# Patient Record
Sex: FEMALE | Race: ASIAN | NOT HISPANIC OR LATINO | Employment: UNEMPLOYED | URBAN - METROPOLITAN AREA
[De-identification: names, ages, dates, MRNs, and addresses within clinical notes are randomized per-mention and may not be internally consistent; named-entity substitution may affect disease eponyms.]

---
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
## 2020-01-27 ENCOUNTER — PRENATAL OFFICE VISIT - HEALTHEAST (OUTPATIENT)
Dept: FAMILY MEDICINE | Facility: CLINIC | Age: 20
End: 2020-01-27

## 2020-01-27 ENCOUNTER — COMMUNICATION - HEALTHEAST (OUTPATIENT)
Dept: FAMILY MEDICINE | Facility: CLINIC | Age: 20
End: 2020-01-27

## 2020-01-27 DIAGNOSIS — Z34.92 SUPERVISION OF NORMAL PREGNANCY IN SECOND TRIMESTER: ICD-10-CM

## 2020-01-27 LAB
ALBUMIN UR-MCNC: NEGATIVE MG/DL
FASTING STATUS PATIENT QL REPORTED: NO
GLUCOSE 1H P 50 G GLC PO SERPL-MCNC: 105 MG/DL (ref 70–139)
GLUCOSE UR STRIP-MCNC: NEGATIVE MG/DL
HGB BLD-MCNC: 7.9 G/DL (ref 12–16)
KETONES UR STRIP-MCNC: NEGATIVE MG/DL

## 2020-01-28 LAB
C TRACH DNA SPEC QL PROBE+SIG AMP: NEGATIVE
N GONORRHOEA DNA SPEC QL NAA+PROBE: NEGATIVE
T PALLIDUM AB SER QL: NEGATIVE

## 2020-01-29 ENCOUNTER — COMMUNICATION - HEALTHEAST (OUTPATIENT)
Dept: FAMILY MEDICINE | Facility: CLINIC | Age: 20
End: 2020-01-29

## 2020-03-31 ENCOUNTER — PRENATAL OFFICE VISIT - HEALTHEAST (OUTPATIENT)
Dept: FAMILY MEDICINE | Facility: CLINIC | Age: 20
End: 2020-03-31

## 2020-03-31 DIAGNOSIS — B18.1 CHRONIC VIRAL HEPATITIS B WITHOUT DELTA AGENT AND WITHOUT COMA (H): ICD-10-CM

## 2020-03-31 DIAGNOSIS — O99.019 ANTEPARTUM ANEMIA: ICD-10-CM

## 2020-03-31 DIAGNOSIS — Z34.93 ENCOUNTER FOR SUPERVISION OF NORMAL PREGNANCY IN THIRD TRIMESTER: ICD-10-CM

## 2020-03-31 LAB
ALBUMIN UR-MCNC: NEGATIVE MG/DL
ALT SERPL W P-5'-P-CCNC: 9 U/L (ref 0–45)
ERYTHROCYTE [DISTWIDTH] IN BLOOD BY AUTOMATED COUNT: 16.2 % (ref 11–14.5)
FERRITIN SERPL-MCNC: <2 NG/ML (ref 10–130)
GLUCOSE UR STRIP-MCNC: NEGATIVE MG/DL
HCT VFR BLD AUTO: 24.4 % (ref 35–47)
HGB BLD-MCNC: 7.6 G/DL (ref 12–16)
KETONES UR STRIP-MCNC: NEGATIVE MG/DL
MCH RBC QN AUTO: 19.3 PG (ref 27–34)
MCHC RBC AUTO-ENTMCNC: 31 G/DL (ref 32–36)
MCV RBC AUTO: 62 FL (ref 80–100)
PLATELET # BLD AUTO: 248 THOU/UL (ref 140–440)
PMV BLD AUTO: 9.3 FL (ref 7–10)
RBC # BLD AUTO: 3.91 MILL/UL (ref 3.8–5.4)
WBC: 6.3 THOU/UL (ref 4–11)

## 2020-04-01 LAB
ALLERGIC TO PENICILLIN: NO
GP B STREP DNA SPEC QL NAA+PROBE: NEGATIVE

## 2020-04-03 ENCOUNTER — INFUSION - HEALTHEAST (OUTPATIENT)
Dept: INFUSION THERAPY | Facility: HOSPITAL | Age: 20
End: 2020-04-03

## 2020-04-03 DIAGNOSIS — O99.019 ANTEPARTUM ANEMIA: ICD-10-CM

## 2020-04-04 LAB
HBV DNA SERPL NAA+PROBE-ACNC: NORMAL [IU]/ML
HBV DNA SERPL NAA+PROBE-LOG IU: NORMAL {LOG_IU}/ML

## 2020-04-07 ENCOUNTER — PRENATAL OFFICE VISIT - HEALTHEAST (OUTPATIENT)
Dept: FAMILY MEDICINE | Facility: CLINIC | Age: 20
End: 2020-04-07

## 2020-04-07 ENCOUNTER — INFUSION - HEALTHEAST (OUTPATIENT)
Dept: INFUSION THERAPY | Facility: HOSPITAL | Age: 20
End: 2020-04-07

## 2020-04-07 DIAGNOSIS — Z34.93 ENCOUNTER FOR SUPERVISION OF NORMAL PREGNANCY IN THIRD TRIMESTER: ICD-10-CM

## 2020-04-07 DIAGNOSIS — O99.019 ANTEPARTUM ANEMIA: ICD-10-CM

## 2020-04-07 LAB
ALBUMIN UR-MCNC: NEGATIVE MG/DL
CRYSTALS AMN MICRO: NORMAL
GLUCOSE UR STRIP-MCNC: NEGATIVE MG/DL
KETONES UR STRIP-MCNC: NEGATIVE MG/DL

## 2020-04-07 ASSESSMENT — MIFFLIN-ST. JEOR: SCORE: 1246.7

## 2020-04-09 ENCOUNTER — INFUSION - HEALTHEAST (OUTPATIENT)
Dept: INFUSION THERAPY | Facility: HOSPITAL | Age: 20
End: 2020-04-09

## 2020-04-09 DIAGNOSIS — O99.019 ANTEPARTUM ANEMIA: ICD-10-CM

## 2020-04-13 ENCOUNTER — INFUSION - HEALTHEAST (OUTPATIENT)
Dept: INFUSION THERAPY | Facility: HOSPITAL | Age: 20
End: 2020-04-13

## 2020-04-13 DIAGNOSIS — O99.019 ANTEPARTUM ANEMIA: ICD-10-CM

## 2020-04-14 ENCOUNTER — PRENATAL OFFICE VISIT - HEALTHEAST (OUTPATIENT)
Dept: FAMILY MEDICINE | Facility: CLINIC | Age: 20
End: 2020-04-14

## 2020-04-14 DIAGNOSIS — Z34.80 SUPERVISION OF OTHER NORMAL PREGNANCY, ANTEPARTUM: ICD-10-CM

## 2020-04-14 DIAGNOSIS — B18.1 CHRONIC VIRAL HEPATITIS B WITHOUT DELTA AGENT AND WITHOUT COMA (H): ICD-10-CM

## 2020-04-14 DIAGNOSIS — Z34.93 THIRD TRIMESTER PREGNANCY: ICD-10-CM

## 2020-04-14 DIAGNOSIS — O99.019 ANTEPARTUM ANEMIA: ICD-10-CM

## 2020-04-14 LAB
ALBUMIN UR-MCNC: NEGATIVE MG/DL
ERYTHROCYTE [DISTWIDTH] IN BLOOD BY AUTOMATED COUNT: 28.2 % (ref 11–14.5)
GLUCOSE UR STRIP-MCNC: NEGATIVE MG/DL
HCT VFR BLD AUTO: 30.8 % (ref 35–47)
HGB BLD-MCNC: 8.8 G/DL (ref 12–16)
KETONES UR STRIP-MCNC: NEGATIVE MG/DL
MCH RBC QN AUTO: 22 PG (ref 27–34)
MCHC RBC AUTO-ENTMCNC: 28.6 G/DL (ref 32–36)
MCV RBC AUTO: 77 FL (ref 80–100)
PLATELET # BLD AUTO: 221 THOU/UL (ref 140–440)
PMV BLD AUTO: 11.3 FL (ref 8.5–12.5)
RBC # BLD AUTO: 4 MILL/UL (ref 3.8–5.4)
WBC: 7.8 THOU/UL (ref 4–11)

## 2020-05-19 ENCOUNTER — OFFICE VISIT - HEALTHEAST (OUTPATIENT)
Dept: FAMILY MEDICINE | Facility: CLINIC | Age: 20
End: 2020-05-19

## 2020-05-19 DIAGNOSIS — Z30.017 NEXPLANON INSERTION: ICD-10-CM

## 2020-05-19 LAB — HCG UR QL: NEGATIVE

## 2020-05-19 ASSESSMENT — EDINBURGH POSTNATAL DEPRESSION SCALE (EPDS): TOTAL SCORE: 0

## 2020-09-01 ENCOUNTER — RECORDS - HEALTHEAST (OUTPATIENT)
Dept: ADMINISTRATIVE | Facility: OTHER | Age: 20
End: 2020-09-01

## 2021-03-26 ENCOUNTER — AMBULATORY - HEALTHEAST (OUTPATIENT)
Dept: PHARMACY | Facility: HOSPITAL | Age: 21
End: 2021-03-26

## 2021-05-26 VITALS
HEART RATE: 98 BPM | OXYGEN SATURATION: 94 % | TEMPERATURE: 98.4 F | SYSTOLIC BLOOD PRESSURE: 87 MMHG | RESPIRATION RATE: 16 BRPM | DIASTOLIC BLOOD PRESSURE: 58 MMHG

## 2021-05-27 VITALS
SYSTOLIC BLOOD PRESSURE: 92 MMHG | DIASTOLIC BLOOD PRESSURE: 54 MMHG | HEART RATE: 98 BPM | TEMPERATURE: 98.4 F | OXYGEN SATURATION: 96 %

## 2021-05-27 VITALS
HEART RATE: 105 BPM | DIASTOLIC BLOOD PRESSURE: 60 MMHG | RESPIRATION RATE: 16 BRPM | SYSTOLIC BLOOD PRESSURE: 85 MMHG | TEMPERATURE: 98.5 F | OXYGEN SATURATION: 98 %

## 2021-05-27 NOTE — PATIENT INSTRUCTIONS - HE
Patient Education   HEALTHY PREGNANCY CARE: 37 to 41 WEEKS PREGNANT    Talk with your midwife or physician about when to call with signs of labor    Regular uterine contractions that are getting closer together and/or stronger    If you think your water has broken or is leaking    Bleeding from the vagina like a period (bloody vaginal discharge is normal)    If you are not feeling your baby move    Make plans for transportation and  as needed for when you are going to the hospital.    Your midwife or physician may offer to check your cervix for changes.     Ask your health care provider about vaccinations you may need following delivery. By now, you should have received a Tdap immunization to protect against pertussis or whooping cough. Fathers and family members who will be in close contact with the baby should also receive a Tdap shot at least two weeks before the expected birth of the baby if they have not had a Td (tetanus) shot for at least two years.    If you are past your due date, discuss the next steps leading to delivery with your midwife or physician. If you don't start labor on your own by 41 or 42 weeks, your midwife or physician may recommend giving you medicines to ripen your cervix and start labor.    Preparing for your baby: Tell your midwife or physician how you plan to feed your baby (breast or bottle), who you have chosen to do pediatric care for your baby, and if you have a boy, whether you have chosen to have him circumcised. You will need a car seat correctly installed in your vehicle to bring your baby home. As you start to set up the nursery at home for your baby, make sure the crib is safe. The mattress needs to fit snugly against the edges of the crib. If you can fit a soda can between the bars, they are too far apart and can allow the baby's head to caught between them.    Learn about infant care and feeding, including information about infant CPR. We recommend that you put  your baby to sleep on his or her back to reduce the chance of Sudden Infant Death Syndrome (SIDS). To maintain a healthy environment in which your child can grow, it's best to keep your home smoke-free. By preparing ahead, your transition into parenthood will go smoothly for you and your baby.    Your midwife or physician will want to see you for a checkup 2 to 6 weeks after delivery.    If you have questions about any symptoms you are experiencing or any other concerns, call your provider or their clinic staff at [facility] FAMILY MEDICINE/OB at Phone: [phone]. If it's after clinic hours, physician patients should call the [facility] Connection at [phone]); midwife patients should call their answering service at [phone].    How can you care for yourself at home?   You can refer to the Starting Out Right book or find it online at [email or web address]/[email or web address]/[email or web address]-Right.pdf or [email or web address]/images/stories/flipbooks/[email or web address]-starting-out-[email or web address]-starting-out-right.html#p=8     You can sign up for a weekly parenting e-mail that gives support, tips and advice from health care professionals that starts with pregnancy and continues through the toddler years. To register, go to [email or web address] at any time during your pregnancy.        Making Plans for Feeding My Baby    By this point, you probably have read a lot about feeding your baby.  Breastfeed or formula? Each mother s decision is her own and [facility] respects you and your choices. We ve gathered information on both breastfeeding and formula feeding to help with your decision. Talking with your physician or nurse-midwife can also help in your decision.  However you plan to feed your baby, [facility] encourage rooming in with your baby, skin-to-skin contact and feeding your baby based on his or her cues.    Skin-to-skin contact  Being close to mom  helps your baby adjust to life outside of the womb.  It helps your baby regulate their body temperature, heart rate, and breathing.  Your baby will usually be placed skin-to-skin immediately following birth or as soon as possible, if medical intervention is needed.    Rooming-In  Having your baby stay with you in your room is called  rooming-in .  Keeping your baby in your room helps you to learn how to care for your baby by getting to know your baby s cues, body rhythms and sleep cycle.       Cue-based feeding  Cues (signals) are baby s way of telling you what he or she wants.  When you learn your infant s cues, you know how to care for and feed your baby.   Feeding cues are the licking and smacking of lips, bringing their fist to their mouth, and a reflex called  rooting - where baby turns and opens his or her mouth, searching for the breast or bottle.  Crying is a late feeding cue.  Babies can feed frequently, often at least 8 times in 24 hours.  Breastfeeding facts  Breast milk is the best source of nutrition for your baby and is available at birth. In the first couple of days, your milk volume is already starting to increase, though it may not be noticeable. Breastfeed frequently to increase your milk supply. Within three to five days, you will begin to notice larger milk volumes. An increase in breast size, heaviness and firmness are often described as the milk  coming in.  Frequent breastfeeding can help breasts from getting overly firm and painful. You will know the baby is getting enough milk if your baby is having wet and dirty diapers and gaining weight.     If your goal is to exclusively breastfeed, it is important to not use any formula or artificial nipples (including bottles and pacifiers) while your baby is learning to breastfeed.  While it may seem like an  easy  option to give your baby a bottle, formula should only be given if there is a medical reason for your baby to have it.    Positioning and  attachment   Get comfortable.  Use pillows as needed to support your arms and baby.  Hold baby close at the level of your breast, facing you in a tummy to tummy position.  Skin to skin helps with this.  Position the baby with his or her nose by the nipple.  There should be a straight line from baby s ear to shoulder to hips.  Tickle your baby s lips or wait for baby to open mouth wide, bring baby to breast by leading with the chin.  Aim the nipple at the roof of baby s mouth.  A rapid sucking pattern is followed by longer, drawing pattern with occasional swallows heard.  When baby is correctly latched, your nipple and much of the areola are pulled well into baby s mouth.      Returning to work or school  Focus on a good start to breastfeeding.  Many women continue to provide breastmilk for their baby when they return to work or school.  Making plans about where to pump and store milk can make the transition go well.  Talk with other mothers who have also returned to work or school for tips and support.  Your employer s Human Resource department may be a resource as well.     Returning to work or school: (continued)     babies can mean fewer  sick  days for you.    A quality breast pump will also save time and add comfort.  Check with your insurance prior to giving birth for breast pump coverage.  Many insurance companies include a pump within your benefits.    Wait until your baby is at least three weeks old to introduce a bottle for the first time.  Have someone besides you give the bottle.    Breastfeed when you are with your baby. Reserve your bottles of breast milk for when you are away.     Your breasts will need to be  emptied  either by your baby or a pump.  Plan to pump at least twice in an eight hour day.    If you cannot pump at work, continue breastfeeding at home. Any amount of breast milk is worth giving to your baby.    Formula feeding facts  If you are planning to use formula to feed your  baby, you will want to make some preparations ahead of time. Talk to your doctor or nurse-midwife about what type of formula to use. Some are iron-fortified, meaning they have extra iron in them. You will want to purchase formula and bottles before your baby is born to be sure you are ready after you return from the hospital. [facility] do not provide formula samples to take home.    Be sure to follow formula mixing directions closely. Regular milk in the dairy case at the grocery store should not be given to babies under 1 year old. Baby formula is sold in several forms including:    Ready-to-use. This is the most expensive, but no mixing is necessary.    Concentrated liquid. This is less expensive than ready-to-use and you mix with water.    Powder. This is the least expensive. You mix one level scoop of powdered formula with two ounces of water and stir well.    Most babies need 2.5 ounces of formula per pound of body weight each day. This means an 8-pound baby may drink about 20 ounces of formula a day; however, this is just an estimate. The most important thing is to pay attention to your baby s cues.  If your baby is always fussy, needs more iron or has certain food allergies, your physician may suggest you change your baby s formula to a different kind.   How do I warm my baby s bottles?  You may feed your baby a bottle without warming it first. It is OK for the breast milk or formula to be cool or room temperature. If your baby seems to prefer it warmed, you can put the filled bottle in a container of warm water and let it stand for a few minutes. Check the temperature of the liquid on your skin before feeding it to your baby; to be sure it isn t too hot. Do not heat bottles in the microwave. Microwaves heat food and liquids unevenly, and this can cause hot spots that can burn your baby.    How do I clean and sterilize bottles?  Sterilize bottles and nipples before you use them for the first  time. You can do this by putting them in boiling water for 5 minutes. After that first time, you can wash them in hot and soapy water. Rinse them carefully to be sure there is no soap left on them. You can also wash them in the .    [facility] [phone] ([phone])

## 2021-05-27 NOTE — PROGRESS NOTES
Discussed post dates management.  When to come to the hospital.  No ctx, lof, bleeding, or dc.  No HA or vision changes.

## 2021-05-30 VITALS — BODY MASS INDEX: 20.84 KG/M2 | WEIGHT: 113 LBS

## 2021-05-31 VITALS — BODY MASS INDEX: 24.15 KG/M2 | WEIGHT: 131 LBS

## 2021-05-31 VITALS — WEIGHT: 129 LBS | BODY MASS INDEX: 23.79 KG/M2

## 2021-05-31 VITALS — BODY MASS INDEX: 23.6 KG/M2 | WEIGHT: 128 LBS

## 2021-05-31 VITALS — BODY MASS INDEX: 23.79 KG/M2 | WEIGHT: 129 LBS

## 2021-06-01 VITALS — WEIGHT: 102 LBS | BODY MASS INDEX: 19.26 KG/M2 | HEIGHT: 61 IN

## 2021-06-01 NOTE — TELEPHONE ENCOUNTER
New Appointment Needed  What is the reason for the visit:    Pregnancy Confirmation Appt Needed  When was the first day of your last menstrual cycle?: 07-14 or 07-15-19  Have you done a home pregnancy test?: Yes: 2 weeks ago.    Provider Preference: PCP only  How soon do you need to be seen?: tomorrow  Waitlist offered?: No  Okay to leave a detailed message:  Yes

## 2021-06-01 NOTE — PATIENT INSTRUCTIONS - HE
Pregnancy: about 8 weeks    Due Date: April 20, 2020    Next visit in 4 weeks  With Dr. Linares  For Your First OB Visit

## 2021-06-01 NOTE — PROGRESS NOTES
Chief Complaint:  Chief Complaint   Patient presents with     pregnancy confirmation     HPI:   Ifeanyi Irizarry is a 19 y.o. female is here for pregnancy intake.  She is .  Patient's last menstrual period was 07/15/2019 (approximate).  Last delivery 19.  Had periods in May and .  Positive home pregnancy test 1 month ago.had some nausea and vomiting, getting better.    Past medical history: reviewed and updated.  Past Medical History:   Diagnosis Date     Anemia affecting pregnancy 2017     Chlamydia infection 2016     History of fetal demise 2016    missed AB 18 weeks gestation      (normal spontaneous vaginal delivery)      Postpartum hemorrhage 2019     Short cervix during pregnancy in third trimester 2017     Short femur of fetus on prenatal ultrasound 8/3/2017     History reviewed. No pertinent surgical history.    Current Outpatient Medications:      ascorbic acid, vitamin C, (ASCORBIC ACID WITH LILI HIPS) 500 MG tablet, Take 1 tablet (500 mg total) by mouth daily., Disp: 90 tablet, Rfl: 0     docusate sodium (COLACE) 100 MG capsule, Take 1 capsule (100 mg total) by mouth daily., Disp: 45 capsule, Rfl: 0     prenatal vit no.112-folate no6 1 mg Chew, Chew 1 tablet daily., Disp: 100 tablet, Rfl: 3    Social:  Social History     Tobacco Use     Smoking status: Never Smoker     Smokeless tobacco: Never Used     Tobacco comment: no second hand smoke   Substance Use Topics     Alcohol use: No     Drug use: No       OBJECTIVE:  No Known Allergies  Vitals:    19 1427   BP: 100/60   Pulse: 100   Resp: 16     Body mass index is 20.07 kg/m .    Vital signs stable as recorded above  General: Patient is alert and oriented x 3, in no apparent distress  Remainder of physical exam deferred to patient's First OB Visit.    Results:  Results for orders placed or performed in visit on 19   Culture, Urine   Result Value Ref Range    Culture No Growth    Pregnancy (Beta-hCG, Qual), Urine    Result Value Ref Range    Pregnancy Test, Urine Positive (!) Negative   Drugs of Abuse 1,Urine   Result Value Ref Range    Amphetamines Screen Negative Screen Negative    Benzodiazepines Screen Negative Screen Negative    Opiates Screen Negative Screen Negative    Phencyclidine Screen Negative Screen Negative    THC Screen Negative Screen Negative    Barbiturates Screen Negative Screen Negative    Cocaine Metabolite Screen Negative Screen Negative    Oxycodone Screen Negative Screen Negative    Creatinine, Urine 64.7 mg/dL   Urinalysis, OB Screen   Result Value Ref Range    Glucose, UA Negative Negative    Ketones, UA Negative Negative    Protein, UA Negative Negative mg/dL     Other screening pregnancy lab work is ordered and pending.    Patient scored a 4/30 on [place]  Depression Screen.    Assessment and Plan:  1. Pregnancy Intake Appointment.  Ifeanyi Irizarry is 19 y.o. and .  Patient's last menstrual period was 07/15/2019 (approximate).  Estimated Date of Delivery: 20  She will be seeing Dr. Linares for OB care.  Screening pregnancy lab work was drawn.  Prenatal vitamins prescribed.  Problem list and current medications reviewed and updated.  Dating ultrasound ordered.  Prenatal info packet given.  First trimester genetic screening test was discussed with patient.    She declines Nuchal translucency ultrasound.        2. Last delivery 19.    3. Hepatitis B.    Follow up in 4 weeks.  Please see OB Episode for complete details.  Visit was approximately 40 minutes, greater than 50% of time spent in face-to-face counseling and coordination of care.    This dictation uses voice recognition software, which may contain typographical errors.

## 2021-06-01 NOTE — TELEPHONE ENCOUNTER
Called pt and informed her that she will need to see Kimber first for a pregnancy confirmation. Per pt agreed and an appt was made.

## 2021-06-02 VITALS — WEIGHT: 122 LBS | BODY MASS INDEX: 22.86 KG/M2

## 2021-06-02 VITALS — WEIGHT: 126 LBS | BODY MASS INDEX: 23.61 KG/M2

## 2021-06-02 VITALS — WEIGHT: 120 LBS | BODY MASS INDEX: 22.49 KG/M2

## 2021-06-02 VITALS — BODY MASS INDEX: 20.61 KG/M2 | WEIGHT: 110 LBS

## 2021-06-02 VITALS — WEIGHT: 104 LBS | BODY MASS INDEX: 19.49 KG/M2

## 2021-06-02 VITALS — BODY MASS INDEX: 21.55 KG/M2 | WEIGHT: 115 LBS

## 2021-06-02 NOTE — TELEPHONE ENCOUNTER
Patient has a future appointment on Nov. 7, 2019 @ 3:30 with Dr. Linares for First OB. Completing task.

## 2021-06-02 NOTE — TELEPHONE ENCOUNTER
Who is calling:  Patient   Reason for Call:  Patient needs to schedule her OB appointment, but is not sure when she should come in.  States it should be with Dr. Linares, but his first opening is October 31st.    Clinic please contact patient to schedule appropriately  Date of last appointment with primary care: 9.9.2019  Okay to leave a detailed message: Yes

## 2021-06-03 VITALS
HEART RATE: 100 BPM | RESPIRATION RATE: 16 BRPM | WEIGHT: 106.2 LBS | HEIGHT: 61 IN | BODY MASS INDEX: 20.05 KG/M2 | SYSTOLIC BLOOD PRESSURE: 100 MMHG | DIASTOLIC BLOOD PRESSURE: 60 MMHG

## 2021-06-03 VITALS
WEIGHT: 105 LBS | SYSTOLIC BLOOD PRESSURE: 88 MMHG | HEART RATE: 84 BPM | DIASTOLIC BLOOD PRESSURE: 40 MMHG | BODY MASS INDEX: 19.84 KG/M2

## 2021-06-03 NOTE — TELEPHONE ENCOUNTER
Called and left detailed message for Elba regarding the information that she requested. Instructed her to return phone call if further information is needed.   Pia Kaplan

## 2021-06-03 NOTE — TELEPHONE ENCOUNTER
----- Message from Catrachito Linares MD sent at 11/10/2019  5:15 PM CST -----  Call:  Blood level is low.  It is important to take the iron supplement and eat foods that have iron.

## 2021-06-03 NOTE — TELEPHONE ENCOUNTER
----- Message from Catrachito Linares MD sent at 11/23/2019 10:47 AM CST -----  Call:  Ultrasound looks good.

## 2021-06-03 NOTE — TELEPHONE ENCOUNTER
Who is calling:  Elba Tamayo CoKacie [facility]   Health Hepatitis B Unit  Reason for Call:  Caller stated she is calling to request the following information regarding the patient.    1. Due Date  2. Name of hospital chosen for delivery  Date of last appointment with primary care:N/a  Okay to leave a detailed message: Yes  [phone]

## 2021-06-03 NOTE — PATIENT INSTRUCTIONS - HE
"  Patient Education   HEALTHY PREGNANCY CARE: 14 to 18 WEEKS PREGNANT    During this time, you may start to \"show,\" so that you look pregnant to people around you. You may also notice some changes in your skin, such as an increase in acne on your face. You may notice your heart pounding, a sharp stretching ache on either side of your lower abdomen (round ligament), and more vaginal discharge.     Your baby's nerves and muscles are maturing. Sex organs are recognizable. Your baby is now able to pass urine, and your baby's first stool (meconium) is starting to collect in his or her intestines. Hair is also beginning to grow on your baby's head. Your baby is moving freely inside your uterus but you may not be able to feel it until 18-20 weeks.    Continue making healthy choices for your baby during pregnancy, including good nutrition, exercise and a safe environment free from smoking, alcohol and drugs.    Your genetic screening with a quad screen blood test may have been done today.    Watch for warning signs and contact your midwife or physician at the clinic with any concerns at [facility] FAMILY MEDICINE/OB at Phone: [phone]. For example, call about cramping, bleeding, abdominal pain, watery vaginal discharge, or if you are unable to keep fluids down for more than 24 hours due to vomiting.   If it's after clinic hours, physician patients should call the Care Connection at [phone]); midwife patients should call their answering service at [phone].    How can you care for yourself at home?   You can refer to the Starting Out Right book or find it online at [email or web address]/stories/[email or web address]-[email or web address]-Right.pdf or http://www.[email or web address]/images/stories/flipbooks/[facility]-starting-out-right/[facility]-starting-out-right.html#p=8     You can sign up for a weekly parenting e-mail that gives support, tips and advice from health care professionals that starts " with pregnancy and continues through the toddler years. To register, go to [email or web address] at any time during your pregnancy.

## 2021-06-03 NOTE — PROGRESS NOTES
18w  Dates reviewed and ultrasound 10 days differing from LMP at 9 weeks, so will change due date to ultrasound.    Feeling ok.  Close pregnancies.  Wasn't intending on this soon, but will continue pregnancy.  Iron likely still low, so will recommend she start on FeSO4.    Discussed screening for aneuploidy and neural tube defects. She declined screening.    Carrier screening for SMA, CF, thalassemia, fragile X discussed.  She declined screening.    Reviewed intake exam, labs, DELROY, past medical history, past surgical history, family history, genetic history, and previous obstetrical history.     Just had testing for Hep B with low viral load so doesn't need recheck now.    1. Supervision of normal pregnancy in second trimester  - US OB > = 14 Weeks; Future  - ABO/RH Typing (OP order)  - Hepatitis B Surface antigen (HBsAG)  - HIV Antigen/Antibody Screening [facility]  - HM1(CBC and Differential)  - HML Antibody Screen  - RPR  - Rubella Immune Status (IgG)  - Urinalysis Macroscopic  - Culture, Urine  - Lead, Blood  - HM1 (CBC with Diff)  - ferrous sulfate 325 (65 FE) MG tablet; Take 1 tablet (325 mg total) by mouth daily.  Dispense: 60 tablet; Refill: 1  - Lead, Blood, Venouos    2. Anemia, unspecified type  - ferrous sulfate 325 (65 FE) MG tablet; Take 1 tablet (325 mg total) by mouth daily.  Dispense: 60 tablet; Refill: 1

## 2021-06-03 NOTE — TELEPHONE ENCOUNTER
"Called and left message for pt to return call.# 3  \" Okay to relay message\"  Okay to send letter?  "

## 2021-06-04 VITALS
RESPIRATION RATE: 18 BRPM | TEMPERATURE: 98.5 F | TEMPERATURE: 98.8 F | HEART RATE: 107 BPM | DIASTOLIC BLOOD PRESSURE: 58 MMHG | OXYGEN SATURATION: 97 % | HEART RATE: 92 BPM | BODY MASS INDEX: 22.66 KG/M2 | HEIGHT: 61 IN | SYSTOLIC BLOOD PRESSURE: 92 MMHG | WEIGHT: 120 LBS | WEIGHT: 122 LBS | DIASTOLIC BLOOD PRESSURE: 50 MMHG | BODY MASS INDEX: 23.05 KG/M2 | SYSTOLIC BLOOD PRESSURE: 90 MMHG | RESPIRATION RATE: 16 BRPM

## 2021-06-04 VITALS
SYSTOLIC BLOOD PRESSURE: 100 MMHG | RESPIRATION RATE: 14 BRPM | DIASTOLIC BLOOD PRESSURE: 65 MMHG | TEMPERATURE: 98.5 F | WEIGHT: 123 LBS | BODY MASS INDEX: 23.24 KG/M2 | HEART RATE: 93 BPM

## 2021-06-04 VITALS
HEART RATE: 67 BPM | WEIGHT: 109 LBS | SYSTOLIC BLOOD PRESSURE: 93 MMHG | BODY MASS INDEX: 20.6 KG/M2 | TEMPERATURE: 97.8 F | DIASTOLIC BLOOD PRESSURE: 55 MMHG | RESPIRATION RATE: 16 BRPM

## 2021-06-04 VITALS
BODY MASS INDEX: 20.78 KG/M2 | SYSTOLIC BLOOD PRESSURE: 90 MMHG | HEART RATE: 88 BPM | DIASTOLIC BLOOD PRESSURE: 50 MMHG | WEIGHT: 110 LBS

## 2021-06-04 VITALS
TEMPERATURE: 99 F | HEART RATE: 92 BPM | RESPIRATION RATE: 16 BRPM | SYSTOLIC BLOOD PRESSURE: 98 MMHG | WEIGHT: 119 LBS | BODY MASS INDEX: 22.48 KG/M2 | DIASTOLIC BLOOD PRESSURE: 56 MMHG

## 2021-06-04 NOTE — TELEPHONE ENCOUNTER
----- Message from Catrachito Linares MD sent at 12/28/2019  1:52 PM CST -----  Due for ob follow up

## 2021-06-04 NOTE — TELEPHONE ENCOUNTER
Left message #1 at [phone] for patient to call clinic to schedule OB appt with Dr. Linares. Postponing task out to a week and will try again.

## 2021-06-05 NOTE — PROGRESS NOTES
29w3d  Daughter had appt today, so we adde mom on as she has not been here for care, recently.  No ctx, lof, bleeding, or dc.  No HA or vision changes.     BP 90/50 (Patient Site: Left Arm, Patient Position: Sitting, Cuff Size: Adult Small)   Pulse 88   Wt 110 lb (49.9 kg)   LMP 07/15/2019 (Approximate)   BMI 20.78 kg/m       Recent Results (from the past 24 hour(s))   Urinalysis, OB Screen   Result Value Ref Range    Glucose, UA Negative Negative    Ketones, UA Negative Negative    Protein, UA Negative Negative mg/dL   Glucose,Gestational Challenge (1 Hour)   Result Value Ref Range    Glucose, Gestational Challenge 1hr 105 70 - 139 mg/dL    Patient Fasting > 8hrs? No    Hemoglobin   Result Value Ref Range    Hemoglobin 7.9 (LL) 12.0 - 16.0 g/dL      1. Supervision of normal pregnancy in second trimester  Discussed routine follow up for pregnancy care.  She is doing well, in spite of  - Urinalysis, OB Screen  - Glucose,Gestational Challenge (1 Hour)  - Syphilis Screen, [facility]  - Hemoglobin  - Tdap vaccine,  8yo or older,  IM  - Chlamydia trachomatis & Neisseria gonorrhoeae, Amplified Detection     2. Anemia  Was supposed to be on iron supplement.  Hgb low despite--if not taking, needs to start.  If taking, would consider this refractory and need to consider IV iron repletion.

## 2021-06-05 NOTE — TELEPHONE ENCOUNTER
----- Message from Catrachito Linares MD sent at 1/29/2020  4:59 PM CST -----  Call:  hgb is lower.  Have you been taking iron (ferrous sulfate) and vit c?  If not we would consider IV treatment to get your iron levels up.

## 2021-06-05 NOTE — TELEPHONE ENCOUNTER
"Called and left message for pt to return call.#3. \" Okay to relay message\"    Third attempt today. Okay to send a letter?    "

## 2021-06-05 NOTE — TELEPHONE ENCOUNTER
Informed pt of message. She states she has not been taking the iron or vitamin C. She declined IV treatment as well.

## 2021-06-05 NOTE — TELEPHONE ENCOUNTER
Blood level is low.  We worry about starting low and then having bleeding during delivery  I think it is important to take the iron (and Vit C helps more of this absorb into the body)

## 2021-06-05 NOTE — TELEPHONE ENCOUNTER
----- Message from Catrachito Linares MD sent at 1/27/2020  5:06 PM CST -----  Call:  Good news, normal glucose.

## 2021-06-07 NOTE — PROGRESS NOTES
"Patient arrived ambulatory and was seated in chair 2. Reviewed plan of care and today's treatment. \"Yes, I have a lot of activity from the baby - I see the doctor tomorrow.\" Placed IV and used NS as the primary IV solution. Administered the 3rd dose of iron sucrose 200 mg over 5 minutes IVP, and flushed the IV line - using  mls. Patient was monitored, refer to the vital signs tab for those results. Up to the bathroom x 1. Drank 120 mls of apple juice. Left the unit ambulatory in stable condition at 14:26 - patient plans to return on Wednesday.    Taisha Martin RN  "

## 2021-06-07 NOTE — PROGRESS NOTES
Started having ctxs yesterday every 5 minutes but gone this morning.  Regular fetal movement.  No bleeding.  Maybe LOF yesterday- had a gush of fluid x2, clear.  Had to change pad and underware and no LOF since then.  No PROM with other kids.  Did get iron infusion this week and again today.      Fern negative today- lots of thick mucous noted.      F/u with Dr Matias next week

## 2021-06-07 NOTE — PROGRESS NOTES
Reviewed low iron and recommendations for iron infusions x5 sessions before baby born due to low blood supply from covid-19.  Has been eating iron daily and PNV.  Not feeling tired.  At home with kids-  not working right now.  Regular fetal movement.  Mild ctxs a few days ago but went away.  nobleeding or LOF.  Has needed supplies for baby.  No concerns and otherwise feeling healthy.     Set up first iron infusion today before leaving.  GBS today.

## 2021-06-07 NOTE — PROGRESS NOTES
Discussed change in management and care by providers due to covid 19.   Discussed change in hospital policies and procedures due to covid 19.   Feeling well but tired of pregnancy.   No contractions, lof, bleeding. +FM.   She would like to be induced today.   Called [facility]. No space today but may come tomorrow. Scheduled for induction of labor tomorrow though bishops is 2.   Patient notified she may have to wait if availability at hospital is not better. She will call in the am at 6:30.   Of note recently treated for worsening anemia in light of covid 19.   Five infusions ordered, completed four. Patient says the RN canceled her fifth due to low blood pressure?  Hemoglobin checked today, is improved. Should be on oral iron as well.

## 2021-06-07 NOTE — PROGRESS NOTES
Ifeanyi Irizarry arrived for dose two Iron Sucrose IV push. Ifeanyi Irizarry was educated on her plan of care. Each medication given today was reviewed prior to administration. Iron Sucrose 200 mg was given IV push with NS infusing. Treatment and 30 minute post infusion observation period were completed with no signs of reaction. IV site:peripheral IV patent throughout treatment with positive blood return obtained before and at completion. IV site with no pain, redness, swelling and drainage. IV site flushed with saline and discontinued. Ifeanyi Irizarry has follow-up appointment scheduled on 04/09/2020. Ifeanyi Irizarry was discharged to home. She discharged from unit ambulatory and independent at 1622 . The after visit summary was declined.     Laura Gray

## 2021-06-07 NOTE — PROGRESS NOTES
Pt came into infusion clinic for her IV iron as ordered. Medications explained to pt who verbalized understanding. IV patent throughout infusion. Pt tolerated infusion with no complications. Pt monitored for 30 min with no s/s of a reaction. Pt left infusion clinic via ambulatory and will RTC as sched.

## 2021-06-07 NOTE — PATIENT INSTRUCTIONS - HE
Patient Education   HEALTHY PREGNANCY CARE: 37 to 41 WEEKS PREGNANT    Talk with your midwife or physician about when to call with signs of labor    Regular uterine contractions that are getting closer together and/or stronger    If you think your water has broken or is leaking    Bleeding from the vagina like a period (bloody vaginal discharge is normal)    If you are not feeling your baby move    Make plans for transportation and  as needed for when you are going to the hospital.    Your midwife or physician may offer to check your cervix for changes.     Ask your health care provider about vaccinations you may need following delivery. By now, you should have received a Tdap immunization to protect against pertussis or whooping cough. Fathers and family members who will be in close contact with the baby should also receive a Tdap shot at least two weeks before the expected birth of the baby if they have not had a Td (tetanus) shot for at least two years.    If you are past your due date, discuss the next steps leading to delivery with your midwife or physician. If you don't start labor on your own by 41 or 42 weeks, your midwife or physician may recommend giving you medicines to ripen your cervix and start labor.    Preparing for your baby: Tell your midwife or physician how you plan to feed your baby (breast or bottle), who you have chosen to do pediatric care for your baby, and if you have a boy, whether you have chosen to have him circumcised. You will need a car seat correctly installed in your vehicle to bring your baby home. As you start to set up the nursery at home for your baby, make sure the crib is safe. The mattress needs to fit snugly against the edges of the crib. If you can fit a soda can between the bars, they are too far apart and can allow the baby's head to caught between them.    Learn about infant care and feeding, including information about infant CPR. We recommend that you put  your baby to sleep on his or her back to reduce the chance of Sudden Infant Death Syndrome (SIDS). To maintain a healthy environment in which your child can grow, it's best to keep your home smoke-free. By preparing ahead, your transition into parenthood will go smoothly for you and your baby.    Your midwife or physician will want to see you for a checkup 2 to 6 weeks after delivery.    If you have questions about any symptoms you are experiencing or any other concerns, call your provider or their clinic staff at [facility]OB at Phone: [phone]. If it's after clinic hours, physician patients should call the [facility] Connection at [phone] ([phone]); midwife patients should call their answering service at [phone].    How can you care for yourself at home?   You can refer to the Starting Out Right book or find it online at [email or web address]-[email or web address] or [email or web address]/images/stories/[email or web address]-out-[email or web address]-starting-out-[email or web address]#p=[email or web address]     You can sign up for a weekly parenting e-mail that gives support, tips and advice from health care professionals that starts with pregnancy and continues through the toddler years. To register, go to [email or web address] at any time during your pregnancy.    Making Early Breastfeeding or Chestfeeding Work: What's Important?  Breastfeeding/chestfeeding is important!     It helps keep babies healthy.    Parents who breastfeed/chestfeed have lower risks of breast and ovarian cancer.    It's convenient: the milk is always ready and warm, and there is nothing to mix or prepare for feeding.    Formula is harder for your baby to digest.    It helps you bond with your baby and protects against postpartum depression.  Lots of early skin-to-skin contact with your baby    Place your naked baby with baby's belly against your bare chest. Cover baby's back with a  "blanket    Start skin-to-skin right after birth, as soon as you are ready    Skin-to-skin:  ? Helps keep baby warm  ? Improves baby's oxygen and blood sugar levels  ? Helps your uterus contract and bleed less  ? Helps baby feel calm and comforted  ? Helps you feel close to baby  ? Helps get breastfeeding started. Being close makes latching on easier, and baby may move over to the nipple and latch without help  ? Baby breastfeeds better and longer when skin-to-skin  Placing baby well for good attachment to the breast or chest    Hold your baby close with baby's tummy touching your tummy.    Wait for baby to open mouth wide, then bring baby onto the breast/chest.    Baby should take a big mouthful of breast/chest, not just the nipple. This helps baby get more milk, and the suckling should feel comfortable.    When baby is latched well to the breast/chest, nipples aren't cracked or painful.  Keeping baby near (called \"rooming-in\" at the hospital)    Baby sleeps better and cries less when birth parent is near. Your room is quiet.    We will place your baby safely on their back in their bassinette. This lets you practice safe sleep for your baby while keeping them at your bedside.    Baby feeds more often, which means your milk supply increases faster, and your baby loses less weight.    Parents have an easier time getting to know and bonding with baby.    Parents feel much more confident about baby care and breastfeeding/chestfeeding.    Maternity staff can help at any time.  Feeding on cue    Feeding on cue simply means feeding whenever your baby shows signs of hunger    Crying is a late hunger sign. Feed baby whenever baby wants for as long as baby wants.    Feeding signs: mouth movements, sticking the tongue out, rooting (baby turns toward chest and may open mouth), hand-to-mouth movements    Advantages of feeding on cue:  ? Frequent breastfeeding/chestfeeding in the first weeks after birth gives you a good milk " "supply for months to come.  ? Babies settle into a relaxing feed more quickly. Babies enjoy feedings more when they don't have to cry to be fed.  ? Feeding is comfort as well as nutrition. Newborns love constant closeness and feeding and can't be held \"too much\" or \"spoiled.\"  ? Newborns need small frequent feedings in the first days of life. Just one to three teaspoons fill a new baby's stomach.  ? Responding to feeding cues helps babies gain weight.  Feeding only human milk in the first six months    Colostrum is the first milk that baby gets at birth. The amount of colostrum matches the baby's stomach, so it will not be overfilled.    The small volumes ready at birth are also easier for baby to handle.    All babies lose weight in the first few days. This is simply \"water weight.\"    Introducing food or fluids other than human milk too early can cause problems for breastfeeding/chestfeeding and for your baby's health.    Feeding only human milk maximizes the protection against disease and infections.    Your body knows how much milk to make by how often your baby feeds. If you give your baby formula, your body may not know how much milk to make.    For informational purposes only. Not to replace the advice of your health care provider. Adapted with permission from \"Getting Started with Infant Care and Breastfeeding,\" by MARGARET Elizabeth, TELMA, Edel Krishnamurthy, RN, IBCLC, Karine Macedo MD, TELMA, and Blanca Walker MD, IBCLC. [place] [employer], 2017. Clinically reviewed by [facility]. [employer] 303701 - 05/19.        [facility] Breastfeeding Resources     Research shows that human milk offers the best  nutrition and protection for babies. So at [facility],  we care for families and babies in a way that  promotes, teaches and supports lactation. We  support all breastfeeding, chestfeeding and human  milk feeding families, as well as families who can t  breastfeed / chestfeed or who " choose not to do so.  A mother or caregiver s own milk is best for a baby,  but if you are unable to breastfeed / chestfeed, we  may suggest pasteurized donor human milk for your  baby while in the hospital.    Lactation support    The following [facility] locations offer  individual outpatient lactation consults. Some  locations offer phone or group lactation consults  with certified lactation consultants. Call to confirm  services and for information and appointments. You  may wish to call your insurance company first to see  if they will cover the cost of a consult.    [facility]: [phone]    [facility]: [phone]    [facility]: [phone]  Offers [facility] First Days program, which includes  group lactation visits and one free information session  about delivering your baby at a designated [facility] hospital and the importance and benefits  of breastfeeding and human milk. These group visits  also help patients with feeding concerns and offer  information about other postpartum topics.                For informational purposes only. Not to replace the advice of your health care provider. Copyright   2005 [facility]. All rights reserved. [employer] 922225 - REV          [facility] ([place]):  [phone]    [facility] ([place]):  [phone] or [phone]    [facility]):  [phone]    [facility]  ([place]): [phone]    [facility] ([place]):  [phone] or [phone]  Includes follow-up visits for caregivers of babies  discharged from the  intensive care unit  (NICU) at [facility].    [facility]):  [phone]    [facility] ([facility],  [facility], primary care clinics):  [phone]  Also offers  weight checks, feeding discussions and support with a lactation consultant as a part of free, weekly support group.    [facility]: [phone]  Also offers a free weekly support group.                                                                                                                                                                                                      (continued)   You may also call [facility] at [phone]  for information about [place] and [facility]  locations that offer lactation support. For information  about breastfeeding / chestfeeding and childbirth  classes in the [facility], go to [facility] at [email or web address]. For [facility], go to [email or web address] and click on  Classes and Events at the bottom of the page. Or, call  [phone].    Supplies    You can get breast pumps from the Birthplace nurses  at [facility] or [facility]  nurses at [facility]. Call your health  insurance to see if they will cover the cost of the  pump. Tell your nurse you d like a pump. They will  help you fill out the right paperwork. The pump will  be ready for you when you leave the hospital.    If you decide to get a pump after you leave the  hospital, you can get one from our partners at  [facility]. [facility] carries a range of  feeding supplies and pumps. Please call your health  insurance to see if they will cover the cost of the  pump. Then call [facility]  to find out what supplies and pumps are available.  Some stores may deliver the pump to your home.    [facility]:  [email or web address] Other lactation services    [employer]: [phone] (24 hours a day)  www.[email or web address].org  Offers support for breastfeeding / chestfeeding and  human milk feeding families. Call to find a group in  your area.    National Women s Health  Information Center:  [phone]  www.womenshealth.gov/breastfeeding  Offers a breastfeeding / chestfeeding information  line in English and Polish.    WIC (Women, Infants and Children) Program:  [phone]  Offers breastfeeding / chestfeeding counseling. Call  to find an office near you.    Milk banking    [facility]  [email or web address]  [phone]  Consider freezing your extra collected milk to donate  to babies in need. Email or call for information.                       If you are deaf or hard of hearing, please let us know. We provide many free services including  sign language interpreters, oral interpreters, TTYs, telephone amplifiers, note takers and written materials.

## 2021-06-07 NOTE — PROGRESS NOTES
"Patient  arrived at 13:04, seated in chair 4. Reviewed plan of care. Placed IV and administered the 3rd dose of iron sucrose 200 mg IVP over 5 minutes. Patient was monitored. \"I feel fine, no dizziness, no lightheadedness.\" Left the unit ambulatory at 14:15 in stable condition and plans to return next Monday. \"The baby is a girl, she is due tomorrow - my other two babies were 2 and 3 days late.\"     Taisha Martin RN  "

## 2021-06-08 NOTE — PROGRESS NOTES
Post Partum Check:    Delivery at 40w5d now .  Date of delivery: 4/15/20    Patient concerns: none    Bleeding: no concerns    Pain: no concerns    LMP:Patient's last menstrual period was 07/15/2019 (approximate).     Depression: no concerns  Postpartum Depression Screen EPDS Total Score: 0 (2020  4:00 PM)  .  Item 10 (suicidal thoughts): Negative.  Interpretation Guide: Possible Depression = 10 or greater.       Contraception Plan:  Nexplanon today    Immunizations needed:  none    Pap smear:  Too young    Physical Exam:  Blood pressure 93/55, pulse 67, temperature 97.8  F (36.6  C), temperature source Tympanic, resp. rate 16, weight 109 lb (49.4 kg), last menstrual period 07/15/2019, unknown if currently breastfeeding. Body mass index is 20.6 kg/m .  Heart: Regular rate and rhythm, no murmurs, rubs, or gallops.  Lungs: Clear to auscultation bilaterally.  No crackles.  Abdomen: Soft, nontender, bowel sounds normal.  No significant uterine tenderness.  Genitourinary: declined.  Extremities: Nontender, no significant edema.        Assessment and Plan     20 y.o.  here today for postpartum check.  1. Discussed ideal body weight.   2. Return to work: NA.  3. Pap smear too young.  4. Contraception Plan: Nexplanon. Please see other note  5. Postpartum Evaluation of Pregnancy/Chronic Health Conditions: none.  6. Referrals: none.  7. Next physical exam due in one year.

## 2021-06-08 NOTE — PROGRESS NOTES
CC:  Here for Nexplanon insertion today   HPI:  Pt is a  20 y.o.  alert female who presents for Nexplanon insertion today. Reviewed with patient all contraceptive options including:  OCP, Ortho Evra, Nuvaring, Depo Provera, IUD-both Mirena and ParaGard, nexplanon, condoms and diaphragm.  After hearing all of her options, the patient chose Nexplanon.  We discussed risks and benefits of nexplanon  including:   the possible side effect of metrorrhagia with irregular spotting or bleeding within the first 3-6 months after insertion as well as the 20% risk of amenorrhea.  Other minor side effects were discussed including:  headache (16 percent), weight gain (12 percent), acne (12 percent), breast tenderness (10 percent), emotional lability (6 percent) and abdominal pain (5 percent), pain at insertion site.  Informed consent was obtained.  Pregnancy test was negative.  Had sex a week ago but is about 4-5 weeks postpartum.       Patient's last menstrual period was 07/15/2019 (approximate).  No Known Allergies  Scheduled Meds:    lidocaine 1%-EPINEPHrine 1:100,000  2.5 mL Intradermal Once     Past Medical History:   Diagnosis Date     Anemia affecting pregnancy 2017     Chlamydia infection 2016     History of fetal demise 2016    missed AB 18 weeks gestation      (normal spontaneous vaginal delivery)      Postpartum hemorrhage 2019     Short cervix during pregnancy in third trimester 2017     Short femur of fetus on prenatal ultrasound 8/3/2017          No past surgical history on file.  Social History     Socioeconomic History     Marital status: Single     Spouse name: Not on file     Number of children: Not on file     Years of education: Not on file     Highest education level: Not on file   Occupational History     Not on file   Social Needs     Financial resource strain: Not on file     Food insecurity     Worry: Not on file     Inability: Not on file     Transportation needs      Medical: Not on file     Non-medical: Not on file   Tobacco Use     Smoking status: Never Smoker     Smokeless tobacco: Never Used     Tobacco comment: no second hand smoke   Substance and Sexual Activity     Alcohol use: No     Drug use: No     Sexual activity: Yes     Partners: Male     Birth control/protection: None   Lifestyle     Physical activity     Days per week: Not on file     Minutes per session: Not on file     Stress: Not on file   Relationships     Social connections     Talks on phone: Not on file     Gets together: Not on file     Attends Bahai service: Not on file     Active member of club or organization: Not on file     Attends meetings of clubs or organizations: Not on file     Relationship status: Not on file     Intimate partner violence     Fear of current or ex partner: Not on file     Emotionally abused: Not on file     Physically abused: Not on file     Forced sexual activity: Not on file   Other Topics Concern     Not on file   Social History Narrative     Not on file                        ROS:    Constitutional:  Denies Headache.  No weight changes.  No fevers or chills.    HEENT:  Denies vision changes or hearing changes. No sinus problems.  Respiratory:  No breathing issues, cough or shortness of breath  Cardiovascular:  Denies chest pain, syncope or palpitations.    GI:  Denies nausea, vomiting, diarrhea, or constipation  Musculoskeletal:  Denies joint pain, swelling, or erythema  Skin:  Denies rashes, significant lesions or pruritis.    PHYSICAL EXAM  BP 93/55 (Patient Site: Right Arm, Patient Position: Sitting, Cuff Size: Adult Regular)   Pulse 67   Temp 97.8  F (36.6  C) (Tympanic)   Resp 16   Wt 109 lb (49.4 kg)   LMP 07/15/2019 (Approximate)   BMI 20.60 kg/m    GENERAL APPEARANCE:  The patient is a pleasant, normal appearing  female with normal affect and in no distress.    PROCEDURE:   A point approximately 8 cm from the medial epicondyle on the upper inner left arm  was identified and cleaned with alcohol. This was injected with 2.5cc of 1% Lidocaine with epi along the planned insertion canal. The site was then prepped with Betadyne. The Nexplanon insertion needle was removed from the sterile pack.  The Nexplanon was then inserted just underneath the surface of the skin in the recommended fashion.  Following removal of the insertion needle, the Nexplanon implant was palpated by both the patient and myself.   The insertion site was then covered with an adhesive bandage and then covered with a pressure bandage.  There were no complications and the patient tolerated the procedure well.      ASSESSMENT:  Nexplanon insertion    PLAN:  The paperwork was filled out and the user care was given to the patient.    The patient was advised to call if she experienced any significant pain, redness or swelling at the incision site or over the марина.    Follow up prn Nexplanon check.  Patient to leave pressure dressing in place for 24h.  Patient to leave adhesive bandage in place for 3 days.  Bleeding precautions given, patient is to call for any heavy bleeding > 1 pad per hour, severe pain or fevers.

## 2021-06-10 NOTE — PROGRESS NOTES
Chief Complaint:  Chief Complaint   Patient presents with     Initial Prenatal Visit     HPI:   Ifeanyi Irizarry is a 17 y.o. female is here for pregnancy intake.  She is .  Patient's last menstrual period was 10/26/2016.  She had a missed  with fetal demise in 2016.  Ultrasound showed she had an 18 week gestation but no fetal heart tones.  She ultimately delivered tissue and was taken care of at [facility].  She did positive home pregnancy test about 2 months ago.  She is also been feeling fetal movement for about 2 months as well.  She has dropped out of school.  Not yet working on her GED.  She is living with her family.  She is in a relationship with the baby's father.    Past medical history: reviewed and updated.  Past Medical History:   Diagnosis Date     Chlamydia infection 2016     No past surgical history on file.    Current Outpatient Prescriptions:      prenatal vit-iron fumarate-FA (PRENATAL VITAMIN) 27-0.8 mg Tab, Take 1 tablet by mouth once daily., Disp: 100 tablet, Rfl: 3    Social:  Social History   Substance Use Topics     Smoking status: Never Smoker     Smokeless tobacco: None      Comment: no second hand smoke     Alcohol use No       OBJECTIVE:  No Known Allergies  Vitals:    17 1324   BP: 100/56   Pulse: 80   Resp: 16     There is no height or weight on file to calculate BMI.    Vital signs stable as recorded above  General: Patient is alert and oriented x 3, in no apparent distress  Fetal Exam: Fundal height was palpable at 22 cm, fetal heart tones are heard at 145 bpm.  Patient reports positive fetal movement.    Results:  Results for orders placed or performed in visit on 17   Pregnancy, Urine   Result Value Ref Range    Pregnancy Test, Urine Positive (!) Negative   Urinalysis, OB Screen   Result Value Ref Range    Glucose, UA Negative Negative    Ketones, UA Negative Negative    Protein, UA Negative Negative mg/dL     Other screening pregnancy lab  work is ordered and pending.    Patient scored a 1/30 on [place]  Depression Screen.    Assessment and Plan:  1. Pregnancy Intake Appointment.  Ifeanyi Irizarry is 17 y.o. and .  Patient's last menstrual period was 10/26/2016.  Estimated Date of Delivery: 17  Screening pregnancy lab work was drawn.  Prenatal vitamins prescribed.  Problem list and current medications reviewed and updated.  Dating ultrasound ordered.  Prenatal info packet given.    2.  History of missed  and fetal demise at 18 weeks in 2016.    3.  Pregnancy, age 17, dropped out of high school.  She is agreeable to a referral for public health nurse.  That was made today.    4.  Hepatitis B.  This was reviewed with patient today.  Screening labs pending.    Follow up in 2 weeks.  Please see OB Episode for complete details.  Visit was approximately 35 minutes, greater than 50% of time spent in face-to-face counseling and coordination of care.    This dictation uses voice recognition software, which may contain typographical errors.

## 2021-06-11 ENCOUNTER — OFFICE VISIT - HEALTHEAST (OUTPATIENT)
Dept: FAMILY MEDICINE | Facility: CLINIC | Age: 21
End: 2021-06-11

## 2021-06-11 DIAGNOSIS — Z30.46 NEXPLANON REMOVAL: ICD-10-CM

## 2021-06-11 ASSESSMENT — MIFFLIN-ST. JEOR: SCORE: 1236.42

## 2021-06-11 NOTE — PROGRESS NOTES
Long absence since last visit.  Discussed weekly follow up until delivery.  No labor signs.  GBS done.  Tdap done.  GCT done.    Schedule us with paucity of prenatal visits and measures small for dates  Has crib and car seat.

## 2021-06-11 NOTE — PROGRESS NOTES
No ctx, lof, bleeding, or dc.  No HA or vision changes.   Ordered BPP, dopplar, growth eval ultrasound.

## 2021-06-11 NOTE — PROGRESS NOTES
Discussed ultrasound results.  ISRAEL 10.8 and normal cord dopplers.  EFW 15%ile.  Femur length 2%ile    Would repeat growth ultrasound next week +/- BPP.    No ctx, lof, bleeding, or dc.  No HA or vision changes.

## 2021-06-12 NOTE — PROGRESS NOTES
No ctx, lof, bleeding, or dc.  No HA or vision changes.  Feels like she lost mucus plug.  Check BPP with growth today.  Consult with OB.  Set up induction for post date at 40 weeks 6 days.  Would still need cervical ripening as of today's exam.

## 2021-06-12 NOTE — PROGRESS NOTES
No ctx, lof, bleeding, or dc.  No HA or vision changes.  Reviewed ultrasound results.  EFW 20th percentile.  Femur length remains low.

## 2021-06-16 PROBLEM — O99.019 ANTEPARTUM ANEMIA: Status: ACTIVE | Noted: 2019-11-10

## 2021-06-19 NOTE — LETTER
Letter by Kimber Penaloza PA-C at      Author: Kimber Penaloza PA-C Service: -- Author Type: --    Filed:  Encounter Date: 9/9/2019 Status: (Other)         09/09/19    To Whom It May Concern:    Ifeanyi Irizarry was seen today at [facility] for Pregnancy Confirmation.    She is 8w0d.    Due Date: Estimated Date of Delivery: 4/20/20     Thank you.    Kimber Penaloza PA-C

## 2021-06-19 NOTE — LETTER
Letter by Taqueria Irizarry MD at      Author: Taqueria Irizarry MD Service: -- Author Type: --    Filed:  Encounter Date: 11/11/2019 Status: Signed         Ifeanyi Irizarry  [address]  [place] [address]             November 13, 2019         Dear Ms. Irizarry,    Below are the results from your recent visit:    Resulted Orders   ABO/RH Typing (OP order)   Result Value Ref Range    HML ABO/Rh Typing B POS     HML ABO/Rh Repeat Typing B POS    Hepatitis B Surface antigen (HBsAG)   Result Value Ref Range    Hepatitis B Surface Ag Positive, Previous Confirm (!) Negative   HIV Antigen/Antibody Screening Cascade   Result Value Ref Range    HIV Antigen / Antibody Negative Negative    Narrative    Method is Abbott HIV Ag/Ab for the detection of HIV p24 antigen, HIV-1 antibodies and HIV-2 antibodies.   HML Antibody Screen   Result Value Ref Range    HML Antibody Screen Negative Negative   RPR   Result Value Ref Range    Treponema Antibody (Syphilis) Negative Negative   Rubella Immune Status (IgG)   Result Value Ref Range    Rubella Antibody, IgG Positive     Narrative    Negative: Absence of detectable rubella virus IgG antibodies. A negative result presumes that immunity has not been acquired.     Equivocal: Suggest recollection.    Positive: Considered positive for IgG antibodies to rubella virus.   Urinalysis Macroscopic   Result Value Ref Range    Color, UA Yellow Colorless, Yellow, Straw, Light Yellow    Clarity, UA Clear Clear    Glucose, UA Negative Negative    Bilirubin, UA Negative Negative    Ketones, UA Negative Negative    Specific Gravity, UA 1.020 1.005 - 1.030    Blood, UA Trace (!) Negative    pH, UA 7.0 5.0 - 8.0    Protein, UA Negative Negative mg/dL    Urobilinogen, UA 0.2 E.U./dL 0.2 E.U./dL, 1.0 E.U./dL    Nitrite, UA Negative Negative    Leukocytes, UA Negative Negative   Culture, Urine   Result Value Ref Range    Culture No Growth    Lead, Blood   Result Value Ref Range    Lead        Comment:      Reflex testing  "sent to [facility]. Result to be reported on the separate reflexed test code.    Collection Method Venous    HM1 (CBC with Diff)   Result Value Ref Range    WBC 5.5 4.0 - 11.0 thou/uL    RBC 3.67 (L) 3.80 - 5.40 mill/uL    Hemoglobin 8.1 (L) 12.0 - 16.0 g/dL    Hematocrit 26.7 (L) 35.0 - 47.0 %    MCV 73 (L) 80 - 100 fL    MCH 22.1 (L) 27.0 - 34.0 pg    MCHC 30.3 (L) 32.0 - 36.0 g/dL    RDW 17.2 (H) 11.0 - 14.5 %    Platelets 242 140 - 440 thou/uL    MPV 11.3 8.5 - 12.5 fL    Neutrophils % 55 50 - 70 %    Lymphocytes % 33 20 - 40 %    Monocytes % 9 2 - 10 %    Eosinophils % 2 0 - 6 %    Basophils % 0 0 - 2 %    Neutrophils Absolute 3.1 2.0 - 7.7 thou/uL    Lymphocytes Absolute 1.8 0.8 - 4.4 thou/uL    Monocytes Absolute 0.5 0.0 - 0.9 thou/uL    Eosinophils Absolute 0.1 0.0 - 0.4 thou/uL    Basophils Absolute 0.0 0.0 - 0.2 thou/uL   Lead, Blood, Venouos   Result Value Ref Range    Lead, Blood (Venous) <2.0 0.0 - 4.9 ug/dL      Comment:      INTERPRETIVE INFORMATION: Lead, Blood (Venous)    Elevated results may be due to skin or collection-related   contamination, including the use of a noncertified lead-free tube.   If contamination concerns exist due to elevated levels of blood   lead, confirmation with a second specimen collected in a certified   lead-free tube is recommended.    Information sources for reference intervals and interpretive   comments include the \"CDC Response to the 2012 Advisory Committee   on Childhood Lead Poisoning Prevention Report\" and the   \"Recommendations for Medical Management of Adult Lead Exposure,   Environmental Health Perspectives, 2007.\" Thresholds and time   intervals for retesting, medical evaluation, and response vary by   state and regulatory body. Contact your State Department of Health   and/or applicable regulatory agency for specific guidance on   medical management recommendations.         Age            Concentration   Comment    All ages       5-9.9 ug/dL     " Adverse health   effects are                                  possible, particularly in                                 children under 6 years of                                 age and pregnant women.                                 Discuss health risks                                 associated with continued                                 lead exposure. For children                                 and women who are or may                                 become pregnant, reduce                                 lead exposure.                 All ages        10-19.9 ug/dL  Reduced lead exposure and                                 increased biological                                 monitoring are recommended.    All ages        20-69.9 ug/dL  Removal from lead exposure                                 and prompt medical                                 evaluation are recommended.                                 Consider chelation therapy                                 when concentrations exceed                                   50 ug/dL and symptoms of                                 lead toxicity are present.    Less than 19     Greater than  Critical. Immediate medical  years of age     44.9 ug/dL    evaluation is recommended.                                 Consider chelation therapy                                  when symptoms of lead                                 toxicity are present.    Greater than 19  Greater than  Critical. Immediate medical  years of age     69.9 ug/dL    evaluation is recommended                                 Consider chelation therapy                                 when symptoms of lead                                  toxicity are present.    Test developed and characteristics determined by [facility]. See Compliance Statement B: [employer]  Performed by [facility],  [address] [place] [id number] [phone]  [email or web address], Ramez Harper MD, Lab. Director         Blood level is low.  It is important to take the iron supplement and eat foods that have iron.    Please call with questions or contact us using [employer].    Sincerely,        Electronically signed by Taqueria Irizrary MD

## 2021-06-20 NOTE — LETTER
Letter by Catrachito Linares MD at      Author: Catrachito Linares MD Service: -- Author Type: --    Filed:  Encounter Date: 1/29/2020 Status: (Other)         Ifeanyi Irizarry  [address]  [place] [address]             February 4, 2020        Dear Ms. Irizarry,    Below are the results from your recent visit:      Resulted Orders   Urinalysis, OB Screen   Result Value Ref Range    Glucose, UA Negative Negative    Ketones, UA Negative Negative    Protein, UA Negative Negative mg/dL   Glucose,Gestational Challenge (1 Hour)   Result Value Ref Range    Glucose, Gestational Challenge 1hr 105 70 - 139 mg/dL    Patient Fasting > 8hrs? No    Syphilis Screen, Cascade   Result Value Ref Range    Treponema Antibody (Syphilis) Negative Negative   Hemoglobin   Result Value Ref Range    Hemoglobin 7.9 (LL) 12.0 - 16.0 g/dL   Chlamydia trachomatis & Neisseria gonorrhoeae, Amplified Detection   Result Value Ref Range    Chlamydia trachomatis, Amplified Detection Negative Negative    Neisseria gonorrhoeae, Amplified Detection Negative Negative           Your Hgb is lower. Have you been taking iron (ferrous sulfate) and vitamin c? If not, we would consider  IV treatment to get your iron levels up. Your blood level is low, we worry about starting low and then  having bleeding during delivery. I think it is important to take the iron (and Vitamin C helps more of this  absorb into the body). Our contact number is [phone], please do not hesitate to contact us.       Please call with questions or contact us using [employer].    Sincerely,        Electronically signed by Catrachito Linares MD

## 2021-06-20 NOTE — LETTER
Letter by Catrachito Linares MD at      Author: Catrachito Linares MD Service: -- Author Type: --    Filed:  Encounter Date: 1/29/2020 Status: (Other)         Ifeanyi Irizarry  [address]  [place] [address]             February 4, 2020    Dear Ms. Irizarry,    Below are the results from your recent visit:    Resulted Orders   Urinalysis, OB Screen   Result Value Ref Range    Glucose, UA Negative Negative    Ketones, UA Negative Negative    Protein, UA Negative Negative mg/dL   Glucose,Gestational Challenge (1 Hour)   Result Value Ref Range    Glucose, Gestational Challenge 1hr 105 70 - 139 mg/dL    Patient Fasting > 8hrs? No    Syphilis Screen, Cascade   Result Value Ref Range    Treponema Antibody (Syphilis) Negative Negative   Hemoglobin   Result Value Ref Range    Hemoglobin 7.9 (LL) 12.0 - 16.0 g/dL   Chlamydia trachomatis & Neisseria gonorrhoeae, Amplified Detection   Result Value Ref Range    Chlamydia trachomatis, Amplified Detection Negative Negative    Neisseria gonorrhoeae, Amplified Detection Negative Negative        Your Hgb is lower. Have you been taking iron (ferrous sulfate) and vitamin c? If not, we would consider  IV treatment to get your iron levels up. Our contact number is [phone].    Please call with questions or contact us using [email or web address].    Sincerely,      Electronically signed by Catrachito Linares MD

## 2021-06-20 NOTE — PROGRESS NOTES
Chief Complaint:  Chief Complaint   Patient presents with     Pregnancy Confirmation     #3     HPI:   Ifeanyi Irizarry is a 18 y.o. female is here for pregnancy intake.  She is .  Patient's last menstrual period was 2018 (approximate).  Positive home pregnancy test yesterday.  LMP was either 18 or 18.  She is living with baby's father, working, has not finished high school.    Past medical history: reviewed and updated.  Past Medical History:   Diagnosis Date     Chlamydia infection 2016     History of fetal demise 2016    missed AB 18 weeks gestation      (normal spontaneous vaginal delivery)      Short cervix during pregnancy in third trimester 2017     Short femur of fetus on prenatal ultrasound 8/3/2017     No past surgical history on file.  No current outpatient prescriptions on file.    Social:  Social History   Substance Use Topics     Smoking status: Never Smoker     Smokeless tobacco: Never Used      Comment: no second hand smoke     Alcohol use No       OBJECTIVE:  No Known Allergies  Vitals:    18 1513   BP: 90/64   Pulse: 80   Resp: 20     Body mass index is 19.12 kg/(m^2).    Vital signs stable as recorded above  General: Patient is alert and oriented x 3, in no apparent distress  Fetal Exam: Fundal height was not palpable, fetal heart tones are not heard.    Results:  Results for orders placed or performed in visit on 18   Pregnancy (Beta-hCG, Qual), Urine   Result Value Ref Range    Pregnancy Test, Urine Positive (!) Negative     Other screening pregnancy lab work is ordered and pending.    Patient scored a 2/30 on [place]  Depression Screen.    Assessment and Plan:  1. Pregnancy Intake Appointment.  Ifeanyi Irizarry is 18 y.o. and .  Patient's last menstrual period was 2018 (approximate).  Estimated Date of Delivery: 19  She will be seeing Dr. Linares for OB care.  Screening pregnancy lab work was drawn.  Prenatal vitamins  prescribed.  Problem list and current medications reviewed and updated.  Dating ultrasound ordered.  Prenatal info packet given.  First trimester genetic screening was discussed with patient.  She declines.  Living with baby's father, working, has not finished high school.  We will discuss if she would like a public health nurse when I call her about ultrasound results.    2.  History of fetal demise at 18 weeks in 2016.    3.  History of short cervix during third trimester of last pregnancy in 2017.  She declined treatment.  Delivered at term.    Follow up in 3-6 weeks.  Please see OB Episode for complete details.  Visit was approximately 35 minutes, greater than 50% of time spent in face-to-face counseling and coordination of care.    This dictation uses voice recognition software, which may contain typographical errors.

## 2021-06-20 NOTE — PROGRESS NOTES
10-1-18  Attempt #1  Called and left voice message to call Care Guide at [phone] to discuss Clinic Care Coordination per PCP order.  No upcoming appt at this time.    Reach out 10-10-18 Attempt #2

## 2021-06-21 NOTE — PROGRESS NOTES
10-12-18  Received return call from patient.  Explained reasons for the call and described Clinic Care Coordination.  Patient declined enrollment in clinic care coordination at this time.     The patient was provided with contact information for the clinic care guide if their needs changed.    Patient declined CCC 10-12-18

## 2021-06-21 NOTE — PROGRESS NOTES
Discussed screening for aneuploidy and neural tube defects. Patient declined screening other than 20 week ultrasound    Carrier screening for SMA, CF, thalassemia, fragile X discussed.  She declined this screening    Reviewed intake exam, labs, DELROY, past medical history, past surgical history, family history, genetic history, and previous obstetrical history.

## 2021-06-21 NOTE — PROGRESS NOTES
10-12-18  2nd Attempt to outreach to enroll in CCC.  Called and left voice message to call Care Guide at [phone] to discuss Clinic Care Coordination per PCP order.  Upcoming appt 11-1-18 at 1:30pm with Dr. Linares     Reach out 10-25-18

## 2021-06-22 NOTE — PATIENT INSTRUCTIONS - HE
Patient Education   HEALTHY PREGNANCY CARE: 22-26 WEEKS PREGNANT    You are finishing your second trimester. Your baby is developing rapidly. At this stage, babies have a sense of balance, can respond to touch, and are recognizing parent voices.  Your baby will be moving around more now.  You may notice Cain-Robertson contractions now, which are painless and prepare the uterus for the delivery.    Nutrition: During this time, you may find that your nausea and fatigue are gone. Overall, you may feel better and have more energy than you did in your first trimester. Be sure you are getting enough calcium and iron in your diet. Your prenatal vitamins cannot supply all of the nutrients you need, so continue to eat 3-4 servings of dairy foods and 2-3 servings of meat/fish/poultry/nuts every day. Foods high in iron include: red meats, eggs, dark green vegetables, dark yellow vegetables, nuts, kidney beans and chickpeas. Some cereals are fortified with iron, so look at the food labels for 100% of the daily requirement for iron.     Discuss your work situation with your midwife or physician as needed. If you stand for long periods of time, you may need to make changes and take breaks.    [place] for childbirth and parenting classes, including an infant CPR class. Breastfeeding classes are recommended too.    Plan for the gestational diabetes screening between weeks 24-28. You can eat normally before that visit; we would suggest making sure you have protein foods, but not a lot of carbohydrates or sugary foods.    Your blood type was determined at your first OB appointment. If you are Rh negative, you should discuss the need for a Rhogam shot with your midwife or physician. This would be administered around 28 weeks if necessary.    How can you care for yourself at home?   You can refer to the Starting Out Right book or find it online at [email or web address] or  "[email or web address]/[email or web address]-starting-out-[email or web address]-starting-out-[email or web address]#p=8     You can sign up for a weekly parenting e-mail that gives support, tips and advice from health care professionals that starts with pregnancy and continues through the toddler years. To register, go to [email or web address] at any time during your pregnancy.    Watch for the warning signs of premature labor:   \" Dull, low backache  \" Contractions of the uterus, menstrual-like cramps  \" Abdominal cramping with or without diarrhea  \" More pelvic pressure  \" Increase or change in vaginal discharge.     Continue to watch for signs and symptoms of preeclampsia:   \" Sudden swelling of your face, hands, or feet   \" New vision problems such as blurring, double vision, or flashing lights  \" A severe headache not relieved with acetaminophen (Tylenol)  \" Sharp or stabbing pain in your right or middle upper abdomen    Remember that labor doesn't have to hurt. Never hesitate to call your midwife or physician or their staff at [facility] FAMILY MEDICINE/OB at Phone: [phone] for any one of these warning signs - or if something just doesn't feel right. If it's after clinic hours, physician patients should call the [facility] at [phone] ([phone]); midwife patients should call their answering service at [phone].      Baby Feeding in the Hospital: Information, Support and Resources    As you prepare for the birth of your child, you will want to consider options for feeding your baby including breast-feeding and/or baby formula. The American Academy of Pediatrics recommends exclusive breast-feeding for the first six months (although any amount of breast-feeding is beneficial).  However, we also understand that breast-feeding is a personal choice and not for everyone. Whether or not you choose to breast-feed, your decision will be respected by our staff.    There are numerous benefits of " breast-feeding; here are a few to consider:    Provides antibodies to protect your baby from infections and diseases    The cost: formula can cost over $1,500 per year    Convenience, no warming up or sterilizing bottles and supplies    The physical contact with breastfeeding can make babies feel secure, warm and comforted    What ever my feeding choice, what can I expect after I deliver my baby?    Your baby will usually be placed skin-to-skin immediately following birth. The skin to skin contact between you and your baby will be a special and memorable time. The bonding and attachment comforts your baby and has a positive effect on baby s brain development.     Having your baby  room in  with you also helps you start to learn your baby s body rhythms and sleep cycle.      You will also begin to learn your baby s cues (signals) that he or she is ready to feed.    When do I start to feed my baby?  As soon as possible after your baby s birth, you will be encouraged to begin feeding.  In the first couple of weeks, your baby will eat often.  Breastfeeding babies usually eat at least 8 times in 24 hours.  Babies fed formula usually eat at least 7 times in 24 hours.      Breast-feeding tips:    Get comfortable and use pillows for support.    Have your baby at the level of your breast, facing you,  tummy to tummy .      Touch your nipple to your baby s lips so you baby s mouth opens wide (rooting reflex).  Aim the nipple toward the roof of your baby s mouth. When your baby opens his or her mouth, pull your baby toward your breast to help your baby  latch on  to your nipple and much of the areola area.    Hand expressing your breast milk can assist with latching your baby to your breast, if needed.    Ask for help, breastfeeding may seem awkward or uncomfortable at first, this is normal. There are numerous resources available at [facility], Clinics and beyond.     If your goal is to exclusively breastfeed, avoid  using any formula or artificial nipples (including bottles and pacifiers) while you are your baby are learning to breastfeed unless there is a medical reason.       Mixing breastfeeding and formula can interfere with how you begin building your milk supply.  It can impact how you and your baby  learn  to breastfeeding together and alter the natural growth of  good  bacteria in your baby s stomach.    Delay a pacifier or a bottle in the first few weeks until breastfeeding is well established. This is often around 3 weeks of age.    Ask your nurse to show you how to hand express.   Breast milk can be kept in the refrigerator or freezer for later use.    Hospital Resources:  [facility] located at [facility], [facility] and [facility]  Call: [phone].    Inpatient support    Outpatient appointments    Telephone consultation    Breast-feeding classes available through [employer]      Online Resources:    [email or web address] sign up for free online weekly e-mail    [email or web address]    Breastfeedingmadesimple.[email or web address]    Llli.org (La Leche League)    Normalfed.com    [email or web address]    Workandpump.com    Breast-feeding Supplies & Pumps:  Talk to your insurance provider or WIC (Women, Infants and Children) to learn more about options available to you. Recent health insurance changes may include additional coverage for supplies and pumps.    Public Health:  Women, Infants and Children Nutrition program (WIC): provides breast-feeding support and education in addition to formal feeding moms. 860-QGQ-0219 or [email or web address]    Family Health Home Visiting: Public Health Nurse home visits are available. Talk to your provider to see if you qualify. Most [facility] have a program available.    Additional Resources:  La Leche League is an international, nonprofit, nonsectarian organization offering information, education,  and support to mothers who want to breast-feed their babies. Local groups offer phone help and monthly meetings. Visit [employer].org or [employer].org and us the  Find local support  drop down menu or click on the  Resources  tab.    [place] Breastfeeding Resources: 8-968-098-BABY (6001) toll free    National Breastfeeding Help Line trained breastfeeding peer counselors can help answer common breast-feeding questions by phone. Monday-Friday: English/Arabic  7-465- [phone] toll free, [phone] (TTY)    [facility] Connection: [phone] ([phone])

## 2021-06-22 NOTE — PROGRESS NOTES
No ctx, lof, bleeding, or dc.  No HA or vision changes.  Breast feeding discussed.  She has no interest.  Discussed risks/benefits of breast feeding vs. formula    Will repeat ultrasound to get the views that were not able to be obtained on the first scan.

## 2021-06-23 NOTE — PROGRESS NOTES
Feeling ok.  No concerns.  No ctx, lof, bleeding, or dc.  No HA or vision changes.   Breast feeding discussed.  She has no interest.  Unsure of contraception plan after this pregnancy but would like a break before further pregnancies.

## 2021-06-23 NOTE — PATIENT INSTRUCTIONS - HE
Patient Education   HEALTHY PREGNANCY CARE: 30-34 WEEKS PREGNANT    You have made it to the final months of your pregnancy. By now, your baby is starting to fill out with some fat under his skin, fuzzy hair on his shoulders, and is gaining 4 to 6 ounces per week.    Discuss any travel plans with your midwife or physician.    Review possible changes in sexuality during later pregnancy and discuss these with your midwife or physician, as well as your partner. Alternative love-making positions may be more comfortable.    Discuss labor and delivery issues with your midwife or physician. If you had a  birth in the past, discuss a trial of labor with your midwife or physician. He or she may ask that you sign a consent form, if you wish to have a vaginal birth after  (). Ask your midwife or physician to explain your options for managing pain during your labor and delivery. Sometimes, during the birth process, an episiotomy may need to be cut in the vagina to make the opening bigger or let the baby come out quicker. You may want to discuss the episiotomy and how often it is needed with your midwife or physician.    Plan for your baby's care by selecting a child health care provider (Family physician, Pediatrician, or Pediatric Nurse Practitioner). Practice installing an infant car seat correctly in the car. Ask for car seat information as needed and make sure it is safe and will work in the car your baby will ride in. You will need a car seat to bring your baby home from the hospital. Check the procedure for adding your baby to your health care plan. Review your decision about circumcision and ask for any information you need. As you buy and receive items for your baby, don't put a baby walker on your list. Walkers can be dangerous and can cause serious injury to your child. A safer option is a saucer-type play station, since it doesn't allow baby to travel across the floor.    Discuss your choices and  plans for birth control with your midwife or physician. Women who are breastfeeding can still become pregnant. Use a birth control method if you want to lower your pregnancy risk. Talk to your midwife or physician if you are considering permanent birth control, such as tubal ligation or Essure. You may need to complete a consent form 30 days prior to delivery in order to have this done after you deliver.    Continue to watch for signs and symptoms of preeclampsia:     Sudden swelling of your face, hands, or feet     New vision problems such as blurring, double vision, or flashing lights    A severe headache not relieved with acetaminophen (Tylenol)    Sharp or stabbing pain in your right or middle upper abdomen    Watch for signs and symptoms of premature labor:     Regular contractions. This means having about 6 or more within 1 hour, even after you have had a glass of water and are resting.     A backache that starts and stops regularly.    An increase or change in vaginal discharge, such as heavy, mucus-like, watery, or bloody discharge.     Your water breaks or leaks.    If you have any of the above symptoms or any other concerns, call your provider or their clinic staff at [facility] FAMILY MEDICINE/OB at Phone: [phone]. If it's after clinic hours, physician patients should call the [facility] at [phone] ([phone]); midwife patients should call their answering service at [phone].    How can you care for yourself at home?   You can refer to the Starting Out Right book or find it online at [email or web address] or [email or web address].[email or web address]/images/stories/flipbooks/[facility]-starting-out-right/[facility]-starting-out-right.html#p=8     You can sign up for a weekly parenting e-mail that gives support, tips and advice from health care professionals that starts with pregnancy and continues through the toddler years. To register,  go to [email or web address] at any time during your pregnancy.

## 2021-06-24 NOTE — PATIENT INSTRUCTIONS - HE
Patient Education   HEALTHY PREGNANCY CARE: 34-36 WEEKS PREGNANT    Your baby is gaining about an ounce each day, so healthy nutrition and rest are still very important. Learn about late pregnancy symptoms, such as constipation and backaches. Drinking more fluids and eating more fiber can relieve constipation. The pelvic tilt and a heating pad can ease backaches.    Continue to watch for signs and symptoms of preeclampsia:     Sudden swelling of your face, hands, or feet     New vision problems such as blurring, double vision, or flashing lights    A severe headache not relieved with acetaminophen (Tylenol)    Sharp or stabbing pain in your right or middle upper abdomen    You're almost done with your pregnancy but it's still too soon to have your baby. The goal is to have your baby after 37 weeks, so watch for signs and symptoms of premature labor:     Regular contractions. This means having about 6 or more within 1 hour, even after you have had a glass of water and are resting.     A backache that starts and stops regularly.    An increase or change in vaginal discharge, such as heavy, mucus-like, watery, or bloody discharge.     Your water breaks or leaks.    You will be tested for group B streptococcus (GBS), a type of bacteria found in 10-30% of pregnant women. A woman with GBS can pass it to her baby during delivery. Most babies who get GBS from their mothers do not have any problems, but some babies get very ill and need to be hospitalized for antibiotic treatment. Treating you with antibiotics during labor and delivery helps to prevent infection in your baby.    Review information on postpartum care that you will need after the baby is born.  Discuss your choices and plans for birth control with your midwife or physician.     Postpartum Care  During the days and weeks after the delivery of your baby (postpartum period), your body will change as it returns to its nonpregnant condition. As with pregnancy  "changes, postpartum changes are different for every woman.    Physical changes after childbirth  The changes in your body may include:    Contractions called afterpains shrink the uterus for several days after childbirth. Shrinking of the uterus to its prepregnancy size may take 6 to 8 weeks.    Sore muscles (especially in the arms, neck, or jaw) are common after childbirth. This is because of the hard work of labor and pushing your baby out. The soreness should go away in a few days.    Bleeding and vaginal discharge (lochia) may last for 2 to 8 weeks, and can come and go for about 2 months.    Vaginal soreness, including pain, discomfort, and numbness, is common after vaginal birth. Soreness may be worse if you had a perineal tear or episiotomy.    If you had a  birth (), you may have pain in your lower abdomen and may need pain medicine for 1 to 2 weeks.    Breast engorgement is common around the 3rd or 4th day after delivery, when the breasts begin to fill with milk. This can cause discomfort and swelling. If you are breast feeding, the best treatment is to feed your baby often or pump the milk out. You can also use warm compresses. If you are not breast feeding, placing ice packs on your breasts, taking a hot shower, or using warm compresses may relieve the discomfort.    Be aware of postpartum depression    \"Baby blues\" are common for the first 1 to 2 weeks after birth. You may cry or feel sad or irritable for no reason.     For some women, these feelings last longer and are more intense. This is called postpartum depression.     If your symptoms last for more than a few weeks or you feel very depressed, ask your midwife or physician for help.     Postpartum depression can be treated. Support groups and counseling can help, but sometimes medication is needed.     Discuss follow-up appointments with your midwife or physician, as well. He or she will usually want to see you 6 weeks after the " birth of your baby, sooner if you are having problems.    If you have questions about any symptoms you are experiencing or any other concerns, call your provider or their clinic staff at [facility] at Phone: [phone]. If it's after clinic hours, physician patients should call the [facility] at [phone] ([phone]); midwife patients should call their answering service at [phone].    How can you care for yourself at home?   You can refer to the Starting Out Right book or find it online at [email or web address]/[email or web address]/[email or web address]/[email or web address]-Right.pdf or [email or web address]/images/[email or web address]/flipbooks/[email or web address]-starting-out-[email or web address]-starting-out-right.html#p=8     You can sign up for a weekly parenting e-mail that gives support, tips and advice from health care professionals that starts with pregnancy and continues through the toddler years. To register, go to [email or web address] at any time during your pregnancy.        Making Plans for Feeding My Baby    By this point, you probably have read a lot about feeding your baby.  Breastfeed or formula? Each mother s decision is her own and [facility] respects you and your choices. We ve gathered information on both breastfeeding and formula feeding to help with your decision. Talking with your physician or nurse-midwife can also help in your decision.  However you plan to feed your baby, [facility] encourage rooming in with your baby, skin-to-skin contact and feeding your baby based on his or her cues.    Skin-to-skin contact  Being close to mom helps your baby adjust to life outside of the womb.  It helps your baby regulate their body temperature, heart rate, and breathing.  Your baby will usually be placed skin-to-skin immediately following birth or as soon as possible, if medical intervention is needed.    Rooming-In  Having your  baby stay with you in your room is called  rooming-in .  Keeping your baby in your room helps you to learn how to care for your baby by getting to know your baby s cues, body rhythms and sleep cycle.       Cue-based feeding  Cues (signals) are baby s way of telling you what he or she wants.  When you learn your infant s cues, you know how to care for and feed your baby.   Feeding cues are the licking and smacking of lips, bringing their fist to their mouth, and a reflex called  rooting - where baby turns and opens his or her mouth, searching for the breast or bottle.  Crying is a late feeding cue.  Babies can feed frequently, often at least 8 times in 24 hours.  Breastfeeding facts  Breast milk is the best source of nutrition for your baby and is available at birth. In the first couple of days, your milk volume is already starting to increase, though it may not be noticeable. Breastfeed frequently to increase your milk supply. Within three to five days, you will begin to notice larger milk volumes. An increase in breast size, heaviness and firmness are often described as the milk  coming in.  Frequent breastfeeding can help breasts from getting overly firm and painful. You will know the baby is getting enough milk if your baby is having wet and dirty diapers and gaining weight.     If your goal is to exclusively breastfeed, it is important to not use any formula or artificial nipples (including bottles and pacifiers) while your baby is learning to breastfeed.  While it may seem like an  easy  option to give your baby a bottle, formula should only be given if there is a medical reason for your baby to have it.    Positioning and attachment   Get comfortable.  Use pillows as needed to support your arms and baby.  Hold baby close at the level of your breast, facing you in a tummy to tummy position.  Skin to skin helps with this.  Position the baby with his or her nose by the nipple.  There should be a straight line  from baby s ear to shoulder to hips.  Tickle your baby s lips or wait for baby to open mouth wide, bring baby to breast by leading with the chin.  Aim the nipple at the roof of baby s mouth.  A rapid sucking pattern is followed by longer, drawing pattern with occasional swallows heard.  When baby is correctly latched, your nipple and much of the areola are pulled well into baby s mouth.      Returning to work or school  Focus on a good start to breastfeeding.  Many women continue to provide breastmilk for their baby when they return to work or school.  Making plans about where to pump and store milk can make the transition go well.  Talk with other mothers who have also returned to work or school for tips and support.  Your employer s Human Resource department may be a resource as well.     Returning to work or school: (continued)     babies can mean fewer  sick  days for you.    A quality breast pump will also save time and add comfort.  Check with your insurance prior to giving birth for breast pump coverage.  Many insurance companies include a pump within your benefits.    Wait until your baby is at least three weeks old to introduce a bottle for the first time.  Have someone besides you give the bottle.    Breastfeed when you are with your baby. Reserve your bottles of breast milk for when you are away.     Your breasts will need to be  emptied  either by your baby or a pump.  Plan to pump at least twice in an eight hour day.    If you cannot pump at work, continue breastfeeding at home. Any amount of breast milk is worth giving to your baby.    Formula feeding facts  If you are planning to use formula to feed your baby, you will want to make some preparations ahead of time. Talk to your doctor or nurse-midwife about what type of formula to use. Some are iron-fortified, meaning they have extra iron in them. You will want to purchase formula and bottles before your baby is born to be sure you are ready  after you return from the hospital. The [facility] do not provide formula samples to take home.    Be sure to follow formula mixing directions closely. Regular milk in the dairy case at the grocery store should not be given to babies under 1 year old. Baby formula is sold in several forms including:    Ready-to-use. This is the most expensive, but no mixing is necessary.    Concentrated liquid. This is less expensive than ready-to-use and you mix with water.    Powder. This is the least expensive. You mix one level scoop of powdered formula with two ounces of water and stir well.    Most babies need 2.5 ounces of formula per pound of body weight each day. This means an 8-pound baby may drink about 20 ounces of formula a day; however, this is just an estimate. The most important thing is to pay attention to your baby s cues.  If your baby is always fussy, needs more iron or has certain food allergies, your physician may suggest you change your baby s formula to a different kind.   How do I warm my baby s bottles?  You may feed your baby a bottle without warming it first. It is OK for the breast milk or formula to be cool or room temperature. If your baby seems to prefer it warmed, you can put the filled bottle in a container of warm water and let it stand for a few minutes. Check the temperature of the liquid on your skin before feeding it to your baby; to be sure it isn t too hot. Do not heat bottles in the microwave. Microwaves heat food and liquids unevenly, and this can cause hot spots that can burn your baby.    How do I clean and sterilize bottles?  Sterilize bottles and nipples before you use them for the first time. You can do this by putting them in boiling water for 5 minutes. After that first time, you can wash them in hot and soapy water. Rinse them carefully to be sure there is no soap left on them. You can also wash them in the .    [facility] [phone] ([phone])

## 2021-06-26 NOTE — PROGRESS NOTES
Subjective:    Ifeanyi Irizarry is a 21 y.o. female who presents for Nexplanon removal.  She had Nexplanon placed about 1 year ago.  She would like it removed.  Wt Readings from Last 3 Encounters:   06/11/21 116 lb (52.6 kg)   08/29/20 110 lb (49.9 kg)   05/19/20 109 lb (49.4 kg)     She thinks she has gained weight since it was placed.  According to her chart, she has gained approximately 7 pounds.  She does not want any other kinds of birth control today.    Patient Active Problem List   Diagnosis     Hepatitis B     Antepartum anemia       Current Outpatient Medications:      docusate sodium (COLACE) 100 MG capsule, Take 1 capsule (100 mg total) by mouth daily., Disp: 45 capsule, Rfl: 0     ibuprofen (ADVIL,MOTRIN) 800 MG tablet, Take 1 tablet (800 mg total) by mouth every 8 (eight) hours., Disp: 60 tablet, Rfl: 0    Current Facility-Administered Medications:      lidocaine 1%-EPINEPHrine 1:100,000 1 %-1:100,000 injection 2.5 mL (XYLOCAINE W/EPI), 2.5 mL, Intradermal, Once, Kimber Penaloza PA-C      Tobacco Use      Smoking status: Never Smoker      Smokeless tobacco: Never Used      Tobacco comment: no second hand smoke      Objective:   Allergies:  Patient has no known allergies.    Vitals:    06/11/21 1011   BP: 98/60   Pulse: 88     Body mass index is 21.57 kg/m .    General: Alert and oriented x 3, in no apparent distress    Procedure:  Left upper inner arm was adequately anesthetized with 2.5 cc of lidocaine with Epi.  Then, using sterile technique, 5 mm incision was made with an 11 blade.  Nexplanon was palpated subcutaneously and removed with a hemostat clamp.  Incision site was closed with steri-strips and wrapped with a pressure bandage.  Patient was neurovascularly intact after exam.  Appropriate wound aftercare was dicussed with patient.         Assessment and Plan:     1. Nexplanon removal  Nexplanon was removed today.  Patient is aware it is possible to become pregnant as soon as Nexplanon is  removed.  She declines other offers of birth control today.      This dictation uses voice recognition software, which may contain typographical errors.

## 2021-07-03 NOTE — ADDENDUM NOTE
Addendum Note by Cipriano Penaloza PA-C at 9/18/2018  9:03 AM     Author: Cipriano Penaloza PA-C Service: -- Author Type: Physician Assistant    Filed: 9/18/2018  9:03 AM Encounter Date: 9/7/2018 Status: Signed    : Cipriano Penaloza PA-C (Physician Assistant)    Addended by: CIPRIANO PENALOZA on: 9/18/2018 09:03 AM        Modules accepted: Orders

## 2021-07-03 NOTE — ADDENDUM NOTE
Addendum Note by Catrachito Vargas MD at 7/7/2017  5:36 PM     Author: Catrachito Vargas MD Service: -- Author Type: Physician    Filed: 7/7/2017  5:36 PM Encounter Date: 7/7/2017 Status: Signed    : Catrachito Vargas MD (Physician)    Addended by: CATRACHITO VARGAS on: 7/7/2017 05:36 PM        Modules accepted: Orders

## 2021-07-06 VITALS
HEIGHT: 61 IN | HEART RATE: 88 BPM | DIASTOLIC BLOOD PRESSURE: 60 MMHG | BODY MASS INDEX: 21.9 KG/M2 | SYSTOLIC BLOOD PRESSURE: 98 MMHG | WEIGHT: 116 LBS

## 2021-07-13 ENCOUNTER — OFFICE VISIT (OUTPATIENT)
Dept: FAMILY MEDICINE | Facility: CLINIC | Age: 21
End: 2021-07-13
Payer: COMMERCIAL

## 2021-07-13 VITALS
DIASTOLIC BLOOD PRESSURE: 69 MMHG | HEART RATE: 83 BPM | BODY MASS INDEX: 21.19 KG/M2 | SYSTOLIC BLOOD PRESSURE: 101 MMHG | WEIGHT: 114 LBS

## 2021-07-13 DIAGNOSIS — L98.9 SKIN LESION: Primary | ICD-10-CM

## 2021-07-13 PROCEDURE — 99213 OFFICE O/P EST LOW 20 MIN: CPT | Performed by: PHYSICIAN ASSISTANT

## 2021-07-13 NOTE — PROGRESS NOTES
Subjective:      Ifeanyi Irizarry is a 21 year old female with chief complaint of mole.  She has had a spot on her back for a few months.  She thinks it might be getting bigger.  She has never had any significant skin problems or history of skin cancer.  No family history of skin cancer.  After reading about skin lesions on the Internet, she is worried that it may be cancerous.  She is a non-smoker.    Patient Active Problem List   Diagnosis     Hepatitis B     Antepartum anemia       Current Outpatient Medications:      docusate sodium (COLACE) 100 MG capsule, [DOCUSATE SODIUM (COLACE) 100 MG CAPSULE] Take 1 capsule (100 mg total) by mouth daily. (Patient not taking: Reported on 7/13/2021), Disp: 45 capsule, Rfl: 0     ibuprofen (ADVIL,MOTRIN) 800 MG tablet, [IBUPROFEN (ADVIL,MOTRIN) 800 MG TABLET] Take 1 tablet (800 mg total) by mouth every 8 (eight) hours. (Patient not taking: Reported on 7/13/2021), Disp: 60 tablet, Rfl: 0    Objective:     Allergies:  Patient has no known allergies.    Vitals:  /69 (BP Location: Left arm, Patient Position: Sitting, Cuff Size: Adult Regular)   Pulse 83   Wt 51.7 kg (114 lb)   LMP 07/13/2021   BMI 21.19 kg/m    Body mass index is 21.19 kg/m .    Vital signs reviewed.  General: Patient is alert and oriented x 3, in no apparent distress  Skin: Small somewhat pedunculated soft lesion present right above the left buttock, 4 to 5 mm in diameter, central area is hyperpigmented      Assessment and Plan:   1. Skin lesion  This appears to be benign today.  Most consistent with skin tag or other benign lesion.  I discussed with patient the only way to know for sure if something is benign is to have pathology done on it.  Given its location, she may want it removed anyway, because it seems to catch on things.  She thinks that as long as it seems benign, she does not want to do a referral to Derm.  She will watch this for 1-2 months.  If she feels like lesion is growing or changing, she  will let us know.  She is aware she can contact us to get Derm referral at any time.    This dictation uses voice recognition software, which may contain typographical errors.

## 2021-07-14 PROBLEM — Z34.90 PREGNANT: Status: RESOLVED | Noted: 2019-04-13 | Resolved: 2019-04-13

## 2021-07-14 PROBLEM — O35.HXX0 SHORT FEMUR OF FETUS ON PRENATAL ULTRASOUND: Status: RESOLVED | Noted: 2017-08-03 | Resolved: 2018-09-05

## 2021-07-14 PROBLEM — Z34.90 PREGNANT: Status: RESOLVED | Noted: 2017-08-04 | Resolved: 2017-08-05

## 2021-07-14 PROBLEM — O99.019 ANEMIA AFFECTING PREGNANCY: Status: RESOLVED | Noted: 2017-07-07 | Resolved: 2019-09-09

## 2021-07-14 PROBLEM — Z34.90 PREGNANT: Status: RESOLVED | Noted: 2020-04-15 | Resolved: 2020-05-19

## 2021-07-14 PROBLEM — O26.873 SHORT CERVIX DURING PREGNANCY IN THIRD TRIMESTER: Status: RESOLVED | Noted: 2017-05-04 | Resolved: 2019-04-13

## 2021-07-14 PROBLEM — Z34.90 NORMAL PREGNANCY: Status: RESOLVED | Noted: 2017-05-03 | Resolved: 2019-04-13

## 2021-07-14 PROBLEM — Z34.80 SUPERVISION OF OTHER NORMAL PREGNANCY, ANTEPARTUM: Status: RESOLVED | Noted: 2019-11-10 | Resolved: 2020-05-19

## 2021-10-13 ENCOUNTER — TELEPHONE (OUTPATIENT)
Dept: FAMILY MEDICINE | Facility: CLINIC | Age: 21
End: 2021-10-13

## 2021-10-13 NOTE — TELEPHONE ENCOUNTER
Reason for Call:  Same Day Appointment, Requested Provider:      PCP: No primary care provider on file.    Reason for visit: Pregnancy confirmation, patient took home pregnancy tests.    Duration of symptoms:     Have you been treated for this in the past? na    Additional comments: Please call patient to assist with scheduling pregnancy confirmation  Can we leave a detailed message on this number? YES    Phone number patient can be reached at: Cell number on file:    Telephone Information:   Mobile [phone]       Best Time:  anytime    Call taken on 10/13/2021 at 2:46 PM by Jacque Tiwari

## 2021-10-14 NOTE — TELEPHONE ENCOUNTER
Left message #1 at [phone]. Postponing task out to a week and will try again. If patient returns call back, please help patient schedule an appointment per message below. Thanks!

## 2021-11-09 ENCOUNTER — OFFICE VISIT (OUTPATIENT)
Dept: FAMILY MEDICINE | Facility: CLINIC | Age: 21
End: 2021-11-09
Payer: COMMERCIAL

## 2021-11-09 ENCOUNTER — HOSPITAL ENCOUNTER (OUTPATIENT)
Dept: ULTRASOUND IMAGING | Facility: CLINIC | Age: 21
Discharge: HOME | End: 2021-11-09
Attending: PHYSICIAN ASSISTANT | Admitting: PHYSICIAN ASSISTANT
Payer: COMMERCIAL

## 2021-11-09 VITALS
BODY MASS INDEX: 22.68 KG/M2 | RESPIRATION RATE: 16 BRPM | TEMPERATURE: 98.2 F | WEIGHT: 122 LBS | SYSTOLIC BLOOD PRESSURE: 88 MMHG | DIASTOLIC BLOOD PRESSURE: 58 MMHG | HEART RATE: 86 BPM

## 2021-11-09 DIAGNOSIS — B18.1 HEPATITIS B, CHRONIC (H): ICD-10-CM

## 2021-11-09 DIAGNOSIS — N92.6 IRREGULAR MENSES: Primary | ICD-10-CM

## 2021-11-09 DIAGNOSIS — Z3A.08 8 WEEKS GESTATION OF PREGNANCY: ICD-10-CM

## 2021-11-09 LAB
ABO/RH(D): NORMAL
ALT SERPL W P-5'-P-CCNC: 14 U/L (ref 0–45)
ANTIBODY SCREEN: NEGATIVE
BASOPHILS # BLD AUTO: 0.1 10E3/UL (ref 0–0.2)
BASOPHILS NFR BLD AUTO: 1 %
EOSINOPHIL # BLD AUTO: 0.1 10E3/UL (ref 0–0.7)
EOSINOPHIL NFR BLD AUTO: 2 %
ERYTHROCYTE [DISTWIDTH] IN BLOOD BY AUTOMATED COUNT: 12.6 % (ref 10–15)
HBA1C MFR BLD: 5.3 % (ref 0–5.6)
HCG UR QL: POSITIVE
HCT VFR BLD AUTO: 36.5 % (ref 35–47)
HGB BLD-MCNC: 12 G/DL (ref 11.7–15.7)
HIV 1+2 AB+HIV1 P24 AG SERPL QL IA: NEGATIVE
IMM GRANULOCYTES # BLD: 0 10E3/UL
IMM GRANULOCYTES NFR BLD: 0 %
LYMPHOCYTES # BLD AUTO: 2.2 10E3/UL (ref 0.8–5.3)
LYMPHOCYTES NFR BLD AUTO: 35 %
MCH RBC QN AUTO: 28 PG (ref 26.5–33)
MCHC RBC AUTO-ENTMCNC: 32.9 G/DL (ref 31.5–36.5)
MCV RBC AUTO: 85 FL (ref 78–100)
MONOCYTES # BLD AUTO: 0.6 10E3/UL (ref 0–1.3)
MONOCYTES NFR BLD AUTO: 9 %
NEUTROPHILS # BLD AUTO: 3.3 10E3/UL (ref 1.6–8.3)
NEUTROPHILS NFR BLD AUTO: 53 %
PLATELET # BLD AUTO: 224 10E3/UL (ref 150–450)
RBC # BLD AUTO: 4.29 10E6/UL (ref 3.8–5.2)
SPECIMEN EXPIRATION DATE: NORMAL
T PALLIDUM AB SER QL: NONREACTIVE
WBC # BLD AUTO: 6.3 10E3/UL (ref 4–11)

## 2021-11-09 PROCEDURE — 81025 URINE PREGNANCY TEST: CPT | Performed by: PHYSICIAN ASSISTANT

## 2021-11-09 PROCEDURE — 83655 ASSAY OF LEAD: CPT | Mod: 90 | Performed by: PHYSICIAN ASSISTANT

## 2021-11-09 PROCEDURE — 76801 OB US < 14 WKS SINGLE FETUS: CPT

## 2021-11-09 PROCEDURE — 86901 BLOOD TYPING SEROLOGIC RH(D): CPT | Performed by: PHYSICIAN ASSISTANT

## 2021-11-09 PROCEDURE — 87341 HEP B SURFACE AG NEUTRLZJ IA: CPT | Performed by: PHYSICIAN ASSISTANT

## 2021-11-09 PROCEDURE — 90471 IMMUNIZATION ADMIN: CPT | Performed by: PHYSICIAN ASSISTANT

## 2021-11-09 PROCEDURE — 86762 RUBELLA ANTIBODY: CPT | Performed by: PHYSICIAN ASSISTANT

## 2021-11-09 PROCEDURE — 86780 TREPONEMA PALLIDUM: CPT | Performed by: PHYSICIAN ASSISTANT

## 2021-11-09 PROCEDURE — 87340 HEPATITIS B SURFACE AG IA: CPT | Performed by: PHYSICIAN ASSISTANT

## 2021-11-09 PROCEDURE — 99207 PR PRENATAL FLOW SHEET: CPT | Performed by: PHYSICIAN ASSISTANT

## 2021-11-09 PROCEDURE — 87389 HIV-1 AG W/HIV-1&-2 AB AG IA: CPT | Performed by: PHYSICIAN ASSISTANT

## 2021-11-09 PROCEDURE — 85025 COMPLETE CBC W/AUTO DIFF WBC: CPT | Performed by: PHYSICIAN ASSISTANT

## 2021-11-09 PROCEDURE — 86850 RBC ANTIBODY SCREEN: CPT | Performed by: PHYSICIAN ASSISTANT

## 2021-11-09 PROCEDURE — 87517 HEPATITIS B DNA QUANT: CPT | Performed by: PHYSICIAN ASSISTANT

## 2021-11-09 PROCEDURE — 87086 URINE CULTURE/COLONY COUNT: CPT | Performed by: PHYSICIAN ASSISTANT

## 2021-11-09 PROCEDURE — 99000 SPECIMEN HANDLING OFFICE-LAB: CPT | Performed by: PHYSICIAN ASSISTANT

## 2021-11-09 PROCEDURE — 36415 COLL VENOUS BLD VENIPUNCTURE: CPT | Performed by: PHYSICIAN ASSISTANT

## 2021-11-09 PROCEDURE — 86900 BLOOD TYPING SEROLOGIC ABO: CPT | Performed by: PHYSICIAN ASSISTANT

## 2021-11-09 PROCEDURE — 86803 HEPATITIS C AB TEST: CPT | Performed by: PHYSICIAN ASSISTANT

## 2021-11-09 PROCEDURE — 99213 OFFICE O/P EST LOW 20 MIN: CPT | Mod: 25 | Performed by: PHYSICIAN ASSISTANT

## 2021-11-09 PROCEDURE — 83036 HEMOGLOBIN GLYCOSYLATED A1C: CPT | Performed by: PHYSICIAN ASSISTANT

## 2021-11-09 PROCEDURE — 90686 IIV4 VACC NO PRSV 0.5 ML IM: CPT | Performed by: PHYSICIAN ASSISTANT

## 2021-11-09 PROCEDURE — 84702 CHORIONIC GONADOTROPIN TEST: CPT | Performed by: PHYSICIAN ASSISTANT

## 2021-11-09 PROCEDURE — 84460 ALANINE AMINO (ALT) (SGPT): CPT | Performed by: PHYSICIAN ASSISTANT

## 2021-11-09 RX ORDER — PNV NO.95/FERROUS FUM/FOLIC AC 28MG-0.8MG
1 TABLET ORAL DAILY
Qty: 100 CAPSULE | Refills: 3 | Status: SHIPPED | OUTPATIENT
Start: 2021-11-09 | End: 2022-11-08

## 2021-11-09 NOTE — PATIENT INSTRUCTIONS
Pregnancy: 8 weeks    Due Date: June 19, 2022    Next visit in 6 weeks  With Dr. Linares  For Your First OB Visit        Someone should be calling you within 2-3 days to schedule your appointment.  Let us know if you haven't heardfrom anyone in 1 week.    If you would like to schedule your ultrasound yourself, call #[phone].

## 2021-11-09 NOTE — LETTER
11/09/21          To Whom It May Concern:      Ifeanyi Irizarry (2000) is pregnant.  Estimated Date of Delivery: Jun 19, 2022        Sincerely,    Kimber Penaloza PA-C

## 2021-11-10 LAB
BACTERIA UR CULT: NORMAL
HCV AB SERPL QL IA: NEGATIVE
RUBV IGG SERPL QL IA: 8.92 INDEX
RUBV IGG SERPL QL IA: POSITIVE

## 2021-11-11 DIAGNOSIS — Z3A.08 8 WEEKS GESTATION OF PREGNANCY: Primary | ICD-10-CM

## 2021-11-11 LAB — B-HCG SERPL-ACNC: ABNORMAL IU/L (ref 0–5)

## 2021-11-11 NOTE — PROGRESS NOTES
ASSESSMENT and PLAN:  1. Pregnancy Intake Appointment  Ifeanyi Irizarry is 21 year old and .  Patient's last menstrual period was 2021.  Estimated Date of Delivery: 2022  She will be seeing Dr. Linares for OB care at [facility].  Screening pregnancy lab work was drawn.  Prenatal vitamin prescribed.  Problem list and current medications reviewed and updated.  Dating ultrasound ordered.  Potential exposures/risks discussed: work environment, raw meat/seafood/deli meat, home construction, cats, caffeine.  First trimester genetic screening test was discussed with patient.    She is undecided regarding Nuchal translucency ultrasound.  She will let us know if she wants this done.    Follow up in 6 weeks.    2. Hepatitis B, chronic (H)  Screening labs pending.  - ALT; Future  - Hep B Virus DNA Quant Real Time PCR; Future  - ALT  - Hep B Virus DNA Quant Real Time PCR        HPI:  Ifeanyi Irizarry is a 21 year old female here for pregnancy intake.  She is .  Patient's last menstrual period was 2021.  Positive home pregnancy test 2 weeks ago.    Past Medical History:   Diagnosis Date     Anemia affecting pregnancy 2017     Anemia during pregnancy in third trimester      Chlamydia infection 2016     Hepatitis B complicating pregnancy in third trimester      History of fetal demise, not currently pregnant 2016    missed AB 18 weeks gestation      (normal spontaneous vaginal delivery)      Postpartum hemorrhage 2019     Short cervix during pregnancy in third trimester 2017     Short femur of fetus on prenatal ultrasound 8/3/2017     Supervision of other normal pregnancy, antepartum 11/10/2019        History reviewed. No pertinent surgical history.     Current Outpatient Medications   Medication     Prenatal MV-Min-Fe Fum-FA-DHA (PRENATAL MULTIVITAMIN PLUS DHA) 27-0.8-250 MG CAPS     No current facility-administered medications for this visit.        Social History      Socioeconomic History     Marital status: Single     Spouse name: Not on file     Number of children: Not on file     Years of education: Not on file     Highest education level: Not on file   Occupational History     Not on file   Tobacco Use     Smoking status: Never Smoker     Smokeless tobacco: Never Used     Tobacco comment: no second hand smoke   Substance and Sexual Activity     Alcohol use: No     Drug use: No     Sexual activity: Yes     Partners: Male     Birth control/protection: None   Other Topics Concern     Not on file   Social History Narrative     Not on file     Social Determinants of Health     Financial Resource Strain: Not on file   Food Insecurity: Not on file   Transportation Needs: Not on file   Physical Activity: Not on file   Stress: Not on file   Social Connections: Not on file   Intimate Partner Violence: Not on file   Housing Stability: Not on file          OBJECTIVE:  No Known Allergies  BP (!) 88/58 (BP Location: Left arm, Patient Position: Sitting, Cuff Size: Adult Regular)   Pulse 86   Temp 98.2  F (36.8  C) (Temporal)   Resp 16   Wt 55.3 kg (122 lb)   LMP 09/12/2021   BMI 22.68 kg/m     Body mass index is 22.68 kg/m .     Vital signs stable as recorded above.  General: Patient is alert and oriented x 3, in no apparent distress  Remainder of physical exam deferred to patient's First OB Visit.    Office Visit on 11/09/2021   Component Date Value Ref Range Status     hCG Urine Qualitative 11/09/2021 Positive* Negative Final     Culture 11/09/2021 <10,000 CFU/mL Mixture of urogenital casey   Final     HIV Antigen Antibody Combo 11/09/2021 Negative  Negative Final     Rubella Anna IgG Instrument Value 11/09/2021 8.92* <0.90 Index Final     Rubella Antibody IgG 11/09/2021 Positive* Negative Final     Treponema Antibody Total 11/09/2021 Nonreactive  Nonreactive Final     Hemoglobin A1C 11/09/2021 5.3  0.0 - 5.6 % Final     Hepatitis C Antibody 11/09/2021 Negative  Negative Final      ALT 11/09/2021 14  0 - 45 U/L Final     ABO/RH(D) 11/09/2021 B POS   Final     Antibody Screen 11/09/2021 Negative  Negative Final     SPECIMEN EXPIRATION DATE 11/09/2021 [id number]   Final     WBC Count 11/09/2021 6.3  4.0 - 11.0 10e3/uL Final     RBC Count 11/09/2021 4.29  3.80 - 5.20 10e6/uL Final     Hemoglobin 11/09/2021 12.0  11.7 - 15.7 g/dL Final     Hematocrit 11/09/2021 36.5  35.0 - 47.0 % Final     MCV 11/09/2021 85  78 - 100 fL Final     MCH 11/09/2021 28.0  26.5 - 33.0 pg Final     MCHC 11/09/2021 32.9  31.5 - 36.5 g/dL Final     RDW 11/09/2021 12.6  10.0 - 15.0 % Final     Platelet Count 11/09/2021 224  150 - 450 10e3/uL Final     % Neutrophils 11/09/2021 53  % Final     % Lymphocytes 11/09/2021 35  % Final     % Monocytes 11/09/2021 9  % Final     % Eosinophils 11/09/2021 2  % Final     % Basophils 11/09/2021 1  % Final     % Immature Granulocytes 11/09/2021 0  % Final     Absolute Neutrophils 11/09/2021 3.3  1.6 - 8.3 10e3/uL Final     Absolute Lymphocytes 11/09/2021 2.2  0.8 - 5.3 10e3/uL Final     Absolute Monocytes 11/09/2021 0.6  0.0 - 1.3 10e3/uL Final     Absolute Eosinophils 11/09/2021 0.1  0.0 - 0.7 10e3/uL Final     Absolute Basophils 11/09/2021 0.1  0.0 - 0.2 10e3/uL Final     Absolute Immature Granulocytes 11/09/2021 0.0  <=0.0 10e3/uL Final        Other screening pregnancy lab work is pending.      Please see OB Episode for complete details.    This dictation uses voice recognition software, which may contain typographical errors.

## 2021-11-12 LAB
HBV DNA SERPL NAA+PROBE-ACNC: NOT DETECTED IU/ML
HBV SURFACE AG SERPL QL IA: REACTIVE
LEAD BLDV-MCNC: <2 UG/DL

## 2021-11-19 ENCOUNTER — APPOINTMENT (OUTPATIENT)
Dept: ULTRASOUND IMAGING | Facility: HOSPITAL | Age: 21
End: 2021-11-19
Payer: COMMERCIAL

## 2021-11-19 ENCOUNTER — HOSPITAL ENCOUNTER (EMERGENCY)
Facility: HOSPITAL | Age: 21
Discharge: HOME | End: 2021-11-20
Admitting: PHYSICIAN ASSISTANT
Payer: COMMERCIAL

## 2021-11-19 DIAGNOSIS — O03.9 SPONTANEOUS MISCARRIAGE: ICD-10-CM

## 2021-11-19 LAB
ABO/RH(D): NORMAL
ANTIBODY SCREEN: NEGATIVE
ERYTHROCYTE [DISTWIDTH] IN BLOOD BY AUTOMATED COUNT: 12.5 % (ref 10–15)
HCG SERPL-ACNC: 5904 MLU/ML (ref 0–4)
HCT VFR BLD AUTO: 40.8 % (ref 35–47)
HGB BLD-MCNC: 13.3 G/DL (ref 11.7–15.7)
HOLD SPECIMEN: NORMAL
HOLD SPECIMEN: NORMAL
MCH RBC QN AUTO: 27.9 PG (ref 26.5–33)
MCHC RBC AUTO-ENTMCNC: 32.6 G/DL (ref 31.5–36.5)
MCV RBC AUTO: 86 FL (ref 78–100)
PLATELET # BLD AUTO: 262 10E3/UL (ref 150–450)
RBC # BLD AUTO: 4.77 10E6/UL (ref 3.8–5.2)
SPECIMEN EXPIRATION DATE: NORMAL
WBC # BLD AUTO: 8.2 10E3/UL (ref 4–11)

## 2021-11-19 PROCEDURE — 36415 COLL VENOUS BLD VENIPUNCTURE: CPT | Performed by: PHYSICIAN ASSISTANT

## 2021-11-19 PROCEDURE — 96375 TX/PRO/DX INJ NEW DRUG ADDON: CPT

## 2021-11-19 PROCEDURE — 96374 THER/PROPH/DIAG INJ IV PUSH: CPT

## 2021-11-19 PROCEDURE — 84702 CHORIONIC GONADOTROPIN TEST: CPT | Performed by: PHYSICIAN ASSISTANT

## 2021-11-19 PROCEDURE — 99285 EMERGENCY DEPT VISIT HI MDM: CPT | Mod: 25

## 2021-11-19 PROCEDURE — 96361 HYDRATE IV INFUSION ADD-ON: CPT

## 2021-11-19 PROCEDURE — 250N000013 HC RX MED GY IP 250 OP 250 PS 637: Performed by: PHYSICIAN ASSISTANT

## 2021-11-19 PROCEDURE — 85027 COMPLETE CBC AUTOMATED: CPT | Performed by: PHYSICIAN ASSISTANT

## 2021-11-19 PROCEDURE — 250N000011 HC RX IP 250 OP 636: Performed by: PHYSICIAN ASSISTANT

## 2021-11-19 PROCEDURE — 88305 TISSUE EXAM BY PATHOLOGIST: CPT | Mod: TC | Performed by: PHYSICIAN ASSISTANT

## 2021-11-19 PROCEDURE — 76801 OB US < 14 WKS SINGLE FETUS: CPT

## 2021-11-19 PROCEDURE — 86900 BLOOD TYPING SEROLOGIC ABO: CPT | Performed by: PHYSICIAN ASSISTANT

## 2021-11-19 PROCEDURE — 258N000003 HC RX IP 258 OP 636: Performed by: PHYSICIAN ASSISTANT

## 2021-11-19 RX ORDER — ACETAMINOPHEN 325 MG/1
975 TABLET ORAL ONCE
Status: COMPLETED | OUTPATIENT
Start: 2021-11-19 | End: 2021-11-19

## 2021-11-19 RX ORDER — ONDANSETRON 2 MG/ML
4 INJECTION INTRAMUSCULAR; INTRAVENOUS ONCE
Status: COMPLETED | OUTPATIENT
Start: 2021-11-19 | End: 2021-11-19

## 2021-11-19 RX ADMIN — SODIUM CHLORIDE 1000 ML: 9 INJECTION, SOLUTION INTRAVENOUS at 22:23

## 2021-11-19 RX ADMIN — ONDANSETRON 4 MG: 2 INJECTION INTRAMUSCULAR; INTRAVENOUS at 22:14

## 2021-11-19 RX ADMIN — ACETAMINOPHEN 975 MG: 325 TABLET ORAL at 22:14

## 2021-11-19 RX ADMIN — SODIUM CHLORIDE 1000 ML: 9 INJECTION, SOLUTION INTRAVENOUS at 23:31

## 2021-11-19 ASSESSMENT — ENCOUNTER SYMPTOMS
VOMITING: 0
CHILLS: 0
DIAPHORESIS: 0
FEVER: 0
DIZZINESS: 0
LIGHT-HEADEDNESS: 0
DYSURIA: 0
ABDOMINAL PAIN: 1
NAUSEA: 0
SHORTNESS OF BREATH: 0
FLANK PAIN: 0

## 2021-11-19 ASSESSMENT — MIFFLIN-ST. JEOR: SCORE: 1246.7

## 2021-11-20 VITALS
HEART RATE: 81 BPM | WEIGHT: 120 LBS | HEIGHT: 61 IN | SYSTOLIC BLOOD PRESSURE: 86 MMHG | TEMPERATURE: 98.3 F | RESPIRATION RATE: 16 BRPM | OXYGEN SATURATION: 100 % | DIASTOLIC BLOOD PRESSURE: 54 MMHG | BODY MASS INDEX: 22.66 KG/M2

## 2021-11-20 NOTE — ED PROVIDER NOTES
Emergency Department Encounter   NAME: Ifeanyi Irizarry ; AGE: 21 year old female ; YOB: 2000 ; MRN: [id number] ; PCP: Kimber Penaloza   ED PROVIDER: Destiney Hare PA-C    Evaluation Date & Time:   2021  7:51 PM    CHIEF COMPLAINT:  Vaginal Bleeding      Impression and Plan   MDM: Ifeanyi Irizarry is a  21 year old female with a pertinent history of anemia, chlamydia, spontaneous miscarriage, who presents to the ED by self for evaluation of vaginal bleeding in pregnancy.     The patient is currently 8 weeks gestation by LMP, and is  with a previous miscarriage. She has established OB care with Dr. Steel, and had a confirmatory US. She developed lower abdominal and vaginal bleeding 2 hours PTA. When she was ambulating to the restroom in the ED, she had a large rush of blood and clots and placed a large piece of tissue consistent in shape with an 8 week gestation. Tissue sent to pathology. Patient vitally stable and otherwise well appearing - no evidence of hypovolemia. Plan for pelvic exam, basic labs, and US.     Performed pelvic - large amount of clot and blood in vaginal vault. Clot removed - no further tissue. No brisk or heavy bleeding. Hemoglobin within normal limits. Patient is Rh+ - no indication for rhogam. HCG beta quant was 12,575 on  (~10 days ago), and now Unfortunately has now dropped to 5,904 consistent with active miscarriage. US on  showed A single irregular gestational sac, however no evidence of yolk sac or fetal pole. US today shows that previously seen irregular intrauterine gestational sac is no longer present consistent with failed intrauterine pregnancy.  Endometrium is heterogeneous and thickened with some complex material in the lower uterine segment consistent with blood products within the endometrium. Patient did have a lower blood pressure reading at 88/56, however she reports significantly reduced vaginal bleeding and is feeling improved.  Will order  another liter of fluids.  She is thin, young, and otherwise healthy individual, and I suspect her blood pressure is on the lower side at baseline. Her HGB today was 13.3 drawn after her miscarriage and patient has good reserve. If BP improves as expected after fluids, and patient is otherwise well appearing, feeling improved, and has decreased bleeding, she is appropriate for discharge to home. We discussed the importance of contacting her OB group tomorrow to schedule close follow-up and we reviewed the possible need for D&C. I discussed concerning signs and symptoms to return to the emergency department with her and her significant other and she will continue to monitor her bleeding, watch for signs of infection, and return to the ED with any dizziness or near syncope at home.    ED COURSE:  8:00 PM I met and introduced myself to the patient. I gathered initial history and performed my physical exam. We discussed plan for initial workup.   9:10 PM Performed pelvic examination with ED tech chaperone. Patient will now go to US.   9:30 PM When patient stood up for US, she became dizzy and nauseated. IVF and zofran ordered.   11:02 PM Attempted to recheck patient - she is at US.   11:36 PM Rechecked the patient - she is feeling improved. We discussed US findings and plan for another liter of fluids.   11:48 PM We discussed discharge, follow up and reasons to return to the ED.     At the conclusion of the encounter I discussed the results of all the tests and the disposition. The questions were answered. The patient or family acknowledged understanding and was agreeable with the care plan.    FINAL IMPRESSION:    ICD-10-CM    1. Spontaneous miscarriage  O03.9          MEDICATIONS GIVEN IN THE EMERGENCY DEPARTMENT:  Medications   acetaminophen (TYLENOL) tablet 975 mg (975 mg Oral Given 11/19/21 2214)   0.9% sodium chloride BOLUS (0 mLs Intravenous Stopped 11/19/21 2131)   ondansetron (ZOFRAN) injection 4 mg (4 mg  Intravenous Given 21 9969)   0.9% sodium chloride BOLUS (1,000 mLs Intravenous New Bag 21 2841)         NEW PRESCRIPTIONS STARTED AT TODAY'S ED VISIT:  New Prescriptions    No medications on file         HPI   Patient information was obtained from: Patient    Use of Intrepreter:     Ifeanyi Irizarry is a  21 year old female with a pertinent history of anemia, chlamydia, spontaneous miscarriage, who presents to the ED by self for evaluation of vaginal bleeding in pregnancy.     The patient presents to the emergency department for evaluation of vaginal bleeding that started 2 hours ago.  Patient is currently around 8 weeks gestation by last menstrual period and had her pregnancy confirmed in clinic.  Her OB is Dr. Steel.  Around 2 hours ago, she started experiencing abdominal cramping and heavy vaginal bleeding, prompting her visit to the ED.  She did pass a large amount of blood, clots, and tissue in the ED bathroom upon arrival.  She denies any dizziness or lightheadedness.  Her abdominal cramping is improving.  She has had a previous spontaneous miscarriage.    REVIEW OF SYSTEMS:  Review of Systems   Constitutional: Negative for chills, diaphoresis and fever.   Respiratory: Negative for shortness of breath.    Cardiovascular: Negative for chest pain.   Gastrointestinal: Positive for abdominal pain (cramping). Negative for nausea and vomiting.   Genitourinary: Positive for vaginal bleeding. Negative for dysuria and flank pain.   Neurological: Negative for dizziness and light-headedness.   All other systems reviewed and are negative.        Medical History     Past Medical History:   Diagnosis Date     Anemia affecting pregnancy 2017     Anemia during pregnancy in third trimester      Chlamydia infection 2016     Hepatitis B complicating pregnancy in third trimester      History of fetal demise, not currently pregnant 2016      (normal spontaneous vaginal delivery)      Postpartum hemorrhage  "4/13/2019     Short cervix during pregnancy in third trimester 5/4/2017     Short femur of fetus on prenatal ultrasound 8/3/2017     Supervision of other normal pregnancy, antepartum 11/10/2019       No past surgical history on file.    Family History   Problem Relation Age of Onset     No Known Problems Mother      Hypertension Father      No Known Problems Sister      No Known Problems Brother      No Known Problems Brother      No Known Problems Brother      No Known Problems Brother        Social History     Tobacco Use     Smoking status: Never Smoker     Smokeless tobacco: Never Used     Tobacco comment: no second hand smoke   Substance Use Topics     Alcohol use: No     Drug use: No       Prenatal MV-Min-Fe Fum-FA-DHA (PRENATAL MULTIVITAMIN PLUS DHA) 27-0.8-250 MG CAPS          Physical Exam     First Vitals:  Patient Vitals for the past 24 hrs:   BP Temp Temp src Pulse Resp SpO2 Height Weight   11/19/21 2330 (!) 88/56 -- -- 86 -- 100 % -- --   11/19/21 2325 90/53 -- -- 77 -- 100 % -- --   11/19/21 2230 99/68 -- -- 80 -- 100 % -- --   11/2000 107/75 -- -- 92 -- 100 % -- --   11/19/21 1956 107/75 98.3  F (36.8  C) Oral 87 16 100 % 1.549 m (5' 1\") 54.4 kg (120 lb)         PHYSICAL EXAM:   Physical Exam  Vitals and nursing note reviewed.   Constitutional:       General: She is not in acute distress.     Appearance: Normal appearance. She is not ill-appearing, toxic-appearing or diaphoretic.   Eyes:      Conjunctiva/sclera: Conjunctivae normal.   Cardiovascular:      Rate and Rhythm: Normal rate and regular rhythm.   Abdominal:      General: Abdomen is flat. There is no distension.      Palpations: Abdomen is soft.      Tenderness: There is no abdominal tenderness. There is no guarding or rebound.   Genitourinary:     Comments: Large amount of blood and clots in vaginal vault. No further tissue. Cervix difficult to fully visualize due to blood. No CMT.   Skin:     General: Skin is warm and dry.      " Coloration: Skin is not pale.   Neurological:      Mental Status: She is alert.         Results     LAB:  All pertinent labs reviewed and interpreted  Labs Ordered and Resulted from Time of ED Arrival to Time of ED Departure   HCG QUANTITATIVE PREGNANCY - Abnormal       Result Value    hCG Quantitative 5,904 (*)    CBC WITH PLATELETS - Normal    WBC Count 8.2      RBC Count 4.77      Hemoglobin 13.3      Hematocrit 40.8      MCV 86      MCH 27.9      MCHC 32.6      RDW 12.5      Platelet Count 262     TYPE AND SCREEN, ADULT    ABO/RH(D) B POS      Antibody Screen Negative      SPECIMEN EXPIRATION DATE [id number]     SURGICAL PATHOLOGY EXAM   ABO/RH TYPE AND SCREEN       RADIOLOGY:  OB  US 1st trimester w transvag   Preliminary Result   IMPRESSION:    1.  Previously seen irregular intrauterine gestational sac is no longer present. Findings compatible with a failed intrauterine pregnancy.      2.  Endometrium is heterogeneous and thickened with some complex material in the lower uterine segment probably representing blood products within the endometrium. Retained products of conception cannot be completely excluded. Clinical correlation and    follow-up as clinically warranted.      3.  Ovaries and adnexa are negative. No adnexal masses or complex cysts.        Destiney Hare PA-C   Emergency Medicine   [facility] EMERGENCY DEPARTMENT     Destiney Hare PA-C  11/19/21 9508       Destiney Hare PA-C  11/19/21 1981

## 2021-11-20 NOTE — ED TRIAGE NOTES
Pt presents to ED with c/o vaginal bleeding, pt started bleeding approx 2 hours ago, this is pregnancy number 5 with 3 living and 1 prior miscarriage, cramping started with heavy bleeding d5vazhr ago, pt denies any lightheadedness or dizziness, pt was taken to the bathroom where a large clot came out, pt brought to room 4

## 2021-11-20 NOTE — ED NOTES
Pt's BP noted to be in the 80's, MD aware, pt is asymptomatic, denies dizziness, lightheadedness, no nausea, pt states that she feels fine to go home, MD in agreement.

## 2021-11-20 NOTE — DISCHARGE INSTRUCTIONS
As we discussed, your ultrasound, blood work, and exam are showing that you are undergoing an active miscarriage.  You will continue to have bleeding over the next several days.  Please call your OB clinic first thing tomorrow to schedule close follow-up.  They will likely want to see you in clinic next week to repeat an ultrasound to make sure all of the tissue has cleared.  If it anytime you develop fever, worsening abdominal pain, heavy vaginal bleeding going through more than 2 pads per hour, dizziness or near loss of consciousness, or any new or concerning symptoms please return to the ER for further evaluation.

## 2021-11-23 ENCOUNTER — TELEPHONE (OUTPATIENT)
Dept: FAMILY MEDICINE | Facility: CLINIC | Age: 21
End: 2021-11-23

## 2021-11-23 PROCEDURE — 88305 TISSUE EXAM BY PATHOLOGIST: CPT | Mod: 26 | Performed by: PATHOLOGY

## 2021-11-23 NOTE — TELEPHONE ENCOUNTER
Patient has a future Telephone Visit appointment on 11/26/21 with HUNTER Hyatt. Completing task.

## 2021-11-23 NOTE — TELEPHONE ENCOUNTER
Please call pt and have her schedule a virtual visit with me or Dr. Linares this week or next week for follow-up miscarriage.  Ok to use same day or reseserved spot if needed.

## 2021-11-23 NOTE — TELEPHONE ENCOUNTER
"Called and left message #1.Please assist with scheduling virtual visit upon return call  \" Okay to relay message\"    "

## 2021-11-26 ENCOUNTER — VIRTUAL VISIT (OUTPATIENT)
Dept: FAMILY MEDICINE | Facility: CLINIC | Age: 21
End: 2021-11-26
Payer: COMMERCIAL

## 2021-11-26 DIAGNOSIS — O03.9 MISCARRIAGE: Primary | ICD-10-CM

## 2021-11-26 PROCEDURE — 99213 OFFICE O/P EST LOW 20 MIN: CPT | Mod: 95 | Performed by: PHYSICIAN ASSISTANT

## 2021-11-26 NOTE — PROGRESS NOTES
Ifeanyi is a 21 year old who is being evaluated via a billable telephone visit.      What phone number would you like to be contacted at? [phone]  How would you like to obtain your AVS? Yasmeen    Assessment & Plan     1.  Follow-up miscarriage  Miscarriage diagnosed at the ER on 11/19/2021, approximately 6 weeks gestation.  She is still having a small amount of bleeding, no pain.  Blood type B+, normal hemoglobin at the ER.  She feels like she is overall doing okay.  Pregnancy loss counseling was offered.  She will let us know if she wants to do this.  We reviewed considering birth control options.  She will let us know if she would like to use anything for birth control.  Follow-up ultrasound ordered to be done in the next 1 to 2 weeks.  I discussed with patient it would be important to do this, just to be reassured that the possible retained products of conception seen on the ultrasound last week have resolved.  If she has any problems before then, she will contact us.  All of her questions were answered.  Does have first OB scheduled with Dr. Linares on 12/21/2021.  If follow-up ultrasound is normal and she is feeling fine, we could cancel this appointment, otherwise could switch it to follow-up miscarriage.  We will discuss this with patient will talk to her about ultrasound results.  - US OB < 14 Weeks Single; Future      Subjective   Ifeanyi is a 21 year old who presents for the following health issues     HPI     I saw patient for pregnancy intake on 11/9/2021, no concerns.  She had an ultrasound on that same date which showed single IUP, irregular gestational sac with no yolk sac or fetal pole, approximately 6 weeks 1 day.  I discussed results with patient.  She is not having any symptoms.  Discussed it is possible this could result in a miscarriage.  Plan was to get a follow-up ultrasound in 2 weeks.  On 11/19/2021, she started having vaginal bleeding and went to the ER.  I reviewed the ER note  today.  Ultrasound done at the ER showed no gestational sac, consistent with miscarriage.  Also showed possible products retained products of conception.  She is blood type B+.  Hemoglobin 13.3 at the ER.  I reviewed those lab results today.  I also reviewed follow-up ultrasound report.  Stated pathology on the tissue at the ER, in reviewing chart I think this is still pending.  Beta-hCG quantitative was 5, 904.    Now:  Small amount of bleeding   No pain          Objective           Vitals:  No vitals were obtained today due to virtual visit.    Physical Exam   healthy, alert and no distress  PSYCH: Alert and oriented times 3; coherent speech, normal   rate and volume, able to articulate logical thoughts, able   to abstract reason, no tangential thoughts, no hallucinations   or delusions  Her affect is somewhat flat  RESP: No cough, no audible wheezing, able to talk in full sentences  Remainder of exam unable to be completed due to telephone visits          Phone call duration: 9 minutes  9:44  9:53

## 2021-12-11 ENCOUNTER — HEALTH MAINTENANCE LETTER (OUTPATIENT)
Age: 21
End: 2021-12-11

## 2021-12-14 LAB
PATH REPORT.ADDENDUM SPEC: NORMAL
PATH REPORT.COMMENTS IMP SPEC: NORMAL
PATH REPORT.FINAL DX SPEC: NORMAL
PATH REPORT.GROSS SPEC: NORMAL
PATH REPORT.MICROSCOPIC SPEC OTHER STN: NORMAL
PATH REPORT.RELEVANT HX SPEC: NORMAL
PHOTO IMAGE: NORMAL

## 2021-12-27 ENCOUNTER — MYC MEDICAL ADVICE (OUTPATIENT)
Dept: FAMILY MEDICINE | Facility: CLINIC | Age: 21
End: 2021-12-27
Payer: COMMERCIAL

## 2021-12-27 DIAGNOSIS — O03.4 RETAINED PRODUCTS OF CONCEPTION AFTER MISCARRIAGE: Primary | ICD-10-CM

## 2022-02-10 ENCOUNTER — TELEPHONE (OUTPATIENT)
Dept: FAMILY MEDICINE | Facility: CLINIC | Age: 22
End: 2022-02-10

## 2022-02-10 NOTE — TELEPHONE ENCOUNTER
Reason for Call:  Other     Detailed comments: They called pt.for education for hep B she stated she miscarried and is not pregnant at this time  Phone Number Patient can be reached at: Other phone number:  [phone]    Best Time: anytime    Can we leave a detailed message on this number? YES    Call taken on 2/10/2022 at 4:33 PM by Chantel Alonzo

## 2022-02-11 NOTE — TELEPHONE ENCOUNTER
RN [facility] , but no answer. Left message on Elba's voice mail to call clinic back.    Need more detailed information    Giovana Denson RN  [facility]

## 2022-02-14 NOTE — TELEPHONE ENCOUNTER
Elba called back wanting  To confirm that pt had miscarried I read her the dictation from 11/23/21 she will close request for the hep clinic

## 2022-10-01 ENCOUNTER — HEALTH MAINTENANCE LETTER (OUTPATIENT)
Age: 22
End: 2022-10-01

## 2022-10-21 ENCOUNTER — TELEPHONE (OUTPATIENT)
Dept: FAMILY MEDICINE | Facility: CLINIC | Age: 22
End: 2022-10-21

## 2022-10-21 NOTE — TELEPHONE ENCOUNTER
Left a message for patient to call [facility]clinic back to schedule appt. If patient calls back, please transfer to TC to assist in scheduling appt. Thanks.

## 2022-10-21 NOTE — TELEPHONE ENCOUNTER
Reason for Call:  Other appointment and call back    Detailed comments: Patient called wanting to schedule a prenatal appointment    Phone Number Patient can be reached at: Other phone number:  [phone]    Best Time: anytime    Can we leave a detailed message on this number? YES    Call taken on 10/21/2022 at 8:25 AM by Soraya Ritchie

## 2022-10-24 NOTE — TELEPHONE ENCOUNTER
Attempt # 2- LM on [phone], and unable to leave message on other number [phone] as VM box not set up. If patient calls back, please transfer to TC to assist in scheduling appt. Postponing out to tomorrow and will try again.

## 2022-10-24 NOTE — TELEPHONE ENCOUNTER
Patient called back. Appt scheduled for 11/08/2022 @ 10:40am for pregnancy confirmation. Completing task.

## 2022-11-07 LAB
ABO/RH(D): NORMAL
ANTIBODY SCREEN: NEGATIVE
SPECIMEN EXPIRATION DATE: NORMAL

## 2022-11-08 ENCOUNTER — PRENATAL OFFICE VISIT (OUTPATIENT)
Dept: FAMILY MEDICINE | Facility: CLINIC | Age: 22
End: 2022-11-08
Payer: COMMERCIAL

## 2022-11-08 ENCOUNTER — HOSPITAL ENCOUNTER (OUTPATIENT)
Dept: ULTRASOUND IMAGING | Facility: HOSPITAL | Age: 22
Discharge: HOME | End: 2022-11-08
Attending: PHYSICIAN ASSISTANT | Admitting: PHYSICIAN ASSISTANT
Payer: COMMERCIAL

## 2022-11-08 VITALS
HEART RATE: 112 BPM | BODY MASS INDEX: 21.81 KG/M2 | HEIGHT: 62 IN | DIASTOLIC BLOOD PRESSURE: 66 MMHG | TEMPERATURE: 99.3 F | SYSTOLIC BLOOD PRESSURE: 96 MMHG | RESPIRATION RATE: 20 BRPM | WEIGHT: 118.5 LBS

## 2022-11-08 DIAGNOSIS — Z3A.01 LESS THAN 8 WEEKS GESTATION OF PREGNANCY: ICD-10-CM

## 2022-11-08 DIAGNOSIS — B18.1 HEPATITIS B, CHRONIC (H): ICD-10-CM

## 2022-11-08 DIAGNOSIS — Z3A.01 LESS THAN 8 WEEKS GESTATION OF PREGNANCY: Primary | ICD-10-CM

## 2022-11-08 DIAGNOSIS — N92.6 ABNORMAL MENSES: ICD-10-CM

## 2022-11-08 PROBLEM — Z3A.08 8 WEEKS GESTATION OF PREGNANCY: Status: RESOLVED | Noted: 2021-11-09 | Resolved: 2022-11-08

## 2022-11-08 LAB
ALBUMIN UR-MCNC: NEGATIVE MG/DL
ALT SERPL W P-5'-P-CCNC: 14 U/L (ref 10–35)
BASOPHILS # BLD AUTO: 0 10E3/UL (ref 0–0.2)
BASOPHILS NFR BLD AUTO: 1 %
EOSINOPHIL # BLD AUTO: 0.1 10E3/UL (ref 0–0.7)
EOSINOPHIL NFR BLD AUTO: 1 %
ERYTHROCYTE [DISTWIDTH] IN BLOOD BY AUTOMATED COUNT: 13.1 % (ref 10–15)
GLUCOSE UR STRIP-MCNC: NEGATIVE MG/DL
HBA1C MFR BLD: 5 % (ref 0–5.6)
HCG UR QL: POSITIVE
HCT VFR BLD AUTO: 37.2 % (ref 35–47)
HCV AB SERPL QL IA: NONREACTIVE
HGB BLD-MCNC: 12.4 G/DL (ref 11.7–15.7)
HIV 1+2 AB+HIV1 P24 AG SERPL QL IA: NONREACTIVE
IMM GRANULOCYTES # BLD: 0 10E3/UL
IMM GRANULOCYTES NFR BLD: 0 %
KETONES UR STRIP-MCNC: NEGATIVE MG/DL
LYMPHOCYTES # BLD AUTO: 2 10E3/UL (ref 0.8–5.3)
LYMPHOCYTES NFR BLD AUTO: 24 %
MCH RBC QN AUTO: 27.6 PG (ref 26.5–33)
MCHC RBC AUTO-ENTMCNC: 33.3 G/DL (ref 31.5–36.5)
MCV RBC AUTO: 83 FL (ref 78–100)
MONOCYTES # BLD AUTO: 0.7 10E3/UL (ref 0–1.3)
MONOCYTES NFR BLD AUTO: 8 %
NEUTROPHILS # BLD AUTO: 5.3 10E3/UL (ref 1.6–8.3)
NEUTROPHILS NFR BLD AUTO: 66 %
PLATELET # BLD AUTO: 269 10E3/UL (ref 150–450)
RBC # BLD AUTO: 4.49 10E6/UL (ref 3.8–5.2)
T PALLIDUM AB SER QL: NONREACTIVE
WBC # BLD AUTO: 8.1 10E3/UL (ref 4–11)

## 2022-11-08 PROCEDURE — 83655 ASSAY OF LEAD: CPT | Mod: 90 | Performed by: PHYSICIAN ASSISTANT

## 2022-11-08 PROCEDURE — 87341 HEP B SURFACE AG NEUTRLZJ IA: CPT | Performed by: PHYSICIAN ASSISTANT

## 2022-11-08 PROCEDURE — 99207 PR PRENATAL FLOW SHEET: CPT | Performed by: PHYSICIAN ASSISTANT

## 2022-11-08 PROCEDURE — 86901 BLOOD TYPING SEROLOGIC RH(D): CPT | Performed by: PHYSICIAN ASSISTANT

## 2022-11-08 PROCEDURE — 87491 CHLMYD TRACH DNA AMP PROBE: CPT | Performed by: PHYSICIAN ASSISTANT

## 2022-11-08 PROCEDURE — 87086 URINE CULTURE/COLONY COUNT: CPT | Performed by: PHYSICIAN ASSISTANT

## 2022-11-08 PROCEDURE — 86762 RUBELLA ANTIBODY: CPT | Performed by: PHYSICIAN ASSISTANT

## 2022-11-08 PROCEDURE — 90686 IIV4 VACC NO PRSV 0.5 ML IM: CPT | Performed by: PHYSICIAN ASSISTANT

## 2022-11-08 PROCEDURE — 85025 COMPLETE CBC W/AUTO DIFF WBC: CPT | Performed by: PHYSICIAN ASSISTANT

## 2022-11-08 PROCEDURE — 86803 HEPATITIS C AB TEST: CPT | Performed by: PHYSICIAN ASSISTANT

## 2022-11-08 PROCEDURE — 36415 COLL VENOUS BLD VENIPUNCTURE: CPT | Performed by: PHYSICIAN ASSISTANT

## 2022-11-08 PROCEDURE — 99000 SPECIMEN HANDLING OFFICE-LAB: CPT | Performed by: PHYSICIAN ASSISTANT

## 2022-11-08 PROCEDURE — 86900 BLOOD TYPING SEROLOGIC ABO: CPT | Performed by: PHYSICIAN ASSISTANT

## 2022-11-08 PROCEDURE — 87517 HEPATITIS B DNA QUANT: CPT | Performed by: PHYSICIAN ASSISTANT

## 2022-11-08 PROCEDURE — 90471 IMMUNIZATION ADMIN: CPT | Performed by: PHYSICIAN ASSISTANT

## 2022-11-08 PROCEDURE — 76801 OB US < 14 WKS SINGLE FETUS: CPT

## 2022-11-08 PROCEDURE — 87389 HIV-1 AG W/HIV-1&-2 AB AG IA: CPT | Performed by: PHYSICIAN ASSISTANT

## 2022-11-08 PROCEDURE — 87088 URINE BACTERIA CULTURE: CPT | Performed by: PHYSICIAN ASSISTANT

## 2022-11-08 PROCEDURE — 81025 URINE PREGNANCY TEST: CPT | Performed by: PHYSICIAN ASSISTANT

## 2022-11-08 PROCEDURE — 87340 HEPATITIS B SURFACE AG IA: CPT | Performed by: PHYSICIAN ASSISTANT

## 2022-11-08 PROCEDURE — 86780 TREPONEMA PALLIDUM: CPT | Performed by: PHYSICIAN ASSISTANT

## 2022-11-08 PROCEDURE — 84460 ALANINE AMINO (ALT) (SGPT): CPT | Performed by: PHYSICIAN ASSISTANT

## 2022-11-08 PROCEDURE — 86850 RBC ANTIBODY SCREEN: CPT | Performed by: PHYSICIAN ASSISTANT

## 2022-11-08 PROCEDURE — 99213 OFFICE O/P EST LOW 20 MIN: CPT | Mod: 25 | Performed by: PHYSICIAN ASSISTANT

## 2022-11-08 PROCEDURE — 83036 HEMOGLOBIN GLYCOSYLATED A1C: CPT | Performed by: PHYSICIAN ASSISTANT

## 2022-11-08 PROCEDURE — 81003 URINALYSIS AUTO W/O SCOPE: CPT | Performed by: PHYSICIAN ASSISTANT

## 2022-11-08 RX ORDER — PNV NO.95/FERROUS FUM/FOLIC AC 28MG-0.8MG
1 TABLET ORAL DAILY
Qty: 100 CAPSULE | Refills: 3 | Status: ON HOLD | OUTPATIENT
Start: 2022-11-08 | End: 2023-06-22

## 2022-11-08 NOTE — PATIENT INSTRUCTIONS
Pregnancy: 7 weeks    Due Date: June 25, 2023    Next visit in 6 weeks  With Dr. Linares  For Your First OB Visit        Someone should be calling you within 2-3 days to schedule your ultrasound appointment.  If you would like to schedule your ultrasound yourself, call #[phone].

## 2022-11-08 NOTE — PROGRESS NOTES
ASSESSMENT and PLAN:  1. Pregnancy Intake Appointment  Ifeanyi Irizarry is 22 year old and .  Patient's last menstrual period was 2022 (exact date).  Estimated Date of Delivery: 2023  She will be seeing Dr. Linares for OB care at [facility].  Screening pregnancy lab work was drawn.  Prenatal vitamin prescribed.  Problem list and current medications reviewed and updated.  Dating ultrasound ordered.  Potential exposures/risks discussed: work environment, raw meat/seafood/deli meat, home construction, cats, caffeine.  First trimester genetic screening test was discussed with patient.    She declines Nuchal translucency ultrasound.    Follow up in 6 weeks.    2. Hepatitis B, chronic (H)  Screening labs ordered today and I will follow-up with results.  - ALT; Future  - Hep B Virus DNA Quant Real Time PCR; Future  - ALT  - Hep B Virus DNA Quant Real Time PCR            HPI:  Ifeanyi Irizarry is a 22 year old female here for pregnancy intake.  She is .  Patient's last menstrual period was 2022 (exact date).  Positive home pregnancy test a few weeks ago.  Of note, she did have a positive pregnancy test on 2022, then had a normal period a few days later.  History of miscarriage 2021.      Past Medical History:   Diagnosis Date     Anemia affecting pregnancy 2017     Anemia during pregnancy in third trimester      Chlamydia infection 2016     Hepatitis B complicating pregnancy in third trimester      History of fetal demise, not currently pregnant 2016    missed AB 18 weeks gestation      (normal spontaneous vaginal delivery)      Postpartum hemorrhage 2019     Short cervix during pregnancy in third trimester 2017     Short femur of fetus on prenatal ultrasound 2017     Supervision of other normal pregnancy, antepartum 11/10/2019        History reviewed. No pertinent surgical history.     Current Outpatient Medications   Medication     Prenatal MV-Min-Fe  "Fum-FA-DHA (PRENATAL MULTIVITAMIN PLUS DHA) 27-0.8-250 MG CAPS     No current facility-administered medications for this visit.          OBJECTIVE:  No Known Allergies  BP 96/66 (BP Location: Right arm, Patient Position: Sitting, Cuff Size: Adult Regular)   Pulse 112   Temp 99.3  F (37.4  C) (Oral)   Resp 20   Ht 1.565 m (5' 1.61\")   Wt 53.8 kg (118 lb 8 oz)   LMP 09/18/2022 (Exact Date)   BMI 21.95 kg/m     Body mass index is 21.95 kg/m .     Vital signs stable as recorded above.  General: Patient is alert and oriented x 3, in no apparent distress  Remainder of physical exam deferred to patient's First OB Visit.      Prenatal Office Visit on 11/08/2022   Component Date Value Ref Range Status     hCG Urine Qualitative 11/08/2022 Positive (A)  Negative Final     Glucose Urine 11/08/2022 Negative  Negative, 1000 , >=2000 mg/dL Final     Ketones Urine 11/08/2022 Negative  Negative, 160  mg/dL Final     Protein Albumin Urine 11/08/2022 Negative  Negative, 300 , >=2000 mg/dL Final          Other screening pregnancy lab work is pending.      Please see OB Episode for complete details.    This dictation uses voice recognition software, which may contain typographical errors.      "

## 2022-11-09 LAB
BACTERIA UR CULT: ABNORMAL
C TRACH DNA SPEC QL NAA+PROBE: NEGATIVE
HBV SURFACE AG SERPL QL IA: REACTIVE
RUBV IGG SERPL QL IA: 9.17 INDEX
RUBV IGG SERPL QL IA: POSITIVE

## 2022-11-10 LAB — LEAD BLDV-MCNC: <2 UG/DL

## 2022-11-11 LAB — HBV DNA SERPL NAA+PROBE-ACNC: NOT DETECTED IU/ML

## 2022-11-14 DIAGNOSIS — R82.71 BACTERIURIA IN PREGNANCY: Primary | ICD-10-CM

## 2022-11-14 DIAGNOSIS — O99.891 BACTERIURIA IN PREGNANCY: Primary | ICD-10-CM

## 2022-11-14 RX ORDER — AMOXICILLIN AND CLAVULANATE POTASSIUM 500; 125 MG/1; MG/1
1 TABLET, FILM COATED ORAL 2 TIMES DAILY
Qty: 14 TABLET | Refills: 0 | Status: SHIPPED | OUTPATIENT
Start: 2022-11-14 | End: 2022-11-21

## 2022-12-15 NOTE — TELEPHONE ENCOUNTER
Clinic Care Coordination Contact  [facility]/Voicemail       Clinical Data: Care Coordinator Outreach  Outreach attempted x 1.  Left message on patient's voicemail with call back information and requested return call.  Plan: Care Coordinator sent care coordination introduction letter on 10/28/22 via [employer]. Care Coordinator will try to reach patient again in 10 business days.    Uriel Zhou MSN, RN, PHN, CCM   Primary Care Clinical RN Care Coordinator  [facility]  12/15/2022   9:27 AM  [email or web address]  Office: [phone]       Please help schedule an OB visit with Dr. Linares. Thanks.

## 2022-12-20 ENCOUNTER — PRENATAL OFFICE VISIT (OUTPATIENT)
Dept: FAMILY MEDICINE | Facility: CLINIC | Age: 22
End: 2022-12-20
Payer: COMMERCIAL

## 2022-12-20 VITALS
BODY MASS INDEX: 23.03 KG/M2 | DIASTOLIC BLOOD PRESSURE: 68 MMHG | WEIGHT: 122 LBS | HEIGHT: 61 IN | RESPIRATION RATE: 18 BRPM | TEMPERATURE: 97.8 F | HEART RATE: 90 BPM | SYSTOLIC BLOOD PRESSURE: 99 MMHG | OXYGEN SATURATION: 99 %

## 2022-12-20 DIAGNOSIS — Z12.4 SCREENING FOR CERVICAL CANCER: ICD-10-CM

## 2022-12-20 DIAGNOSIS — Z34.82 ENCOUNTER FOR SUPERVISION OF OTHER NORMAL PREGNANCY IN SECOND TRIMESTER: Primary | ICD-10-CM

## 2022-12-20 PROCEDURE — 99207 PR PRENATAL VISIT: CPT | Performed by: FAMILY MEDICINE

## 2022-12-20 PROCEDURE — 81003 URINALYSIS AUTO W/O SCOPE: CPT | Performed by: FAMILY MEDICINE

## 2022-12-20 PROCEDURE — 36415 COLL VENOUS BLD VENIPUNCTURE: CPT | Performed by: FAMILY MEDICINE

## 2022-12-20 PROCEDURE — G0145 SCR C/V CYTO,THINLAYER,RESCR: HCPCS | Performed by: FAMILY MEDICINE

## 2022-12-20 NOTE — PROGRESS NOTES
"Discussed screening for aneuploidy and neural tube defects.  Patient wished to proceed with cell free DNA screening.  Risk benefits and alternatives discussed.    Carrier screening for SMA, CF, thalassemia, fragile X discussed.  She did not want to proceed with any screening for these conditions.    Reviewed intake exam, labs, DELROY, past medical history, past surgical history, family history, genetic history, and previous obstetrical history.    Preeclampsia risk factors - at increased risk if 1+ high risk or 2+ moderate risk factors    High risk factors (1+):NONE    Moderate risk factors (2+): NONE     Diabetes risk factors - at increased risk if BMI 25+ and 1+ additional risk factors      BMI: Could not be calculated     Additional risk factors (1+): Ethnicity: , , ,  American,      5' 1.417\"    BP 99/68 (BP Location: Left arm, Patient Position: Sitting, Cuff Size: Adult Regular)   Pulse 90   Temp 97.8  F (36.6  C) (Temporal)   Resp 18   Ht 1.56 m (5' 1.42\")   Wt 55.3 kg (122 lb)   LMP 2022 (Exact Date)   SpO2 99%   BMI 22.74 kg/m       Assessment and Plan:  22 year old  at 13w2d   Recent miscarriage.  History of low iron anemia.  2 of her daughters have fairly significant anemia at this time.  Suspect low intake of iron at home.  Advised to continue prenatal vitamin and eat foods with iron.    1. Encounter for supervision of other normal pregnancy in second trimester  - Urinalysis, OB Screen; Future  - Non Invasive Prenatal Test Cell Free DNA; Future  - Non Invasive Prenatal Test Cell Free DNA  - Urinalysis, OB Screen    2. Screening for cervical cancer  - Pap screen only - recommended age 21 - 24 years   "

## 2022-12-26 LAB
BKR LAB AP GYN ADEQUACY: NORMAL
BKR LAB AP GYN INTERPRETATION: NORMAL
BKR LAB AP HPV REFLEX: NO
BKR LAB AP LMP: NORMAL
BKR LAB AP PREVIOUS ABNORMAL: NORMAL
PATH REPORT.COMMENTS IMP SPEC: NORMAL
PATH REPORT.COMMENTS IMP SPEC: NORMAL
PATH REPORT.RELEVANT HX SPEC: NORMAL

## 2022-12-29 LAB — SCANNED LAB RESULT: NORMAL

## 2022-12-30 ENCOUNTER — TELEPHONE (OUTPATIENT)
Dept: FAMILY MEDICINE | Facility: CLINIC | Age: 22
End: 2022-12-30

## 2022-12-30 NOTE — TELEPHONE ENCOUNTER
----- Message from Catrachito Linares MD sent at 12/30/2022 10:04 AM CST -----  Call:  Results for your recent screening tests for your pregnant are:  Normal for chromosomes 21, 18, 13 and sex chromosomes.  If she is interested in knowing the sex of the baby, it is predicted to be a boy.

## 2022-12-30 NOTE — TELEPHONE ENCOUNTER
Attempted to call pt regarding message below, but unable to reach patient. Unable to LVM.        Okay to relay message

## 2023-01-10 ENCOUNTER — PRENATAL OFFICE VISIT (OUTPATIENT)
Dept: FAMILY MEDICINE | Facility: CLINIC | Age: 23
End: 2023-01-10
Payer: COMMERCIAL

## 2023-01-10 VITALS
RESPIRATION RATE: 20 BRPM | OXYGEN SATURATION: 99 % | DIASTOLIC BLOOD PRESSURE: 68 MMHG | HEART RATE: 115 BPM | BODY MASS INDEX: 22.74 KG/M2 | SYSTOLIC BLOOD PRESSURE: 100 MMHG | WEIGHT: 122 LBS | TEMPERATURE: 97.6 F

## 2023-01-10 DIAGNOSIS — B18.1 HEPATITIS B, CHRONIC (H): ICD-10-CM

## 2023-01-10 DIAGNOSIS — Z34.92 ENCOUNTER FOR SUPERVISION OF NORMAL PREGNANCY IN SECOND TRIMESTER, UNSPECIFIED GRAVIDITY: Primary | ICD-10-CM

## 2023-01-10 PROBLEM — Z3A.01 LESS THAN 8 WEEKS GESTATION OF PREGNANCY: Status: RESOLVED | Noted: 2022-11-08 | Resolved: 2023-01-10

## 2023-01-10 PROCEDURE — 81003 URINALYSIS AUTO W/O SCOPE: CPT | Performed by: FAMILY MEDICINE

## 2023-01-10 PROCEDURE — 99207 PR PRENATAL VISIT: CPT | Performed by: FAMILY MEDICINE

## 2023-01-10 NOTE — PROGRESS NOTES
Subjective:  23 year old  at 16w2d  No ctx, lof, bleeding, or dc.  No HA or vision changes.    Outpatient Medications Prior to Visit   Medication Sig Dispense Refill     Prenatal MV-Min-Fe Fum-FA-DHA (PRENATAL MULTIVITAMIN PLUS DHA) 27-0.8-250 MG CAPS Take 1 tablet by mouth daily OK to substitute formulary equivalent (Patient not taking: Reported on 1/10/2023) 100 capsule 3     No facility-administered medications prior to visit.      History   Smoking Status     Never   Smokeless Tobacco     Never      Objective:  /68 (BP Location: Left arm, Patient Position: Sitting, Cuff Size: Adult Regular)   Pulse 115   Temp 97.6  F (36.4  C) (Temporal)   Resp 20   Wt 55.3 kg (122 lb)   LMP 2022 (Exact Date)   SpO2 99%   BMI 22.74 kg/m    GENERAL: alert, not distressed  CHEST: clear, no rales, rhonchi, or wheezes  CARDIAC: regular without murmur, gallop, or rub  ABDOMEN: gravid, soft, non tender, non distended    Recent Results (from the past 24 hour(s))   Urinalysis, OB Screen    Collection Time: 01/10/23  9:35 AM   Result Value Ref Range    Glucose Urine Negative Negative mg/dL    Ketones Urine Negative Negative mg/dL    Protein Albumin Urine Negative Negative mg/dL      Assessment and Plan:   1. Encounter for supervision of normal pregnancy in second trimester  - Urinalysis, OB Screen  Ultrasound ordered for fetal survey at 19 to 20 weeks    2. Hepatitis B, chronic (H)  Plan labs with GCT     Discussed keeping iron levels up.    Return in 1 month (on 2/10/2023) for prenatal care.   Future Appointments   Date Time Provider Department Center   2023  9:00 AM Catrachito Linares MD [facility]   3/7/2023  9:00 AM Catrachito Linares MD [facility]   2023  3:20 PM Catrachito Linares MD [facility]   2023  3:20 PM Catrachito Linares MD [facility]   2023  3:20 PM Catrachito Linares MD [facility]   2023  3:00 PM Catrachito Linares MD [facility]   2023  3:20 PM [facility]   6/13/2023  3:20 PM [facility]   6/20/2023  3:20 PM [facility]   6/27/2023  3:20 PM [facility]

## 2023-02-02 ENCOUNTER — HOSPITAL ENCOUNTER (OUTPATIENT)
Dept: ULTRASOUND IMAGING | Facility: HOSPITAL | Age: 23
Discharge: HOME | End: 2023-02-02
Attending: FAMILY MEDICINE | Admitting: FAMILY MEDICINE
Payer: COMMERCIAL

## 2023-02-02 DIAGNOSIS — Z34.92 ENCOUNTER FOR SUPERVISION OF NORMAL PREGNANCY IN SECOND TRIMESTER, UNSPECIFIED GRAVIDITY: ICD-10-CM

## 2023-02-02 PROCEDURE — 76805 OB US >/= 14 WKS SNGL FETUS: CPT

## 2023-02-05 ENCOUNTER — HEALTH MAINTENANCE LETTER (OUTPATIENT)
Age: 23
End: 2023-02-05

## 2023-02-07 ENCOUNTER — PRENATAL OFFICE VISIT (OUTPATIENT)
Dept: FAMILY MEDICINE | Facility: CLINIC | Age: 23
End: 2023-02-07
Payer: COMMERCIAL

## 2023-02-07 VITALS
OXYGEN SATURATION: 99 % | WEIGHT: 127 LBS | BODY MASS INDEX: 23.67 KG/M2 | DIASTOLIC BLOOD PRESSURE: 62 MMHG | HEART RATE: 110 BPM | SYSTOLIC BLOOD PRESSURE: 98 MMHG | RESPIRATION RATE: 21 BRPM | TEMPERATURE: 97.8 F

## 2023-02-07 DIAGNOSIS — Z34.82 ENCOUNTER FOR SUPERVISION OF OTHER NORMAL PREGNANCY IN SECOND TRIMESTER: Primary | ICD-10-CM

## 2023-02-07 PROCEDURE — 99207 PR PRENATAL VISIT: CPT | Performed by: FAMILY MEDICINE

## 2023-02-08 NOTE — PROGRESS NOTES
Subjective:  23 year old  at 20w2d  No ctx, lof, bleeding, or dc.  No HA or vision changes.    Outpatient Medications Prior to Visit   Medication Sig Dispense Refill     Prenatal MV-Min-Fe Fum-FA-DHA (PRENATAL MULTIVITAMIN PLUS DHA) 27-0.8-250 MG CAPS Take 1 tablet by mouth daily OK to substitute formulary equivalent (Patient not taking: Reported on 1/10/2023) 100 capsule 3     No facility-administered medications prior to visit.      History   Smoking Status     Never   Smokeless Tobacco     Never      Objective:  BP 98/62 (BP Location: Right arm, Patient Position: Sitting, Cuff Size: Adult Regular)   Pulse 110   Temp 97.8  F (36.6  C) (Temporal)   Resp 21   Wt 57.6 kg (127 lb)   LMP 2022 (Exact Date)   SpO2 99%   BMI 23.67 kg/m    GENERAL: alert, not distressed  ABDOMEN: gravid, soft, non tender, non distended     Assessment and Plan:   1. Encounter for supervision of normal pregnancy in second trimester  - Urinalysis, OB Screen; Future     Chronic hep B.  Check viral load during glucose challenge test.    Return in 1 month (on 3/7/2023) for prenatal care.   Future Appointments   Date Time Provider Department Center   3/7/2023  9:00 AM Catrachito Linares MD [facility]   2023  3:20 PM Catrachito Linares MD [facility]   2023  3:20 PM Catrachito Linares MD [facility]   2023  3:20 PM Catrachito Linares MD [facility]   2023  3:00 PM Catrachito Linares MD [facility]   2023  3:20 PM Catrachito Linares MD [facility]   2023  3:20 PM Catrachito Linares MD [facility]   2023  3:20 PM Catrachito Linares MD [facility]   2023  3:20 PM Catrachito Linares MD [facility]

## 2023-03-07 ENCOUNTER — PRENATAL OFFICE VISIT (OUTPATIENT)
Dept: FAMILY MEDICINE | Facility: CLINIC | Age: 23
End: 2023-03-07
Payer: COMMERCIAL

## 2023-03-07 VITALS
HEIGHT: 62 IN | SYSTOLIC BLOOD PRESSURE: 118 MMHG | BODY MASS INDEX: 24.24 KG/M2 | OXYGEN SATURATION: 98 % | TEMPERATURE: 97.6 F | HEART RATE: 90 BPM | DIASTOLIC BLOOD PRESSURE: 56 MMHG | WEIGHT: 131.75 LBS

## 2023-03-07 DIAGNOSIS — B18.1 HEPATITIS B, CHRONIC (H): ICD-10-CM

## 2023-03-07 DIAGNOSIS — Z34.82 ENCOUNTER FOR SUPERVISION OF OTHER NORMAL PREGNANCY IN SECOND TRIMESTER: Primary | ICD-10-CM

## 2023-03-07 PROCEDURE — 99207 PR PRENATAL VISIT: CPT | Performed by: FAMILY MEDICINE

## 2023-03-07 NOTE — PROGRESS NOTES
"Subjective:  23 year old  at 24w2d  No ctx, lof, bleeding, or dc.  No HA or vision changes.    History of anemia.  Not currently taking iron.  Children with history of anemia.  Confirmed to be iron deficient for them.  Discussed iron intake.    Weight gain is adequate.    She had 1 episode where she felt like she had some stomach contractions.  She was ready to go to the hospital.  Then she vomited.  Basically everything went away.    Outpatient Medications Prior to Visit   Medication Sig Dispense Refill     Prenatal MV-Min-Fe Fum-FA-DHA (PRENATAL MULTIVITAMIN PLUS DHA) 27-0.8-250 MG CAPS Take 1 tablet by mouth daily OK to substitute formulary equivalent 100 capsule 3     No facility-administered medications prior to visit.      History   Smoking Status     Never   Smokeless Tobacco     Never      Objective:  /56 (BP Location: Right arm, Patient Position: Sitting, Cuff Size: Adult Regular)   Pulse 90   Temp 97.6  F (36.4  C) (Temporal)   Ht 1.565 m (5' 1.61\")   Wt 59.8 kg (131 lb 12 oz)   LMP 2022 (Exact Date)   SpO2 98%   BMI 24.40 kg/m    GENERAL: alert, not distressed  CHEST: clear, no rales, rhonchi, or wheezes  CARDIAC: regular without murmur, gallop, or rub  ABDOMEN: gravid, soft, non tender, non distended    No results found for this or any previous visit (from the past 24 hour(s)).   Assessment and Plan:   1. Encounter for supervision of other normal pregnancy in second trimester  Doing well.  Adequate weight gain.  Discussed iron intake.    2. Hepatitis B, chronic (H)  At next visit we will check labs and she is due for glucose test as well.  - Hep B Virus DNA Quant Real Time PCR; Future  - ALT; Future  - AST; Future        Return in 1 month (on 2023) for prenatal care.   Future Appointments   Date Time Provider Department Center   2023  3:20 PM [facility]   2023  3:20 PM [facility]res [facility]   2023  3:20 PM Catrachito Linares, " MD [facility]   5/30/2023  3:00 PM Catrachito Linares MD [facility]   6/6/2023  3:20 PM Catrachito Linares MD [facility]   6/13/2023  3:20 PM Catrachito Linares MD [facility]   6/20/2023  3:20 PM Catrachito Linares MD [facility]   6/27/2023  3:20 PM Catrachito Linares MD [facility]

## 2023-04-04 ENCOUNTER — PRENATAL OFFICE VISIT (OUTPATIENT)
Dept: FAMILY MEDICINE | Facility: CLINIC | Age: 23
End: 2023-04-04
Payer: COMMERCIAL

## 2023-04-04 VITALS
TEMPERATURE: 97.5 F | HEART RATE: 99 BPM | SYSTOLIC BLOOD PRESSURE: 90 MMHG | OXYGEN SATURATION: 99 % | WEIGHT: 136 LBS | BODY MASS INDEX: 25.19 KG/M2 | DIASTOLIC BLOOD PRESSURE: 58 MMHG

## 2023-04-04 DIAGNOSIS — B18.1 HEPATITIS B, CHRONIC (H): ICD-10-CM

## 2023-04-04 DIAGNOSIS — O99.019 ANTEPARTUM ANEMIA: ICD-10-CM

## 2023-04-04 DIAGNOSIS — Z34.82 ENCOUNTER FOR SUPERVISION OF OTHER NORMAL PREGNANCY IN SECOND TRIMESTER: Primary | ICD-10-CM

## 2023-04-04 LAB
ALT SERPL W P-5'-P-CCNC: 12 U/L (ref 10–35)
AST SERPL W P-5'-P-CCNC: 19 U/L (ref 10–35)
ERYTHROCYTE [DISTWIDTH] IN BLOOD BY AUTOMATED COUNT: 13.8 % (ref 10–15)
FERRITIN SERPL-MCNC: 9 NG/ML (ref 6–175)
GLUCOSE 1H P 50 G GLC PO SERPL-MCNC: 122 MG/DL (ref 70–129)
HCT VFR BLD AUTO: 29.1 % (ref 35–47)
HGB BLD-MCNC: 9.1 G/DL (ref 11.7–15.7)
IRON BINDING CAPACITY (ROCHE): 534 UG/DL (ref 240–430)
IRON SATN MFR SERPL: 5 % (ref 15–46)
IRON SERPL-MCNC: 26 UG/DL (ref 37–145)
MCH RBC QN AUTO: 25.1 PG (ref 26.5–33)
MCHC RBC AUTO-ENTMCNC: 31.3 G/DL (ref 31.5–36.5)
MCV RBC AUTO: 80 FL (ref 78–100)
PLATELET # BLD AUTO: 197 10E3/UL (ref 150–450)
RBC # BLD AUTO: 3.62 10E6/UL (ref 3.8–5.2)
WBC # BLD AUTO: 7.6 10E3/UL (ref 4–11)

## 2023-04-04 PROCEDURE — 84450 TRANSFERASE (AST) (SGOT): CPT | Performed by: FAMILY MEDICINE

## 2023-04-04 PROCEDURE — 90471 IMMUNIZATION ADMIN: CPT | Performed by: FAMILY MEDICINE

## 2023-04-04 PROCEDURE — 36415 COLL VENOUS BLD VENIPUNCTURE: CPT | Performed by: FAMILY MEDICINE

## 2023-04-04 PROCEDURE — 85027 COMPLETE CBC AUTOMATED: CPT | Performed by: FAMILY MEDICINE

## 2023-04-04 PROCEDURE — 99207 PR PRENATAL VISIT: CPT | Performed by: FAMILY MEDICINE

## 2023-04-04 PROCEDURE — 83550 IRON BINDING TEST: CPT | Performed by: FAMILY MEDICINE

## 2023-04-04 PROCEDURE — 83540 ASSAY OF IRON: CPT | Performed by: FAMILY MEDICINE

## 2023-04-04 PROCEDURE — 82950 GLUCOSE TEST: CPT | Performed by: FAMILY MEDICINE

## 2023-04-04 PROCEDURE — 82728 ASSAY OF FERRITIN: CPT | Performed by: FAMILY MEDICINE

## 2023-04-04 PROCEDURE — 86780 TREPONEMA PALLIDUM: CPT | Performed by: FAMILY MEDICINE

## 2023-04-04 PROCEDURE — 84460 ALANINE AMINO (ALT) (SGPT): CPT | Performed by: FAMILY MEDICINE

## 2023-04-04 PROCEDURE — 90715 TDAP VACCINE 7 YRS/> IM: CPT | Performed by: FAMILY MEDICINE

## 2023-04-04 PROCEDURE — 87517 HEPATITIS B DNA QUANT: CPT | Performed by: FAMILY MEDICINE

## 2023-04-04 NOTE — PROGRESS NOTES
Subjective:  23 year old  at 28w2d  No ctx, lof, bleeding, or dc.  No HA or vision changes.    Outpatient Medications Prior to Visit   Medication Sig Dispense Refill     Prenatal MV-Min-Fe Fum-FA-DHA (PRENATAL MULTIVITAMIN PLUS DHA) 27-0.8-250 MG CAPS Take 1 tablet by mouth daily OK to substitute formulary equivalent 100 capsule 3     No facility-administered medications prior to visit.      History   Smoking Status     Never   Smokeless Tobacco     Never      Objective:  BP 90/58 (BP Location: Right arm, Patient Position: Sitting, Cuff Size: Adult Regular)   Pulse 99   Temp 97.5  F (36.4  C) (Temporal)   Wt 61.7 kg (136 lb)   LMP 2022 (Exact Date)   SpO2 99%   BMI 25.19 kg/m    GENERAL: alert, not distressed  CHEST: clear, no rales, rhonchi, or wheezes  CARDIAC: regular without murmur, gallop, or rub  ABDOMEN: gravid, soft, non tender, non distended    No results found for this or any previous visit (from the past 24 hour(s)).   Assessment and Plan:   1. Hepatitis B, chronic (H)  Monitor labs  - ALT; Future  - AST; Future  - Hep B Virus DNA Quant Real Time PCR; Future    2. Encounter for supervision of other normal pregnancy in second trimester  - Treponema Abs w Reflex to RPR and Titer; Future  - Glucose tolerance, gest screen, 1 hour; Future    3. Antepartum anemia  Not taking FeSO4 or PNVs.  Eating foods with iron.  - CBC with platelets; Future  - Ferritin; Future  - Iron and iron binding capacity; Future     Tdap today.    Return in 1 month (on 2023) for prenatal care.   Future Appointments   Date Time Provider Department Center   2023  3:20 PM Catrachito Linares MD [facility]   2023  3:20 PM Catrachito Linares MD [facility]   2023  3:00 PM Catrachito Linares MD [facility]   2023  3:20 PM Catrachito Linares MD [facility]   2023  3:20 [facility]   2023  3:20 PM Catrachito Linares MD [facility]   2023  3:20 PM  Catrachito Linares MD [facility]

## 2023-04-06 LAB
HBV DNA SERPL NAA+PROBE-ACNC: NOT DETECTED IU/ML
T PALLIDUM AB SER QL: NONREACTIVE

## 2023-04-10 ENCOUNTER — TELEPHONE (OUTPATIENT)
Dept: FAMILY MEDICINE | Facility: CLINIC | Age: 23
End: 2023-04-10
Payer: COMMERCIAL

## 2023-04-10 DIAGNOSIS — O99.019 ANTEPARTUM ANEMIA: Primary | ICD-10-CM

## 2023-04-10 RX ORDER — FERROUS SULFATE 325(65) MG
325 TABLET ORAL
Qty: 90 TABLET | Refills: 0 | Status: SHIPPED | OUTPATIENT
Start: 2023-04-10 | End: 2023-05-02

## 2023-04-10 NOTE — TELEPHONE ENCOUNTER
Called patient and discussed results - she would like to try oral iron first.     ----- Message -----  From: Catrachito Linares MD  Sent: 4/9/2023   7:35 PM CDT  To: Vijay Winston Care Team [place]    Call:  Blood level is getting low again.  Can you tolerate oral iron meds, or do we need to set up iron infusions?

## 2023-04-10 NOTE — TELEPHONE ENCOUNTER
----- Message from Ida Mendez sent at 4/10/2023 10:35 AM CDT -----    ----- Message -----  From: Catrachito Linares MD  Sent: 4/9/2023   7:35 PM CDT  To: Vijay Winston Care Team [place]    Call:  Blood level is getting low again.  Can you tolerate oral iron meds, or do we need to set up iron infusions?

## 2023-04-10 NOTE — TELEPHONE ENCOUNTER
Ok, I sent the prescription to her pharmacy.  Should start taking that right away, then recheck labs in 2-4 weeks (at schedule appt)

## 2023-04-10 NOTE — TELEPHONE ENCOUNTER
Please see earlier encounter.    TOMER Aguilar, RN  [facility]      Call:  Blood level is getting low again.  Can you tolerate oral iron meds, or do we need to set up iron infusions?

## 2023-05-02 ENCOUNTER — PRENATAL OFFICE VISIT (OUTPATIENT)
Dept: FAMILY MEDICINE | Facility: CLINIC | Age: 23
End: 2023-05-02
Payer: COMMERCIAL

## 2023-05-02 VITALS
HEART RATE: 100 BPM | HEIGHT: 61 IN | OXYGEN SATURATION: 98 % | RESPIRATION RATE: 16 BRPM | SYSTOLIC BLOOD PRESSURE: 100 MMHG | DIASTOLIC BLOOD PRESSURE: 65 MMHG | BODY MASS INDEX: 25.86 KG/M2 | TEMPERATURE: 98.1 F | WEIGHT: 137 LBS

## 2023-05-02 DIAGNOSIS — O99.019 ANTEPARTUM ANEMIA: ICD-10-CM

## 2023-05-02 DIAGNOSIS — Z34.83 ENCOUNTER FOR SUPERVISION OF OTHER NORMAL PREGNANCY IN THIRD TRIMESTER: Primary | ICD-10-CM

## 2023-05-02 PROCEDURE — 99207 PR PRENATAL VISIT: CPT | Performed by: FAMILY MEDICINE

## 2023-05-02 RX ORDER — FERROUS GLUCONATE 324(38)MG
324 TABLET ORAL
Qty: 90 TABLET | Refills: 1 | Status: ON HOLD | OUTPATIENT
Start: 2023-05-02 | End: 2023-06-22

## 2023-05-02 NOTE — PROGRESS NOTES
"Subjective:  23 year old  at 32w2d  No ctx, lof, bleeding, or dc.  No HA or vision changes.    Outpatient Medications Prior to Visit   Medication Sig Dispense Refill     Prenatal MV-Min-Fe Fum-FA-DHA (PRENATAL MULTIVITAMIN PLUS DHA) 27-0.8-250 MG CAPS Take 1 tablet by mouth daily OK to substitute formulary equivalent 100 capsule 3     ferrous sulfate (FEROSUL) 325 (65 Fe) MG tablet Take 1 tablet (325 mg) by mouth daily (with breakfast) 90 tablet 0     No facility-administered medications prior to visit.      History   Smoking Status     Never   Smokeless Tobacco     Never      Objective:  /65 (BP Location: Right arm, Patient Position: Sitting, Cuff Size: Adult Regular)   Pulse 100   Temp 98.1  F (36.7  C) (Temporal)   Resp 16   Ht 1.555 m (5' 1.22\")   Wt 62.1 kg (137 lb)   LMP 2022 (Exact Date)   SpO2 98%   BMI 25.70 kg/m    GENERAL: alert, not distressed  CHEST: clear, no rales, rhonchi, or wheezes  CARDIAC: regular without murmur, gallop, or rub  ABDOMEN: gravid, soft, non tender, non distended    No results found for this or any previous visit (from the past 24 hour(s)).   Assessment and Plan:   1. Encounter for supervision of other normal pregnancy in third trimester  - Urinalysis, OB Screen    2. Antepartum anemia  Not tolerating FeSO4.  Does not want IV iron if possible  Discussed diet intake and will try Fe gluconate.  If not better at next visit, will insist on IV iron  - ferrous gluconate (FERGON) 324 (38 Fe) MG tablet; Take 1 tablet (324 mg) by mouth daily (with breakfast)  Dispense: 90 tablet; Refill: 1       Return in 1 month (on 2023) for prenatal care.   Future Appointments   Date Time Provider Department Center   2023  3:20 PM [facility]   2023  3:00 PM [facility]   2023  3:20 PM [facility]   2023  3:20 PM Catrachito Linares MD [facility]   2023  3:20 PM Catrachito Linares, " MD [facility]   6/27/2023  3:20 PM [facility]

## 2023-05-23 ENCOUNTER — PRENATAL OFFICE VISIT (OUTPATIENT)
Dept: FAMILY MEDICINE | Facility: CLINIC | Age: 23
End: 2023-05-23
Payer: COMMERCIAL

## 2023-05-23 VITALS
WEIGHT: 139 LBS | DIASTOLIC BLOOD PRESSURE: 60 MMHG | BODY MASS INDEX: 26.24 KG/M2 | TEMPERATURE: 97.6 F | RESPIRATION RATE: 18 BRPM | HEIGHT: 61 IN | OXYGEN SATURATION: 98 % | SYSTOLIC BLOOD PRESSURE: 96 MMHG | HEART RATE: 112 BPM

## 2023-05-23 DIAGNOSIS — Z34.83 ENCOUNTER FOR SUPERVISION OF OTHER NORMAL PREGNANCY IN THIRD TRIMESTER: Primary | ICD-10-CM

## 2023-05-23 DIAGNOSIS — D50.8 IRON DEFICIENCY ANEMIA SECONDARY TO INADEQUATE DIETARY IRON INTAKE: ICD-10-CM

## 2023-05-23 DIAGNOSIS — O99.019 ANTEPARTUM ANEMIA: ICD-10-CM

## 2023-05-23 PROCEDURE — 81003 URINALYSIS AUTO W/O SCOPE: CPT | Performed by: FAMILY MEDICINE

## 2023-05-23 PROCEDURE — 87653 STREP B DNA AMP PROBE: CPT | Performed by: FAMILY MEDICINE

## 2023-05-23 PROCEDURE — 99207 PR PRENATAL VISIT: CPT | Performed by: FAMILY MEDICINE

## 2023-05-23 RX ORDER — EPINEPHRINE 1 MG/ML
0.3 INJECTION, SOLUTION, CONCENTRATE INTRAVENOUS EVERY 5 MIN PRN
Status: CANCELLED | OUTPATIENT
Start: 2023-05-24

## 2023-05-23 RX ORDER — MEPERIDINE HYDROCHLORIDE 25 MG/ML
25 INJECTION INTRAMUSCULAR; INTRAVENOUS; SUBCUTANEOUS EVERY 30 MIN PRN
Status: CANCELLED | OUTPATIENT
Start: 2023-05-24

## 2023-05-23 RX ORDER — ALBUTEROL SULFATE 0.83 MG/ML
2.5 SOLUTION RESPIRATORY (INHALATION)
Status: CANCELLED | OUTPATIENT
Start: 2023-05-24

## 2023-05-23 RX ORDER — HEPARIN SODIUM,PORCINE 10 UNIT/ML
5 VIAL (ML) INTRAVENOUS
Status: CANCELLED | OUTPATIENT
Start: 2023-05-24

## 2023-05-23 RX ORDER — METHYLPREDNISOLONE SODIUM SUCCINATE 125 MG/2ML
125 INJECTION, POWDER, LYOPHILIZED, FOR SOLUTION INTRAMUSCULAR; INTRAVENOUS
Status: CANCELLED
Start: 2023-05-24

## 2023-05-23 RX ORDER — HEPARIN SODIUM (PORCINE) LOCK FLUSH IV SOLN 100 UNIT/ML 100 UNIT/ML
5 SOLUTION INTRAVENOUS
Status: CANCELLED | OUTPATIENT
Start: 2023-05-24

## 2023-05-23 RX ORDER — DIPHENHYDRAMINE HYDROCHLORIDE 50 MG/ML
50 INJECTION INTRAMUSCULAR; INTRAVENOUS
Status: CANCELLED
Start: 2023-05-24

## 2023-05-23 RX ORDER — ALBUTEROL SULFATE 90 UG/1
1-2 AEROSOL, METERED RESPIRATORY (INHALATION)
Status: CANCELLED
Start: 2023-05-24

## 2023-05-23 NOTE — PROGRESS NOTES
"Subjective:  23 year old  at 35w2d  No ctx, lof, bleeding, or dc.  No HA or vision changes.    Not taking iron  Last Hgb 9    Outpatient Medications Prior to Visit   Medication Sig Dispense Refill     ferrous gluconate (FERGON) 324 (38 Fe) MG tablet Take 1 tablet (324 mg) by mouth daily (with breakfast) 90 tablet 1     Prenatal MV-Min-Fe Fum-FA-DHA (PRENATAL MULTIVITAMIN PLUS DHA) 27-0.8-250 MG CAPS Take 1 tablet by mouth daily OK to substitute formulary equivalent 100 capsule 3     No facility-administered medications prior to visit.      History   Smoking Status     Never   Smokeless Tobacco     Never      Objective:  BP 96/60 (BP Location: Right arm, Patient Position: Sitting, Cuff Size: Adult Regular)   Pulse 112   Temp 97.6  F (36.4  C) (Temporal)   Resp 18   Ht 1.549 m (5' 1\")   Wt 63 kg (139 lb)   LMP 2022 (Exact Date)   SpO2 98%   BMI 26.26 kg/m    GENERAL: alert, not distressed  CHEST: clear, no rales, rhonchi, or wheezes  CARDIAC: regular without murmur, gallop, or rub  ABDOMEN: gravid, soft, non tender, non distended    Recent Results (from the past 24 hour(s))   Urinalysis, OB Screen    Collection Time: 23  3:23 PM   Result Value Ref Range    Glucose Urine Negative Negative mg/dL    Ketones Urine Negative Negative mg/dL    Protein Albumin Urine Negative Negative mg/dL      Assessment and Plan:   1. Encounter for supervision of other normal pregnancy in third trimester  - Urinalysis, OB Screen    2. Iron deficiency anemia secondary to inadequate dietary iron intake  3. Antepartum anemia  Not tolerating oral iron enough to make a difference in Hgb.  Needs Venofer infusion.    Orders entered and referral made.  - Infusion Appointment Request; Future        Future Appointments   Date Time Provider Department Center   2023  3:00 PM Catrachito Linares MD [facility]   2023  3:20 PM Catrachito Linares MD [facility]   2023  3:20 PM Catrachito Linares MD [facility] "   6/20/2023  3:20 PM [facility]   6/27/2023  3:20 PM Catrachito Linares MD [facility]   7/5/2023  3:20 PM [facility]

## 2023-05-24 LAB — GP B STREP DNA SPEC QL NAA+PROBE: NEGATIVE

## 2023-05-30 ENCOUNTER — PRENATAL OFFICE VISIT (OUTPATIENT)
Dept: FAMILY MEDICINE | Facility: CLINIC | Age: 23
End: 2023-05-30
Payer: COMMERCIAL

## 2023-05-30 VITALS
TEMPERATURE: 97.2 F | DIASTOLIC BLOOD PRESSURE: 54 MMHG | RESPIRATION RATE: 20 BRPM | HEART RATE: 99 BPM | OXYGEN SATURATION: 98 % | SYSTOLIC BLOOD PRESSURE: 90 MMHG | WEIGHT: 140 LBS | HEIGHT: 61 IN | BODY MASS INDEX: 26.43 KG/M2

## 2023-05-30 DIAGNOSIS — Z34.93 ENCOUNTER FOR SUPERVISION OF NORMAL PREGNANCY IN THIRD TRIMESTER, UNSPECIFIED GRAVIDITY: Primary | ICD-10-CM

## 2023-05-30 PROCEDURE — 99207 PR PRENATAL VISIT: CPT | Performed by: FAMILY MEDICINE

## 2023-05-31 ENCOUNTER — TELEPHONE (OUTPATIENT)
Dept: FAMILY MEDICINE | Facility: CLINIC | Age: 23
End: 2023-05-31
Payer: COMMERCIAL

## 2023-05-31 NOTE — PROGRESS NOTES
"Subjective:  23 year old  at 36w2d  No ctx, lof, bleeding, or dc.  No HA or vision changes.    Outpatient Medications Prior to Visit   Medication Sig Dispense Refill     ferrous gluconate (FERGON) 324 (38 Fe) MG tablet Take 1 tablet (324 mg) by mouth daily (with breakfast) 90 tablet 1     Prenatal MV-Min-Fe Fum-FA-DHA (PRENATAL MULTIVITAMIN PLUS DHA) 27-0.8-250 MG CAPS Take 1 tablet by mouth daily OK to substitute formulary equivalent 100 capsule 3     No facility-administered medications prior to visit.      History   Smoking Status     Never   Smokeless Tobacco     Never      Objective:  BP 90/54 (BP Location: Left arm, Patient Position: Sitting, Cuff Size: Adult Regular)   Pulse 99   Temp 97.2  F (36.2  C) (Temporal)   Resp 20   Ht 1.549 m (5' 1\")   Wt 63.5 kg (140 lb)   LMP 2022 (Exact Date)   SpO2 98%   BMI 26.45 kg/m    GENERAL: alert, not distressed  CHEST: clear, no rales, rhonchi, or wheezes  CARDIAC: regular without murmur, gallop, or rub  ABDOMEN: gravid, soft, non tender, non distended       Assessment and Plan:   1. Encounter for supervision of normal pregnancy in third trimester, unspecified   - Urinalysis, OB Screen     Still needs to schedule infusion for SARAH       Future Appointments   Date Time Provider Department Center   2023  3:20 PM Catrachito Linares MD [facility]   2023  3:20 PM Catrachito Linares MD [facility]   2023  3:20 PM Catrachito Linares MD [facility]   2023  3:20 PM Catrachito Linares MD [facility]   2023  3:20 PM Catrachito Linares MD [facility]       "

## 2023-05-31 NOTE — TELEPHONE ENCOUNTER
Called patient to assist with setting up an infusion appt into [facility] per patient preference. However, dept is not yet open.  Referral phone number provided to patient who verbalized she can independently make the appointment.  Advised patient to call back to [facility] with any questions or assistance if patient is unable to reach the infusion dept.  Patient verbalized understood.    KEV AguilarN, RN  [facility]      Catrachito Linares MD  [facility] Nurse Pool  Needs to get scheduled with infusion center ASAP.

## 2023-06-13 ENCOUNTER — PRENATAL OFFICE VISIT (OUTPATIENT)
Dept: FAMILY MEDICINE | Facility: CLINIC | Age: 23
End: 2023-06-13
Payer: COMMERCIAL

## 2023-06-13 VITALS
HEART RATE: 86 BPM | DIASTOLIC BLOOD PRESSURE: 62 MMHG | TEMPERATURE: 97.9 F | SYSTOLIC BLOOD PRESSURE: 95 MMHG | BODY MASS INDEX: 26.81 KG/M2 | OXYGEN SATURATION: 99 % | HEIGHT: 61 IN | WEIGHT: 142 LBS | RESPIRATION RATE: 20 BRPM

## 2023-06-13 DIAGNOSIS — Z34.83 ENCOUNTER FOR SUPERVISION OF OTHER NORMAL PREGNANCY IN THIRD TRIMESTER: Primary | ICD-10-CM

## 2023-06-13 DIAGNOSIS — D50.8 IRON DEFICIENCY ANEMIA SECONDARY TO INADEQUATE DIETARY IRON INTAKE: ICD-10-CM

## 2023-06-13 PROCEDURE — 99207 PR PRENATAL VISIT: CPT | Performed by: FAMILY MEDICINE

## 2023-06-13 PROCEDURE — 81003 URINALYSIS AUTO W/O SCOPE: CPT | Performed by: FAMILY MEDICINE

## 2023-06-13 NOTE — Clinical Note
It's really important for patient to get iron infusions. She hasn't scheduled this herself. Can we call and get these set up this week?

## 2023-06-13 NOTE — PROGRESS NOTES
"Subjective:  23 year old  at 38w2d  No ctx, lof, bleeding, or dc.  No HA or vision changes.    Has not had iron infusions yet.    Outpatient Medications Prior to Visit   Medication Sig Dispense Refill     ferrous gluconate (FERGON) 324 (38 Fe) MG tablet Take 1 tablet (324 mg) by mouth daily (with breakfast) 90 tablet 1     Prenatal MV-Min-Fe Fum-FA-DHA (PRENATAL MULTIVITAMIN PLUS DHA) 27-0.8-250 MG CAPS Take 1 tablet by mouth daily OK to substitute formulary equivalent 100 capsule 3     No facility-administered medications prior to visit.      History   Smoking Status     Never   Smokeless Tobacco     Never      Objective:  BP 95/62 (BP Location: Left arm, Patient Position: Sitting, Cuff Size: Adult Regular)   Pulse 86   Temp 97.9  F (36.6  C) (Temporal)   Resp 20   Ht 1.549 m (5' 1\")   Wt 64.4 kg (142 lb)   LMP 2022 (Exact Date)   SpO2 99%   BMI 26.83 kg/m    GENERAL: alert, not distressed  ABDOMEN: gravid, soft, non tender, non distended    Recent Results (from the past 24 hour(s))   Urinalysis, OB Screen    Collection Time: 23  3:00 PM   Result Value Ref Range    Glucose Urine Negative Negative mg/dL    Ketones Urine Negative Negative mg/dL    Protein Albumin Urine Negative Negative mg/dL      Assessment and Plan:   1. Encounter for supervision of normal pregnancy in third trimester   2. Iron def anemia     Pt expressed hope to deliver soon.  Discussed potential IOL at 39 weeks.  She would be interested.   Montiel score 5 today.  May be 6 by 39 weeks which would be ideal for oxytocin induction.    Has not had iron infusions yet  It will likely take some effort from us to get her to these appointment.s  I will ask our RNs to help arrange them ASAP.    Plan to follow up next week and recheck.       Future Appointments   Date Time Provider Department Center   2023  3:20 PM Catrachito Linares MD [facility]   2023  3:20 PM Catrachito Linares MD [facility]   2023  3:20 PM " Catrachito Linares MD [facility]

## 2023-06-14 ENCOUNTER — TELEPHONE (OUTPATIENT)
Dept: FAMILY MEDICINE | Facility: CLINIC | Age: 23
End: 2023-06-14
Payer: COMMERCIAL

## 2023-06-14 NOTE — TELEPHONE ENCOUNTER
Pt called back, TC transferred to [facility] Infusion line at [phone] to schedule. TC did make sure to speak to a  before transferring..

## 2023-06-14 NOTE — TELEPHONE ENCOUNTER
Called patient with no answer and no vm set up.  Called place to mom, Sherwin Irizarry to have patient call to clinic at earliest opportunity when home from work.  Mom verbalized understood.    KEV AguilarN, RN  [facility]      Catrachito Linares MD  [facility]  It's really important for patient to get iron infusions.   She hasn't scheduled this herself.   Can we call and get these set up this week?

## 2023-06-20 ENCOUNTER — PRENATAL OFFICE VISIT (OUTPATIENT)
Dept: FAMILY MEDICINE | Facility: CLINIC | Age: 23
End: 2023-06-20
Payer: COMMERCIAL

## 2023-06-20 VITALS
SYSTOLIC BLOOD PRESSURE: 105 MMHG | WEIGHT: 144 LBS | RESPIRATION RATE: 20 BRPM | BODY MASS INDEX: 27.19 KG/M2 | DIASTOLIC BLOOD PRESSURE: 66 MMHG | HEART RATE: 84 BPM | OXYGEN SATURATION: 99 % | HEIGHT: 61 IN | TEMPERATURE: 97.9 F

## 2023-06-20 DIAGNOSIS — Z34.83 ENCOUNTER FOR SUPERVISION OF OTHER NORMAL PREGNANCY IN THIRD TRIMESTER: Primary | ICD-10-CM

## 2023-06-20 PROCEDURE — 81003 URINALYSIS AUTO W/O SCOPE: CPT | Performed by: FAMILY MEDICINE

## 2023-06-20 PROCEDURE — 99207 PR PRENATAL VISIT: CPT | Performed by: FAMILY MEDICINE

## 2023-06-22 ENCOUNTER — MEDICAL CORRESPONDENCE (OUTPATIENT)
Dept: HEALTH INFORMATION MANAGEMENT | Facility: CLINIC | Age: 23
End: 2023-06-22

## 2023-06-22 PROBLEM — Z36.89 ENCOUNTER FOR TRIAGE IN PREGNANT PATIENT: Status: ACTIVE | Noted: 2023-06-22

## 2023-06-22 PROBLEM — Z34.90 ENCOUNTER FOR INDUCTION OF LABOR: Status: ACTIVE | Noted: 2023-06-22

## 2023-06-22 NOTE — PROGRESS NOTES
"Subjective:  23 year old  at 39w2d  No ctx, lof, bleeding, or dc.  No HA or vision changes.  Has not had IV iron.  Scheduled, but post partum  Really wants to be induced.    Outpatient Medications Prior to Visit   Medication Sig Dispense Refill     ferrous gluconate (FERGON) 324 (38 Fe) MG tablet Take 1 tablet (324 mg) by mouth daily (with breakfast) 90 tablet 1     Prenatal MV-Min-Fe Fum-FA-DHA (PRENATAL MULTIVITAMIN PLUS DHA) 27-0.8-250 MG CAPS Take 1 tablet by mouth daily OK to substitute formulary equivalent 100 capsule 3     No facility-administered medications prior to visit.      History   Smoking Status     Never   Smokeless Tobacco     Never      Objective:  /66 (BP Location: Right arm, Patient Position: Sitting, Cuff Size: Adult Regular)   Pulse 84   Temp 97.9  F (36.6  C) (Temporal)   Resp 20   Ht 1.56 m (5' 1.42\")   Wt 65.3 kg (144 lb)   LMP 2022 (Exact Date)   SpO2 99%   BMI 26.84 kg/m    Total weight gain: 10.9 kg (24 lb)   GENERAL: alert, not distressed  ABDOMEN: gravid, soft, non tender, non distended    Recent Results (from the past 240 hour(s))   Urinalysis, OB Screen    Collection Time: 23  3:00 PM   Result Value Ref Range    Glucose Urine Negative Negative mg/dL    Ketones Urine Negative Negative mg/dL    Protein Albumin Urine Negative Negative mg/dL   Urinalysis, OB Screen    Collection Time: 23  3:48 PM   Result Value Ref Range    Glucose Urine Negative Negative mg/dL    Ketones Urine Negative Negative mg/dL    Protein Albumin Urine Negative Negative mg/dL      Assessment and Plan:   1. Encounter for supervision of normal pregnancy in third trimester  2. Anemia in pregnancy  IOL discussed.  We discussed the risks, benefits, and alternatives.   She wishes to deliver as soon as possible.  IOL able to be scheduled in 2 days per hospital availability.  - Urinalysis, OB Screen     Future Appointments   Date Time Provider Department Center   2023  7:45 AM " [facility] L&D INDUCTION [facility]   6/26/2023  1:00 PM [facility] INFUSION CHAIR 6 [facility]FT [facility] INF   6/27/2023  3:20 PM Catrachito Linares MD [facility]   6/30/2023  1:00 PM [facility] INFUSION CHAIR 13 [facility]FT [facility]   7/3/2023  1:00 PM [facility] INFUSION CHAIR 7 [facility]FT [facility] INF   7/5/2023  3:20 PM Catrachito Linares MD [facility]

## 2023-06-23 PROBLEM — Z34.90 ENCOUNTER FOR INDUCTION OF LABOR: Status: RESOLVED | Noted: 2023-06-22 | Resolved: 2023-06-23

## 2023-06-23 PROBLEM — B18.1 HEPATITIS B, CHRONIC (H): Status: ACTIVE | Noted: 2021-11-09

## 2023-06-23 PROBLEM — Z36.89 ENCOUNTER FOR TRIAGE IN PREGNANT PATIENT: Status: RESOLVED | Noted: 2023-06-22 | Resolved: 2023-06-23

## 2023-06-24 ENCOUNTER — APPOINTMENT (OUTPATIENT)
Dept: CT IMAGING | Facility: HOSPITAL | Age: 23
End: 2023-06-24
Attending: EMERGENCY MEDICINE
Payer: COMMERCIAL

## 2023-06-24 ENCOUNTER — NURSE TRIAGE (OUTPATIENT)
Dept: NURSING | Facility: CLINIC | Age: 23
End: 2023-06-24
Payer: COMMERCIAL

## 2023-06-24 ENCOUNTER — ANCILLARY PROCEDURE (OUTPATIENT)
Dept: ULTRASOUND IMAGING | Facility: HOSPITAL | Age: 23
End: 2023-06-24
Attending: EMERGENCY MEDICINE
Payer: COMMERCIAL

## 2023-06-24 ENCOUNTER — HOSPITAL ENCOUNTER (EMERGENCY)
Facility: HOSPITAL | Age: 23
Discharge: HOME | End: 2023-06-24
Attending: EMERGENCY MEDICINE | Admitting: EMERGENCY MEDICINE
Payer: COMMERCIAL

## 2023-06-24 VITALS
HEIGHT: 61 IN | WEIGHT: 131.6 LBS | SYSTOLIC BLOOD PRESSURE: 106 MMHG | BODY MASS INDEX: 24.84 KG/M2 | DIASTOLIC BLOOD PRESSURE: 70 MMHG | HEART RATE: 75 BPM | TEMPERATURE: 98.1 F | OXYGEN SATURATION: 95 % | RESPIRATION RATE: 26 BRPM

## 2023-06-24 DIAGNOSIS — R06.09 EXERTIONAL DYSPNEA: ICD-10-CM

## 2023-06-24 DIAGNOSIS — D64.9 ANEMIA, UNSPECIFIED TYPE: ICD-10-CM

## 2023-06-24 LAB
ABO/RH(D): NORMAL
ANION GAP SERPL CALCULATED.3IONS-SCNC: 11 MMOL/L (ref 7–15)
ANTIBODY SCREEN: NEGATIVE
BASOPHILS # BLD AUTO: 0 10E3/UL (ref 0–0.2)
BASOPHILS NFR BLD AUTO: 0 %
BUN SERPL-MCNC: 8.6 MG/DL (ref 6–20)
CALCIUM SERPL-MCNC: 9.2 MG/DL (ref 8.6–10)
CHLORIDE SERPL-SCNC: 107 MMOL/L (ref 98–107)
CREAT SERPL-MCNC: 0.54 MG/DL (ref 0.51–0.95)
DEPRECATED HCO3 PLAS-SCNC: 22 MMOL/L (ref 22–29)
EOSINOPHIL # BLD AUTO: 0.4 10E3/UL (ref 0–0.7)
EOSINOPHIL NFR BLD AUTO: 4 %
ERYTHROCYTE [DISTWIDTH] IN BLOOD BY AUTOMATED COUNT: 19.1 % (ref 10–15)
GFR SERPL CREATININE-BSD FRML MDRD: >90 ML/MIN/1.73M2
GLUCOSE SERPL-MCNC: 105 MG/DL (ref 70–99)
HCT VFR BLD AUTO: 30.3 % (ref 35–47)
HGB BLD-MCNC: 9.1 G/DL (ref 11.7–15.7)
HOLD SPECIMEN: NORMAL
HOLD SPECIMEN: NORMAL
IMM GRANULOCYTES # BLD: 0.1 10E3/UL
IMM GRANULOCYTES NFR BLD: 1 %
LYMPHOCYTES # BLD AUTO: 1.9 10E3/UL (ref 0.8–5.3)
LYMPHOCYTES NFR BLD AUTO: 18 %
MCH RBC QN AUTO: 22.1 PG (ref 26.5–33)
MCHC RBC AUTO-ENTMCNC: 30 G/DL (ref 31.5–36.5)
MCV RBC AUTO: 74 FL (ref 78–100)
MONOCYTES # BLD AUTO: 0.9 10E3/UL (ref 0–1.3)
MONOCYTES NFR BLD AUTO: 8 %
NEUTROPHILS # BLD AUTO: 7.4 10E3/UL (ref 1.6–8.3)
NEUTROPHILS NFR BLD AUTO: 69 %
NRBC # BLD AUTO: 0 10E3/UL
NRBC BLD AUTO-RTO: 0 /100
NT-PROBNP SERPL-MCNC: 53 PG/ML (ref 0–450)
PLATELET # BLD AUTO: 239 10E3/UL (ref 150–450)
POTASSIUM SERPL-SCNC: 3.2 MMOL/L (ref 3.4–5.3)
RBC # BLD AUTO: 4.12 10E6/UL (ref 3.8–5.2)
SODIUM SERPL-SCNC: 140 MMOL/L (ref 136–145)
SPECIMEN EXPIRATION DATE: NORMAL
TROPONIN T SERPL HS-MCNC: <6 NG/L
WBC # BLD AUTO: 10.8 10E3/UL (ref 4–11)

## 2023-06-24 PROCEDURE — 93005 ELECTROCARDIOGRAM TRACING: CPT | Performed by: EMERGENCY MEDICINE

## 2023-06-24 PROCEDURE — 71275 CT ANGIOGRAPHY CHEST: CPT

## 2023-06-24 PROCEDURE — 86850 RBC ANTIBODY SCREEN: CPT | Performed by: EMERGENCY MEDICINE

## 2023-06-24 PROCEDURE — 93308 TTE F-UP OR LMTD: CPT

## 2023-06-24 PROCEDURE — 83880 ASSAY OF NATRIURETIC PEPTIDE: CPT | Performed by: EMERGENCY MEDICINE

## 2023-06-24 PROCEDURE — 84484 ASSAY OF TROPONIN QUANT: CPT | Performed by: EMERGENCY MEDICINE

## 2023-06-24 PROCEDURE — 86901 BLOOD TYPING SEROLOGIC RH(D): CPT | Performed by: EMERGENCY MEDICINE

## 2023-06-24 PROCEDURE — 80048 BASIC METABOLIC PNL TOTAL CA: CPT | Performed by: EMERGENCY MEDICINE

## 2023-06-24 PROCEDURE — 250N000011 HC RX IP 250 OP 636: Mod: JZ | Performed by: EMERGENCY MEDICINE

## 2023-06-24 PROCEDURE — 99285 EMERGENCY DEPT VISIT HI MDM: CPT | Mod: 25

## 2023-06-24 PROCEDURE — 36415 COLL VENOUS BLD VENIPUNCTURE: CPT | Performed by: EMERGENCY MEDICINE

## 2023-06-24 PROCEDURE — 85014 HEMATOCRIT: CPT | Performed by: EMERGENCY MEDICINE

## 2023-06-24 RX ORDER — IOPAMIDOL 755 MG/ML
90 INJECTION, SOLUTION INTRAVASCULAR ONCE
Status: COMPLETED | OUTPATIENT
Start: 2023-06-24 | End: 2023-06-24

## 2023-06-24 RX ADMIN — IOPAMIDOL 90 ML: 755 INJECTION, SOLUTION INTRAVENOUS at 19:00

## 2023-06-24 ASSESSMENT — ACTIVITIES OF DAILY LIVING (ADL)
ADLS_ACUITY_SCORE: 35
ADLS_ACUITY_SCORE: 35

## 2023-06-24 NOTE — ED PROVIDER NOTES
"EMERGENCY DEPARTMENT NOTE     Name: Ifeanyi Irizarry    Age/Sex: 23 year old female   MRN: [id number]   Evaluation Date & Time:  6/24/2023  5:41 PM    PCP:    Kimber Penaloza   ED Provider: Abundio Tobar D.O.       CHIEF COMPLAINT    Shortness of Breath       DIAGNOSIS & DISPOSITION/MEDICAL DECISION MAKING     1. Exertional dyspnea    2. Anemia, unspecified type        Ifeanyi Irizarry is a 23 year old female with relevant past history of antepartum anemia who presents to the emergency department for evaluation of shortness of breath.    Differential  diagnosis  considered included but not limited to  preeclampsia, pulmonary embolism, symptomatic anemia, postpartum cardiomyopathy    Medical Decision Making  Patient was normotensive, hemoglobin stable for anemia postpartum, CT of the chest without evidence of pulmonary embolism or other acute process, troponin and BNP not elevated, EKG normal sinus rhythm, bedside ultrasound with normal wall motion.  Suspect shortness of breath may be secondary to anemia.  Patient is scheduled follow-up next week with OB/GYN and will keep this appointment.  Return criteria discussed and if the patient were to have progressive shortness of breath or develop any new symptoms including chest pain will return to the emergency department.    Interventions:None  Discharge Vital Signs:/70   Pulse 75   Temp 98.1  F (36.7  C) (Temporal)   Resp 26   Ht 1.549 m (5' 1\")   Wt 59.7 kg (131 lb 9.6 oz)   LMP 09/18/2022 (Exact Date)   SpO2 95%   Breastfeeding No   BMI 24.87 kg/m      DISPOSITION: Home    Diagnostic studies:  Imaging:  POC US ECHO LIMITED   ED Interpretation   Point-of-care ultrasound showed no pericardial effusion with normal wall motion and valvular function      CT Chest Pulmonary Embolism w Contrast   Final Result   IMPRESSION:   1.  No evidence for pulmonary embolism or other acute abnormality in the chest.         Lab:  Labs Ordered and Resulted from Time of ED Arrival " to Time of ED Departure   BASIC METABOLIC PANEL - Abnormal       Result Value    Sodium 140      Potassium 3.2 (*)     Chloride 107      Carbon Dioxide (CO2) 22      Anion Gap 11      Urea Nitrogen 8.6      Creatinine 0.54      Calcium 9.2      Glucose 105 (*)     GFR Estimate >90     CBC WITH PLATELETS AND DIFFERENTIAL - Abnormal    WBC Count 10.8      RBC Count 4.12      Hemoglobin 9.1 (*)     Hematocrit 30.3 (*)     MCV 74 (*)     MCH 22.1 (*)     MCHC 30.0 (*)     RDW 19.1 (*)     Platelet Count 239      % Neutrophils 69      % Lymphocytes 18      % Monocytes 8      % Eosinophils 4      % Basophils 0      % Immature Granulocytes 1      NRBCs per 100 WBC 0      Absolute Neutrophils 7.4      Absolute Lymphocytes 1.9      Absolute Monocytes 0.9      Absolute Eosinophils 0.4      Absolute Basophils 0.0      Absolute Immature Granulocytes 0.1      Absolute NRBCs 0.0     TROPONIN T, HIGH SENSITIVITY - Normal    Troponin T, High Sensitivity <6     NT PROBNP INPATIENT - Normal    N terminal Pro BNP Inpatient 53     TYPE AND SCREEN, ADULT    ABO/RH(D) B POS      Antibody Screen Negative      SPECIMEN EXPIRATION DATE [id number]     ABO/RH TYPE AND SCREEN               Triage note reviewed:  Pt presents 2 days post partum with shortness of breath and difficulty taking a deep breath. Pt had no complications with pregnancy or delivery.     Triage Assessment     Row Name 06/24/23 3863       Triage Assessment (Adult)    Airway WDL WDL       Respiratory WDL    Respiratory WDL rhythm/pattern;X    Rhythm/Pattern, Respiratory shortness of breath       Skin Circulation/Temperature WDL    Skin Circulation/Temperature WDL WDL       Cardiac WDL    Cardiac WDL WDL       Peripheral/Neurovascular WDL    Peripheral Neurovascular WDL WDL       Cognitive/Neuro/Behavioral WDL    Cognitive/Neuro/Behavioral WDL WDL                  History:    Supplemental history from: Documented in chart, if applicable    External Record(s) reviewed:  Labor and delivery note from 6/22/2023    Work Up:    Chart documentation includes differential considered and any EKGs or imaging independently interpreted by provider, where specified.    In additional to work up documented, I considered the following work up: Documented in chart, if applicable.    External consultation:    Discussion of management with another provider: Documented in chart, if applicable    Complicating factors:    Care impacted by chronic illness: Other: Antepartum anemia    Care affected by social determinants of health: N/A    Disposition considerations: Discharge. No recommendations on prescription strength medication(s). N/A.    At the conclusion of the encounter I discussed the results of all of the tests and the disposition. The questions were answered. The patient or family acknowledged understanding and was agreeable with the care plan.    TOTAL CRITICAL CARE TIME (EXCLUDING PROCEDURES): Not applicable    PROCEDURES:     PROCEDURE:    Emergency Department Limited Bedside Screening Cardiac Ultrasound   INDICATIONS: shortness of breath   PROCEDURE PROVIDER: Abundio Tobar    WINDOW AND FINDINGS:    SUB-XYPHOID     :      Cardiac activity: Normal  Pericardial effusion: No clinically significant pericardial effusion  Signs of Tamponade physiology: Absent   PARASTERNAL    :  Cardiac activity: Normal  Pericardial effusion: No clinically significant pericardial effusion  Signs of Tamponade physiology: Absent     IMAGES SAVED AND STORED FOR ARCHIVE AND REVIEW: No         EMERGENCY DEPARTMENT COURSE   6:17 PM I met with the patient to gather history and to perform my initial exam.  We discussed treatment options and the plan for care while in the Emergency Department.    ED INTERVENTIONS     Medications   iopamidol (ISOVUE-370) solution 90 mL (90 mLs Intravenous $Given 6/24/23 1900)       DISCHARGE MEDICATIONS        Review of your medicines      UNREVIEWED medicines. Ask your doctor about these  medicines      Dose / Directions   acetaminophen 325 MG tablet  Commonly known as: TYLENOL  Used for:  (normal spontaneous vaginal delivery)      Dose: 325-650 mg  Take 1-2 tablets (325-650 mg) by mouth every 6 hours as needed for mild pain  Quantity: 30 tablet  Refills: 0     docusate sodium 100 MG capsule  Commonly known as: COLACE  Used for:  (normal spontaneous vaginal delivery)      Dose: 100 mg  Take 1 capsule (100 mg) by mouth 2 times daily as needed for constipation  Quantity: 28 capsule  Refills: 0     ibuprofen 600 MG tablet  Commonly known as: ADVIL/MOTRIN  Used for:  (normal spontaneous vaginal delivery)      Dose: 600 mg  Take 1 tablet (600 mg) by mouth every 6 hours as needed for moderate pain  Quantity: 30 tablet  Refills: 0              INFORMATION SOURCE AND LIMITATIONS    History/Exam limitations: N/A  Patient information was obtained from: the patient   Use of : N/A    HISTORY OF PRESENT ILLNESS   Ifeanyi Irizarry is a 23 year old year old female with a relevant past history of antepartum anemia, who presents to this ED with family for evaluation of shortness of breath. The patient states that she has had increasingly worsening shortness of breath following delivery of her fourth baby on . She notes that the shortness of breath has been constant and is exacerbated by laying down. She noticed that she was short of breath on  and reports having a fever. The patient is unsure of if her blood pressure is as low as it currently is at this ED. She denies having leg pain or swelling or having abnormal bleeding following delivery.    Per chart review, the patient was seen on 23 at [facility] [place] for a normal spontaneous vaginal delivery. Pregnancy noted for severe iron-deficiency anemia and positive Hep B SAg. Patient got first dose of IV iron at [facility] before discharge and was recommended to get get two more dose outpatient. The patient was discharged home with  plans to continue on with normal physical activity and diet and follow up with Dr. Catrachito Linares within two weeks and six weeks for routine postpartum care.     REVIEW OF SYSTEMS:   All other systems reviewed and are negative except as noted above in HPI.    PATIENT HISTORY     Past Medical History:   Diagnosis Date     Anemia affecting pregnancy 2017     Anemia during pregnancy in third trimester      Chlamydia infection 2016     Hepatitis B complicating pregnancy in third trimester      History of fetal demise, not currently pregnant 2016      (normal spontaneous vaginal delivery)      Postpartum hemorrhage 2019     Short cervix during pregnancy in third trimester 2017     Short femur of fetus on prenatal ultrasound 2017     Supervision of other normal pregnancy, antepartum 11/10/2019     Patient Active Problem List   Diagnosis     Antepartum anemia      (normal spontaneous vaginal delivery)     Hepatitis B, chronic (H)     Bacteriuria in pregnancy     Iron deficiency anemia secondary to inadequate dietary iron intake     History reviewed. No pertinent surgical history.    No Known Allergies    OUTPATIENT MEDICATIONS     Discharge Medication List as of 2023  8:58 PM         Vitals:    23 2030 23 2100 235   BP: 99/63 96/66 101/66 106/70   Pulse: 81 82 83 75   Resp:       Temp:       TempSrc:       SpO2: 97% 98% 96% 95%   Weight:       Height:           Physical Exam   Constitutional: Oriented to person, place, and time. Appears well-developed and well-nourished.   HEENT:    Head: Atraumatic.   Neck: Normal range of motion. Neck supple.   Cardiovascular: Normal rate, regular rhythm and normal heart sounds.    Pulmonary/Chest: Normal effort  and breath sounds normal.   Abdominal: Soft. Bowel sounds are normal.   Musculoskeletal: Normal range of motion.   Neurological: Alert and oriented to person, place, and time. Normal strength. No  sensory deficit. No cranial nerve deficit.  Skin: Skin is warm and dry.   Psychiatric: Normal mood and affect. Behavior is normal. Thought content normal.     DIAGNOSTICS    LABORATORY FINDINGS (REVIEWED AND INTERPRETED):  Labs Ordered and Resulted from Time of ED Arrival to Time of ED Departure   BASIC METABOLIC PANEL - Abnormal       Result Value    Sodium 140      Potassium 3.2 (*)     Chloride 107      Carbon Dioxide (CO2) 22      Anion Gap 11      Urea Nitrogen 8.6      Creatinine 0.54      Calcium 9.2      Glucose 105 (*)     GFR Estimate >90     CBC WITH PLATELETS AND DIFFERENTIAL - Abnormal    WBC Count 10.8      RBC Count 4.12      Hemoglobin 9.1 (*)     Hematocrit 30.3 (*)     MCV 74 (*)     MCH 22.1 (*)     MCHC 30.0 (*)     RDW 19.1 (*)     Platelet Count 239      % Neutrophils 69      % Lymphocytes 18      % Monocytes 8      % Eosinophils 4      % Basophils 0      % Immature Granulocytes 1      NRBCs per 100 WBC 0      Absolute Neutrophils 7.4      Absolute Lymphocytes 1.9      Absolute Monocytes 0.9      Absolute Eosinophils 0.4      Absolute Basophils 0.0      Absolute Immature Granulocytes 0.1      Absolute NRBCs 0.0     TROPONIN T, HIGH SENSITIVITY - Normal    Troponin T, High Sensitivity <6     NT PROBNP INPATIENT - Normal    N terminal Pro BNP Inpatient 53     TYPE AND SCREEN, ADULT    ABO/RH(D) B POS      Antibody Screen Negative      SPECIMEN EXPIRATION DATE [id number]     ABO/RH TYPE AND SCREEN         IMAGING (REVIEWED AND INTERPRETED):  POC US ECHO LIMITED   ED Interpretation   Point-of-care ultrasound showed no pericardial effusion with normal wall motion and valvular function      CT Chest Pulmonary Embolism w Contrast   Final Result   IMPRESSION:   1.  No evidence for pulmonary embolism or other acute abnormality in the chest.          ECG (REVIEWED AND INTERPRETED):   ECG:   Performed at: 8:17 PM, 06/24/23  HR: 91 BPM  Rhythm: Sinus rhythm  Axis: 31  QRS duration: 78 ms  QTC: 460  ms  ST changes: No ST segment elevation or depression, no T wave inversion, no Q wave.  Interpretation: Normal sinus rhythm  Compared to most recent ECG from: No prior for comparison    I have reviewed the patient's ECG, with comments made as listed above. Please see scanned image for full interpretation.       I, Phuong Keene, am serving as a scribe to document services personally performed by Abundio Tobar D.O., based on my observation and the provider s statements to me.    I, Abundio Tobar D.O., attest that Phuong Keene is acting in a scribe capacity, has observed my performance of the services and has documented them in accordance with my direction.    Abundio Tobar D.O.  EMERGENCY MEDICINE   06/24/23  [facility] EMERGENCY DEPARTMENT  [address] [place] [id number]  [phone]  Dept: [phone]     Abundio Tobar DO  06/26/23 0200

## 2023-06-24 NOTE — ED TRIAGE NOTES
Pt presents 2 days post partum with shortness of breath and difficulty taking a deep breath. Pt had no complications with pregnancy or delivery.     Triage Assessment     Row Name 06/24/23 9500       Triage Assessment (Adult)    Airway WDL WDL       Respiratory WDL    Respiratory WDL rhythm/pattern;X    Rhythm/Pattern, Respiratory shortness of breath       Skin Circulation/Temperature WDL    Skin Circulation/Temperature WDL WDL       Cardiac WDL    Cardiac WDL WDL       Peripheral/Neurovascular WDL    Peripheral Neurovascular WDL WDL       Cognitive/Neuro/Behavioral WDL    Cognitive/Neuro/Behavioral WDL WDL

## 2023-06-24 NOTE — TELEPHONE ENCOUNTER
"Nurse Triage SBAR    Is this a 2nd Level Triage? NO    Situation: Pt called with c/o difficulty breathing.    Background: Pt had a baby vaginally on Thursday.    Assessment: Pt states she has difficulty breathing at rest. This has been going on since she gave birth but has gotten worse. She is also c/o wheezing. Denies fever.    Protocol Recommended Disposition:   Go to ED Now    Recommendation:  Dispo to go to ER. Pt states she knows what location she would go to.    Reason for Disposition    [1] MODERATE difficulty breathing (e.g., speaks in phrases, SOB even at rest, pulse 100-120) AND [2] NEW-onset or WORSE than normal    Additional Information    Negative: SEVERE difficulty breathing (e.g., struggling for each breath, speaks in single words)    Negative: [1] Breathing stopped AND [2] hasn't returned    Negative: Choking on something    Negative: Bluish (or gray) lips or face now    Negative: Difficult to awaken or acting confused (e.g., disoriented, slurred speech)    Negative: Passed out (i.e., lost consciousness, collapsed and was not responding)    Negative: Wheezing started suddenly after medicine, an allergic food or bee sting    Negative: Stridor    Negative: Slow, shallow and weak breathing    Negative: Sounds like a life-threatening emergency to the triager    Negative: Wheezing can be heard across the room    Negative: History of prior \"blood clot\" in leg or lungs (i.e., deep vein thrombosis, pulmonary embolism)    Negative: History of inherited increased risk of blood clots (e.g., Factor 5 Leiden, Anti-thrombin 3, Protein C or Protein S deficiency, Prothrombin mutation)    Negative: Major surgery in the past month    Negative: Oxygen level (e.g., pulse oximetry) 90 percent or lower    Protocols used: BREATHING DIFFICULTY-A-NIRALI Queen RN  [facility] Nurse Advisor   6/24/2023  4:41 PM  "

## 2023-06-25 LAB
ATRIAL RATE - MUSE: 91 BPM
DIASTOLIC BLOOD PRESSURE - MUSE: 63 MMHG
INTERPRETATION ECG - MUSE: NORMAL
P AXIS - MUSE: 57 DEGREES
PR INTERVAL - MUSE: 130 MS
QRS DURATION - MUSE: 78 MS
QT - MUSE: 374 MS
QTC - MUSE: 460 MS
R AXIS - MUSE: 31 DEGREES
SYSTOLIC BLOOD PRESSURE - MUSE: 99 MMHG
T AXIS - MUSE: 4 DEGREES
VENTRICULAR RATE- MUSE: 91 BPM

## 2023-06-25 NOTE — DISCHARGE INSTRUCTIONS
Follow-up on Tuesday for your scheduled postpartum visit.  If you have increasing shortness of breath or develop any new symptoms including fever, chest pain or heavy vaginal bleeding return to the emergency department.

## 2023-06-30 ENCOUNTER — INFUSION THERAPY VISIT (OUTPATIENT)
Dept: INFUSION THERAPY | Facility: HOSPITAL | Age: 23
End: 2023-06-30
Attending: FAMILY MEDICINE
Payer: COMMERCIAL

## 2023-06-30 VITALS
RESPIRATION RATE: 20 BRPM | TEMPERATURE: 98.1 F | DIASTOLIC BLOOD PRESSURE: 68 MMHG | SYSTOLIC BLOOD PRESSURE: 124 MMHG | HEART RATE: 97 BPM | OXYGEN SATURATION: 96 %

## 2023-06-30 DIAGNOSIS — D50.8 IRON DEFICIENCY ANEMIA SECONDARY TO INADEQUATE DIETARY IRON INTAKE: Primary | ICD-10-CM

## 2023-06-30 DIAGNOSIS — O99.019 ANTEPARTUM ANEMIA: ICD-10-CM

## 2023-06-30 PROCEDURE — 258N000003 HC RX IP 258 OP 636: Performed by: FAMILY MEDICINE

## 2023-06-30 PROCEDURE — 96366 THER/PROPH/DIAG IV INF ADDON: CPT

## 2023-06-30 PROCEDURE — 96365 THER/PROPH/DIAG IV INF INIT: CPT

## 2023-06-30 PROCEDURE — 250N000011 HC RX IP 250 OP 636: Mod: JZ | Performed by: FAMILY MEDICINE

## 2023-06-30 RX ORDER — EPINEPHRINE 1 MG/ML
0.3 INJECTION, SOLUTION INTRAMUSCULAR; SUBCUTANEOUS EVERY 5 MIN PRN
Status: CANCELLED | OUTPATIENT
Start: 2023-07-02

## 2023-06-30 RX ORDER — MEPERIDINE HYDROCHLORIDE 25 MG/ML
25 INJECTION INTRAMUSCULAR; INTRAVENOUS; SUBCUTANEOUS EVERY 30 MIN PRN
Status: CANCELLED | OUTPATIENT
Start: 2023-07-02

## 2023-06-30 RX ORDER — HEPARIN SODIUM,PORCINE 10 UNIT/ML
5 VIAL (ML) INTRAVENOUS
Status: CANCELLED | OUTPATIENT
Start: 2023-07-02

## 2023-06-30 RX ORDER — HEPARIN SODIUM (PORCINE) LOCK FLUSH IV SOLN 100 UNIT/ML 100 UNIT/ML
5 SOLUTION INTRAVENOUS
Status: CANCELLED | OUTPATIENT
Start: 2023-07-02

## 2023-06-30 RX ORDER — METHYLPREDNISOLONE SODIUM SUCCINATE 125 MG/2ML
125 INJECTION, POWDER, LYOPHILIZED, FOR SOLUTION INTRAMUSCULAR; INTRAVENOUS
Status: CANCELLED
Start: 2023-07-02

## 2023-06-30 RX ORDER — DIPHENHYDRAMINE HYDROCHLORIDE 50 MG/ML
50 INJECTION INTRAMUSCULAR; INTRAVENOUS
Status: CANCELLED
Start: 2023-07-02

## 2023-06-30 RX ORDER — MEPERIDINE HYDROCHLORIDE 25 MG/ML
25 INJECTION INTRAMUSCULAR; INTRAVENOUS; SUBCUTANEOUS EVERY 30 MIN PRN
Status: DISCONTINUED | OUTPATIENT
Start: 2023-06-30 | End: 2023-06-30 | Stop reason: HOSPADM

## 2023-06-30 RX ORDER — DIPHENHYDRAMINE HYDROCHLORIDE 50 MG/ML
50 INJECTION INTRAMUSCULAR; INTRAVENOUS
Status: DISCONTINUED | OUTPATIENT
Start: 2023-06-30 | End: 2023-06-30 | Stop reason: HOSPADM

## 2023-06-30 RX ORDER — METHYLPREDNISOLONE SODIUM SUCCINATE 125 MG/2ML
125 INJECTION, POWDER, LYOPHILIZED, FOR SOLUTION INTRAMUSCULAR; INTRAVENOUS
Status: DISCONTINUED | OUTPATIENT
Start: 2023-06-30 | End: 2023-06-30 | Stop reason: HOSPADM

## 2023-06-30 RX ORDER — ALBUTEROL SULFATE 90 UG/1
1-2 AEROSOL, METERED RESPIRATORY (INHALATION)
Status: CANCELLED
Start: 2023-07-02

## 2023-06-30 RX ORDER — ALBUTEROL SULFATE 90 UG/1
1-2 AEROSOL, METERED RESPIRATORY (INHALATION)
Status: DISCONTINUED | OUTPATIENT
Start: 2023-06-30 | End: 2023-06-30 | Stop reason: HOSPADM

## 2023-06-30 RX ORDER — EPINEPHRINE 1 MG/ML
0.3 INJECTION, SOLUTION INTRAMUSCULAR; SUBCUTANEOUS EVERY 5 MIN PRN
Status: DISCONTINUED | OUTPATIENT
Start: 2023-06-30 | End: 2023-06-30 | Stop reason: HOSPADM

## 2023-06-30 RX ORDER — ALBUTEROL SULFATE 0.83 MG/ML
2.5 SOLUTION RESPIRATORY (INHALATION)
Status: DISCONTINUED | OUTPATIENT
Start: 2023-06-30 | End: 2023-06-30 | Stop reason: HOSPADM

## 2023-06-30 RX ORDER — ALBUTEROL SULFATE 0.83 MG/ML
2.5 SOLUTION RESPIRATORY (INHALATION)
Status: CANCELLED | OUTPATIENT
Start: 2023-07-02

## 2023-06-30 RX ADMIN — SODIUM CHLORIDE 250 ML: 9 INJECTION, SOLUTION INTRAVENOUS at 13:14

## 2023-06-30 RX ADMIN — IRON SUCROSE 300 MG: 20 INJECTION, SOLUTION INTRAVENOUS at 13:35

## 2023-06-30 NOTE — PROGRESS NOTES
Infusion Nursing Note:  Ifeanyi Irizarry presents today for Iron, dose 2/3.    Patient seen by provider today: No   present during visit today: Not Applicable.    Note: Ifeanyi comes to infusion ambulatory and in stable condition. Confirms that she is here for an Iron infusion. States had the first dose in the hospital and tolerated well. States shortness of breath is improving. A peripheral IV was placed in the right forearm x 1 attempt. The Iron was administered over 90 minutes and then the line was flushed with NS afterwards. Once completed, the line was removed and the site was covered with 2x2 gauze and secured in place with coban. Left infusion ambulatory and in stable condition. Will return on Monday for the last appointment.     Intravenous Access:  Peripheral IV placed.    Treatment Conditions:  Not Applicable.    Post Infusion Assessment:  Patient tolerated infusion without incident.  Site patent and intact, free from redness, edema or discomfort.  Access discontinued per protocol.     Discharge Plan:   Discharge instructions reviewed with: Patient.  Patient and/or family verbalized understanding of discharge instructions and all questions answered.  AVS to patient via [facility].  Patient will return on 7/3 for next appointment.   Patient discharged in stable condition accompanied by: self.  Departure Mode: Ambulatory.    Anitra Goode RN

## 2023-07-03 ENCOUNTER — INFUSION THERAPY VISIT (OUTPATIENT)
Dept: INFUSION THERAPY | Facility: HOSPITAL | Age: 23
End: 2023-07-03
Attending: FAMILY MEDICINE
Payer: COMMERCIAL

## 2023-07-03 VITALS
HEART RATE: 72 BPM | RESPIRATION RATE: 16 BRPM | SYSTOLIC BLOOD PRESSURE: 114 MMHG | OXYGEN SATURATION: 95 % | DIASTOLIC BLOOD PRESSURE: 64 MMHG | TEMPERATURE: 98 F

## 2023-07-03 DIAGNOSIS — O99.019 ANTEPARTUM ANEMIA: ICD-10-CM

## 2023-07-03 DIAGNOSIS — D50.8 IRON DEFICIENCY ANEMIA SECONDARY TO INADEQUATE DIETARY IRON INTAKE: Primary | ICD-10-CM

## 2023-07-03 PROCEDURE — 258N000003 HC RX IP 258 OP 636: Performed by: FAMILY MEDICINE

## 2023-07-03 PROCEDURE — 250N000011 HC RX IP 250 OP 636: Mod: JZ | Performed by: FAMILY MEDICINE

## 2023-07-03 PROCEDURE — 96366 THER/PROPH/DIAG IV INF ADDON: CPT

## 2023-07-03 PROCEDURE — 96365 THER/PROPH/DIAG IV INF INIT: CPT

## 2023-07-03 RX ORDER — METHYLPREDNISOLONE SODIUM SUCCINATE 125 MG/2ML
125 INJECTION, POWDER, LYOPHILIZED, FOR SOLUTION INTRAMUSCULAR; INTRAVENOUS
Status: CANCELLED
Start: 2023-07-04

## 2023-07-03 RX ORDER — DIPHENHYDRAMINE HYDROCHLORIDE 50 MG/ML
50 INJECTION INTRAMUSCULAR; INTRAVENOUS
Status: CANCELLED
Start: 2023-07-04

## 2023-07-03 RX ORDER — EPINEPHRINE 1 MG/ML
0.3 INJECTION, SOLUTION INTRAMUSCULAR; SUBCUTANEOUS EVERY 5 MIN PRN
Status: CANCELLED | OUTPATIENT
Start: 2023-07-04

## 2023-07-03 RX ORDER — ALBUTEROL SULFATE 90 UG/1
1-2 AEROSOL, METERED RESPIRATORY (INHALATION)
Status: CANCELLED
Start: 2023-07-04

## 2023-07-03 RX ORDER — HEPARIN SODIUM (PORCINE) LOCK FLUSH IV SOLN 100 UNIT/ML 100 UNIT/ML
5 SOLUTION INTRAVENOUS
Status: CANCELLED | OUTPATIENT
Start: 2023-07-04

## 2023-07-03 RX ORDER — ALBUTEROL SULFATE 0.83 MG/ML
2.5 SOLUTION RESPIRATORY (INHALATION)
Status: CANCELLED | OUTPATIENT
Start: 2023-07-04

## 2023-07-03 RX ORDER — MEPERIDINE HYDROCHLORIDE 25 MG/ML
25 INJECTION INTRAMUSCULAR; INTRAVENOUS; SUBCUTANEOUS EVERY 30 MIN PRN
Status: CANCELLED | OUTPATIENT
Start: 2023-07-04

## 2023-07-03 RX ORDER — HEPARIN SODIUM,PORCINE 10 UNIT/ML
5 VIAL (ML) INTRAVENOUS
Status: CANCELLED | OUTPATIENT
Start: 2023-07-04

## 2023-07-03 RX ORDER — HEPARIN SODIUM (PORCINE) LOCK FLUSH IV SOLN 100 UNIT/ML 100 UNIT/ML
5 SOLUTION INTRAVENOUS
Status: DISCONTINUED | OUTPATIENT
Start: 2023-07-03 | End: 2023-07-03

## 2023-07-03 RX ADMIN — SODIUM CHLORIDE 250 ML: 9 INJECTION, SOLUTION INTRAVENOUS at 11:13

## 2023-07-03 RX ADMIN — IRON SUCROSE 300 MG: 20 INJECTION, SOLUTION INTRAVENOUS at 11:35

## 2023-07-03 NOTE — PROGRESS NOTES
Infusion Nursing Note:  Ifeanyi Irizarry presents today for #3/3 venofer 300 mg infusions.  Patient seen by provider today: No   present during visit today: Not Applicable.    Note: Ifeanyi arrived ambulatory. She reports tolerating her first 2 venofer infusions without any side effects. States she is feeling well today and denies shortness of breath.  Iron infused over 90 minutes followed by normal saline. IV removed prior to discharge.      Intravenous Access:  Peripheral IV placed.    Treatment Conditions:  Not Applicable.      Post Infusion Assessment:  Patient tolerated infusion without incident.  Site patent and intact, free from redness, edema or discomfort.  Access discontinued per protocol.       Discharge Plan:   Patient discharged in stable condition accompanied by: self.  Departure Mode: Ambulatory.      Soraya Maxwell RN

## 2023-08-02 ENCOUNTER — MEDICAL CORRESPONDENCE (OUTPATIENT)
Dept: HEALTH INFORMATION MANAGEMENT | Facility: CLINIC | Age: 23
End: 2023-08-02

## 2023-08-02 ENCOUNTER — PRENATAL OFFICE VISIT (OUTPATIENT)
Dept: FAMILY MEDICINE | Facility: CLINIC | Age: 23
End: 2023-08-02
Payer: COMMERCIAL

## 2023-08-02 VITALS
TEMPERATURE: 97.5 F | SYSTOLIC BLOOD PRESSURE: 94 MMHG | OXYGEN SATURATION: 97 % | BODY MASS INDEX: 23.41 KG/M2 | WEIGHT: 124 LBS | DIASTOLIC BLOOD PRESSURE: 62 MMHG | RESPIRATION RATE: 16 BRPM | HEIGHT: 61 IN | HEART RATE: 83 BPM

## 2023-08-02 DIAGNOSIS — Z30.09 GENERAL COUNSELING FOR PRESCRIPTION OF ORAL CONTRACEPTIVES: ICD-10-CM

## 2023-08-02 PROCEDURE — 99207 PR POST PARTUM EXAM: CPT | Performed by: FAMILY MEDICINE

## 2023-08-02 ASSESSMENT — EDINBURGH POSTNATAL DEPRESSION SCALE (EPDS)
THE THOUGHT OF HARMING MYSELF HAS OCCURRED TO ME: NEVER
I HAVE BEEN SO UNHAPPY THAT I HAVE HAD DIFFICULTY SLEEPING: NOT AT ALL
I HAVE BEEN SO UNHAPPY THAT I HAVE BEEN CRYING: NO, NEVER
I HAVE BEEN ANXIOUS OR WORRIED FOR NO GOOD REASON: NO, NOT AT ALL
I HAVE BLAMED MYSELF UNNECESSARILY WHEN THINGS WENT WRONG: NO, NEVER
I HAVE LOOKED FORWARD WITH ENJOYMENT TO THINGS: AS MUCH AS I EVER DID
I HAVE BEEN ABLE TO LAUGH AND SEE THE FUNNY SIDE OF THINGS: AS MUCH AS I ALWAYS COULD
THINGS HAVE BEEN GETTING ON TOP OF ME: NO, I HAVE BEEN COPING AS WELL AS EVER
I HAVE FELT SAD OR MISERABLE: NO, NOT AT ALL
I HAVE FELT SCARED OR PANICKY FOR NO GOOD REASON: NO, NOT AT ALL
TOTAL SCORE: 0

## 2023-08-02 NOTE — PROGRESS NOTES
Post Partum Check:    Delivery at 39w4d now .  Date of delivery: 23    Patient concerns: none    Bleeding: no concerns    Pain: no concerns    LMP:Patient's last menstrual period was 2022 (exact date).     Depression: no concerns  EPDS Score: 0    Contraception Plan:  Not sure  Doesn't want nexplanon (weight gain)  Cannot remember to take ocp.  Doesn't want nuvaring.  Patch maybe   More interested in IUD.  Long discussion, hormone vs copper.  She will call to schedule or     Immunizations needed:  Declines covid    Pap smear:  Due     Assessment:  Well post partum check up.    Plan:  RTC for IUD or call for patch rx

## 2023-09-28 ENCOUNTER — OFFICE VISIT (OUTPATIENT)
Dept: FAMILY MEDICINE | Facility: CLINIC | Age: 23
End: 2023-09-28
Payer: COMMERCIAL

## 2023-09-28 VITALS
DIASTOLIC BLOOD PRESSURE: 79 MMHG | OXYGEN SATURATION: 99 % | BODY MASS INDEX: 22.67 KG/M2 | HEART RATE: 78 BPM | WEIGHT: 120 LBS | TEMPERATURE: 98.2 F | SYSTOLIC BLOOD PRESSURE: 109 MMHG | RESPIRATION RATE: 16 BRPM

## 2023-09-28 DIAGNOSIS — H69.91 DYSFUNCTION OF RIGHT EUSTACHIAN TUBE: Primary | ICD-10-CM

## 2023-09-28 PROCEDURE — 99213 OFFICE O/P EST LOW 20 MIN: CPT | Performed by: STUDENT IN AN ORGANIZED HEALTH CARE EDUCATION/TRAINING PROGRAM

## 2023-09-28 RX ORDER — PSEUDOEPHEDRINE HCL 120 MG/1
120 TABLET, FILM COATED, EXTENDED RELEASE ORAL EVERY 12 HOURS
Qty: 6 TABLET | Refills: 0 | Status: SHIPPED | OUTPATIENT
Start: 2023-09-28 | End: 2023-10-01

## 2023-09-28 RX ORDER — PREDNISONE 20 MG/1
40 TABLET ORAL DAILY
Qty: 10 TABLET | Refills: 0 | Status: SHIPPED | OUTPATIENT
Start: 2023-09-28 | End: 2023-10-03

## 2023-09-28 NOTE — PATIENT INSTRUCTIONS
Sudafed (pseudoephedrine) - ask at the pharmacy counter to get this. You will have to show your license or ID.    Take Sudafed for up to 3 days and if not effective start Prednisone prescription once daily in the morning for 5 days.    If symptoms are still not improving after trying both of these, come back to urgent care.    Tierra Medrano, CNP

## 2023-09-28 NOTE — PROGRESS NOTES
Assessment & Plan     Dysfunction of right eustachian tube  Patient has symptoms of eustachian tube dysfunction of the right ear without recent or past history of allergies or viral symptoms.  I recommended she use Sudafed for 3 days and if no improvement in symptoms start prescription of prednisone burst.  Discussed common side effects of prednisone and use.  Follow-up if symptoms persist or worsen.  - predniSONE (DELTASONE) 20 MG tablet  Dispense: 10 tablet; Refill: 0  - pseudoePHEDrine (SUDAFED) 120 MG 12 hr tablet  Dispense: 6 tablet; Refill: 0         No follow-ups on file.    Radha Medrano, DI CNP  [facility] SHAWN Suggs is a 23 year old female who presents to clinic today for the following health issues:  Chief Complaint   Patient presents with    Ear Problem     Rt ear plugged x 3 day.     HPI    Uses q tips.  No recent or past history of allergies.  No current symptoms of viral upper respiratory infection, nasal congestion.      Review of Systems  Constitutional, HEENT, cardiovascular, pulmonary, GI, , musculoskeletal, neuro, skin, endocrine and psych systems are negative, except as otherwise noted.      Objective    /79   Pulse 78   Temp 98.2  F (36.8  C) (Oral)   Resp 16   Wt 54.4 kg (120 lb)   SpO2 99%   BMI 22.67 kg/m    Physical Exam   GENERAL: healthy, alert and no distress  EYES: Eyes grossly normal to inspection, PERRL and conjunctivae and sclerae normal  HENT: normal cephalic/atraumatic, right ear: clear effusion and bulging membrane, left ear: normal: no effusions, no erythema, normal landmarks, nose and mouth without ulcers or lesions, and oral mucous membranes moist  NECK: no adenopathy, no asymmetry, masses, or scars and thyroid normal to palpation  SKIN: no suspicious lesions or rashes

## 2023-11-06 ENCOUNTER — OFFICE VISIT (OUTPATIENT)
Dept: FAMILY MEDICINE | Facility: CLINIC | Age: 23
End: 2023-11-06
Payer: COMMERCIAL

## 2023-11-06 VITALS
TEMPERATURE: 98.3 F | WEIGHT: 126 LBS | SYSTOLIC BLOOD PRESSURE: 102 MMHG | BODY MASS INDEX: 23.79 KG/M2 | OXYGEN SATURATION: 100 % | HEIGHT: 61 IN | HEART RATE: 80 BPM | DIASTOLIC BLOOD PRESSURE: 66 MMHG | RESPIRATION RATE: 16 BRPM

## 2023-11-06 DIAGNOSIS — Z30.017 NEXPLANON INSERTION: ICD-10-CM

## 2023-11-06 DIAGNOSIS — Z32.00 POSSIBLE PREGNANCY: Primary | ICD-10-CM

## 2023-11-06 DIAGNOSIS — Z11.3 SCREENING FOR STDS (SEXUALLY TRANSMITTED DISEASES): ICD-10-CM

## 2023-11-06 DIAGNOSIS — Z13.9 ENCOUNTER FOR SCREENING INVOLVING SOCIAL DETERMINANTS OF HEALTH (SDOH): ICD-10-CM

## 2023-11-06 DIAGNOSIS — Z30.017 INSERTION OF IMPLANTABLE SUBDERMAL CONTRACEPTIVE: ICD-10-CM

## 2023-11-06 PROBLEM — O99.891 BACTERIURIA IN PREGNANCY: Status: RESOLVED | Noted: 2022-11-14 | Resolved: 2023-11-06

## 2023-11-06 PROBLEM — O99.019 ANTEPARTUM ANEMIA: Status: RESOLVED | Noted: 2019-11-10 | Resolved: 2023-11-06

## 2023-11-06 PROBLEM — R82.71 BACTERIURIA IN PREGNANCY: Status: RESOLVED | Noted: 2022-11-14 | Resolved: 2023-11-06

## 2023-11-06 LAB — HCG UR QL: NEGATIVE

## 2023-11-06 PROCEDURE — 11981 INSERTION DRUG DLVR IMPLANT: CPT | Performed by: PHYSICIAN ASSISTANT

## 2023-11-06 PROCEDURE — 81025 URINE PREGNANCY TEST: CPT | Performed by: PHYSICIAN ASSISTANT

## 2023-11-06 NOTE — PROGRESS NOTES
"Subjective:    Ifeanyi Irizarry is a 23 year old female who presents for Nexplanon insertion.  Had a baby in June 2023.  Was undecided what she wanted for contraception.  She has used Nexplanon in the past.  Today, she says she wants to use Nexplanon for contraception.  No questions.  She denies any unprotected sex in the past 2 weeks.    Patient Active Problem List   Diagnosis    Hepatitis B, chronic (H)    Iron deficiency anemia secondary to inadequate dietary iron intake    Nexplanon insertion (11/6/23)       Current Outpatient Medications:     etonogestrel (NEXPLANON) 68 MG IMPL, 1 each (68 mg) by Subdermal route once, Disp: , Rfl:     Current Facility-Administered Medications:     etonogestrel (NEXPLANON) subdermal implant 68 mg, 1 each, Subdermal, Once, Kimber Penaloza PA-C      Objective:   Allergies:  Patient has no known allergies.    /66 (BP Location: Left arm, Patient Position: Sitting, Cuff Size: Adult Regular)   Pulse 80   Temp 98.3  F (36.8  C) (Temporal)   Resp 16   Ht 1.549 m (5' 1\")   Wt 57.2 kg (126 lb)   LMP 10/10/2023   SpO2 100%   BMI 23.81 kg/m    Body mass index is 23.81 kg/m .    General: Alert and oriented x 3, in no apparent distress      Procedure:  Left upper inner arm was adequately anesthetized with 2.5 cc of lidocaine with Epi.  Then, using sterile technique, Nexplanon was inserted and марина was deployed without difficulty.  Nexplanon марина was palpable subcutaneously by myself.  Insertion site was covered with a band-aid and area was wrapped with a pressure bandage.  Patient was neurovascularly intact after exam.  Appropriate wound aftercare was dicussed with patient.      Results for orders placed or performed in visit on 11/06/23   HCG qualitative urine     Status: Normal   Result Value Ref Range    hCG Urine Qualitative Negative Negative          Assessment and Plan:     1. Nexplanon insertion  Insertion Date: 11/06/23  3 Year Expiration Date: 11/06/26  Consent form was " reviewed with patient, signed, and will be scanned in to her chart.  She knows to use back-up birth control for the next 1 week.   - HCG qualitative urine  - etonogestrel (NEXPLANON) subdermal implant 68 mg  - INSERTION NON-BIODEGRADABLE DRUG DELIVERY IMPLANT      This dictation uses voice recognition software, which may contain typographical errors.

## 2023-11-06 NOTE — COMMUNITY RESOURCES LIST (ENGLISH)
11/06/2023   [facility]  N/A  For questions about this resource list or additional care needs, please contact your primary care clinic or care manager.  Phone: [phone]   Email: N/A   Address: [address] [place] [address]   Hours: N/A        Hotlines and Helplines       Hotline - Housing crisis  1  [facility] Distance: 6.8 miles      Phone/Virtual   [address] [place] [address]  Language: English  Hours: Mon - Sun Open 24 Hours   Phone: (671) 296-3341 Email: communications@[facility].org Website: https://oscs-[place].org/oursaviourshousing/     2  [facility] Distance: 8.39 miles      Phone/Virtual   [address] [place] [address]  Language: English  Hours: Mon - Sun Open 24 Hours   Phone: (924) 118-7678 Email: info@[facility].org Website: http://www.[facility].org          Housing       Coordinated Entry access point  3  [facility] - Coordinated Access to Housing and Shelter (CAHS) - Coordinated Access Distance: 2.32 miles      In-Person, Phone/Virtual   [address] [place] [address]  Language: English  Hours: Mon - Fri 8:00 AM - 4:30 PM  Fees: Free   Phone: (930) 594-6510 Website: https://www.[facility]./residents/assistance-support/assistance/housing-services-support     4  [facility] Distance: 2.77 miles      In-Person, Phone/Virtual   [address] [place] [address]  Language: English, Malay  Hours: Mon - Fri 8:30 AM - 4:30 PM  Fees: Free   Phone: (578) 500-3135 Email: info@mncare.org Website: https://www.[facility].org/locations/[facility]-clinic/     Drop-in center or day shelter  5  [facility] - [facility] Distance: 2.81 miles      In-Person   [address] [place] [address]  Language: English  Hours: Mon - Fri 10:00 AM - 6:00 PM  Fees: Free   Phone: (647) 626-5337 Email: development@znme8wwbv.org Website:  https://Noosh.org/support/youth/     6  [facility] Distance: 3.02 miles      In-Person   [address] [place] [address]  Language: English  Hours: Mon - Fri 9:00 AM - 4:00 PM  Fees: Free   Phone: (236) 616-8136 Email: ama@[employer].AFAR Website: http://Roundscapes     Housing search assistance  7  Face to [facility] - [facility] Zone Distance: 2.81 miles      In-Person, Phone/Virtual   [address] [place] [address]  Language: English  Hours: Mon - Fri 10:00 AM - 6:00 PM  Fees: Free   Phone: (681) 514-9283 Email: development@[employer].AFAR Website: https://Noosh.org/support/youth/     8  [facility] - [facility] - Mental Health Crisis Housing Search Assistance Distance: 3.14 miles      In-Person, Phone/Virtual   [address] [place] [address]  Language: English  Hours: Mon - Tue 8:00 AM - 4:30 PM , Wed 8:00 AM - 6:00 PM , Thu - Fri 8:00 AM - 4:30 PM  Fees: Free   Phone: (242) 592-5252 Email: [facility]. Website: https://www.[facility]./residents/health-medical/clinics-services/mental-health/adult-mental-health     Shelter for families  9   Distance: 13.75 miles      In-Person   [address] [place] [address]  Language: English  Hours: Mon - Fri 3:00 PM - 9:00 AM , Sat - Sun Open 24 Hours  Fees: Free   Phone: (716) 686-7644 Ext.1 Website: https://www.[facility]aint[facility]s.org/2020/07/03/emergency-family-shelter/     Shelter for individuals  10  [facility] [place] [facility] - [facility] [place] [facility] Distance: 2.71 miles      In-Person   [address] [place] [address]  Language: English  Hours: Mon - Sun 5:00 PM - 10:00 AM  Fees: Free, Self Pay   Phone: (229) 146-9332 Email: info@[employer] Website: https://www.[employer].org/locations/[facility]-saint-paul/     11  [facility] Distance: 6.8 miles       In-Person   [address] [place] [address]  Language: English  Hours: Mon - Sun Open 24 Hours  Fees: Free   Phone: (957) 548-2670 Email: communications@[facility].org Website: https://[facility].org/oursaviourshousing/     Shelter for youth  12  [facility] Emergency Youth Shelter Distance: 6.02 miles      In-Person   [address] [place] [address]  Language: English  Hours: Mon - Sun Open 24 Hours  Fees: Free   Phone: (395) 880-1223 Email: info@[employer] Website: https://www.[employer].org/locations/hope-[place]/          Important Numbers & Websites       Emergency Services   911  [facility]   311  Poison Control   (938) 733-4464  Suicide Prevention Lifeline   (573) 342-2923 (TALK)  Child Abuse Hotline   (610) 438-3086 (4-A-Child)  Sexual Assault Hotline   (919) 289-8491 (HOPE)  National Runaway Safeline   (790) 833-2705 (RUNAWAY)  All-Options Talkline   (465) 294-9877  Substance Abuse Referral   (735) 898-5703 (HELP)

## 2023-11-06 NOTE — COMMUNITY RESOURCES LIST (ENGLISH)
11/06/2023   [facility] Outpatient Clinics  N/A  For additional resource needs, please contact your health insurance member services or your primary care team.  Phone: [phone]   Email: N/A   Address: [facility] [place] [address]   Hours: N/A        Hotlines and Helplines       Hotline - Housing crisis  1  [facility] Distance: 6.8 miles      Phone/Virtual   [address] [place] [address]  Language: English  Hours: Mon - Sun Open 24 Hours   Phone: (313) 512-1588 Email: communications@oscs-mn.org Website: https://oscs-mn.org/oursaviourshousing/     2  [facility] Distance: 8.39 miles      Phone/Virtual   [address] [place] [address]  Language: English  Hours: Mon - Sun Open 24 Hours   Phone: (549) 430-7178 Email: info@[facility].org Website: http://www.[employer].org          Housing       Coordinated Entry access point  3  [facility] - Coordinated Access to Housing and Shelter (CAHS) - Coordinated Access Distance: 2.32 miles      In-Person, Phone/Virtual   [address] [place] [address]  Language: English  Hours: Mon - Fri 8:00 AM - 4:30 PM  Fees: Free   Phone: (601) 912-7439 Website: https://www.[facility]./residents/assistance-support/assistance/housing-services-support     4  [facility] Distance: 2.77 miles      In-Person, Phone/Virtual   [address] [place] [address]  Language: English, Setswana  Hours: Mon - Fri 8:30 AM - 4:30 PM  Fees: Free   Phone: (590) 493-3612 Email: info@mncare.org Website: https://www.mncare.org/locations/[facility]-clinic/     Drop-in center or day shelter  5  Face to Face - [facility] Distance: 2.81 miles      In-Person   [address] [place] [address]  Language: English  Hours: Mon - Fri 10:00 AM - 6:00 PM  Fees: Free   Phone: (742) 215-9172 Email: development@[employer].org Website:  https://yrrt5pjlc.org/support/youth/     6  [facility] Distance: 3.02 miles      In-Person   [address] [place] [address]  Language: English  Hours: Mon - Fri 9:00 AM - 4:00 PM  Fees: Free   Phone: (237) 826-8828 Email: ama@[employer] Website: http://[employer]     Housing search assistance  7  [employer] - https://Ak[employer]ia/ Distance: 6.78 miles      Phone/Virtual   [address] [place] [address]  Language: English  Hours: Mon - Sun Open 24 Hours   Email: info@[employer] Website: https://Akamedia     8  [employer] - Online housing search assistance Distance: 7.92 miles      Phone/Virtual   [address] [address]  Language: English, Hmong, Polish, Chilean  Hours: Mon - Sun Open 24 Hours   Phone: (808) 208-1910 Email: info@[employer].org Website: http://www.NCR Tehchnosolutionslink.org/     Shelter for families  9  [facility] Distance: 13.75 miles      In-Person   [address] [place] [address]  Language: English  Hours: Mon - Fri 3:00 PM - 9:00 AM , Sat - Sun Open 24 Hours  Fees: Free   Phone: (610) 868-9429 Ext.1 Website: https://www.saintandrews.org/2020/07/03/emergency-family-shelter/     Shelter for individuals  10  [facility] [place] [facility] - [facility] [place] [facility] Distance: 2.71 miles      In-Person   [address] [place] [address]  Language: English  Hours: Mon - Sun 5:00 PM - 10:00 AM  Fees: Free, Self Pay   Phone: (215) 977-1858 Email: info@"[employer] Website: https://www."[employer].org/locations/[facility]-saint-paul/     11  [facility] Distance: 6.8 miles      In-Person   [address] [place] [address]  Language: English  Hours: Mon - Sun Open 24 Hours  Fees: Free   Phone: (949) 366-7750 Email: communications@oscs-mn.org Website:  https://oscs-mn.org/oursaviourshousing/     Shelter for youth  12  [facility] and [facility] - Emergency Youth Shelter Distance: 6.02 miles      In-Person   [address] [place] [address]  Language: English  Hours: Mon - Sun Open 24 Hours  Fees: Free   Phone: (465) 745-1185 Email: info@[employer] Website: https://www.[employer]/locations/hope-[place]/          Important Numbers & Websites       [address].[facility]  Poison Control   (691) 952-8312 [place]po[place].org  Suicide and Crisis Lifeline   [address] [place], [place] [address]line.org  Childhelp [place] Child Abuse Hotline   261.100.8230 Childhelphotline.org  National Sexual Assault Hotline   (502) 433-7730 (HOPE) [facility]  National Runaway Safeline   (416) 603-6816 (RUNAWAY) [address] [place] [address].org  Pregnancy & Postpartum Support [place]   Call/text 501-169-4768 Ppsupportmn.org  Substance Abuse National Helpline ([facility]   267-912-HELP (1247) Findtreatment.gov  Emergency Services   911

## 2023-11-15 ENCOUNTER — MEDICAL CORRESPONDENCE (OUTPATIENT)
Dept: HEALTH INFORMATION MANAGEMENT | Facility: CLINIC | Age: 23
End: 2023-11-15
Payer: COMMERCIAL

## 2024-03-03 ENCOUNTER — HEALTH MAINTENANCE LETTER (OUTPATIENT)
Age: 24
End: 2024-03-03

## 2024-10-25 ENCOUNTER — APPOINTMENT (OUTPATIENT)
Dept: RADIOLOGY | Facility: HOSPITAL | Age: 24
End: 2024-10-25
Attending: EMERGENCY MEDICINE
Payer: COMMERCIAL

## 2024-10-25 ENCOUNTER — TELEPHONE (OUTPATIENT)
Dept: FAMILY MEDICINE | Facility: CLINIC | Age: 24
End: 2024-10-25

## 2024-10-25 ENCOUNTER — HOSPITAL ENCOUNTER (EMERGENCY)
Facility: HOSPITAL | Age: 24
Discharge: HOME | End: 2024-10-25
Attending: EMERGENCY MEDICINE | Admitting: EMERGENCY MEDICINE
Payer: COMMERCIAL

## 2024-10-25 ENCOUNTER — OFFICE VISIT (OUTPATIENT)
Dept: URGENT CARE | Facility: URGENT CARE | Age: 24
End: 2024-10-25
Payer: COMMERCIAL

## 2024-10-25 VITALS
HEIGHT: 61 IN | DIASTOLIC BLOOD PRESSURE: 79 MMHG | BODY MASS INDEX: 25.36 KG/M2 | OXYGEN SATURATION: 95 % | RESPIRATION RATE: 18 BRPM | WEIGHT: 134.3 LBS | SYSTOLIC BLOOD PRESSURE: 112 MMHG | HEART RATE: 82 BPM | TEMPERATURE: 98 F

## 2024-10-25 VITALS
HEART RATE: 78 BPM | TEMPERATURE: 98 F | OXYGEN SATURATION: 99 % | WEIGHT: 130 LBS | RESPIRATION RATE: 20 BRPM | DIASTOLIC BLOOD PRESSURE: 75 MMHG | BODY MASS INDEX: 24.56 KG/M2 | SYSTOLIC BLOOD PRESSURE: 111 MMHG

## 2024-10-25 DIAGNOSIS — D22.9 CHANGE IN SKIN MOLE: Primary | ICD-10-CM

## 2024-10-25 DIAGNOSIS — R07.89 OTHER CHEST PAIN: ICD-10-CM

## 2024-10-25 LAB
ANION GAP SERPL CALCULATED.3IONS-SCNC: 11 MMOL/L (ref 7–15)
BASOPHILS # BLD AUTO: 0.1 10E3/UL (ref 0–0.2)
BASOPHILS NFR BLD AUTO: 1 %
BUN SERPL-MCNC: 12.5 MG/DL (ref 6–20)
CALCIUM SERPL-MCNC: 9.3 MG/DL (ref 8.8–10.4)
CHLORIDE SERPL-SCNC: 102 MMOL/L (ref 98–107)
CREAT SERPL-MCNC: 0.52 MG/DL (ref 0.51–0.95)
EGFRCR SERPLBLD CKD-EPI 2021: >90 ML/MIN/1.73M2
EOSINOPHIL # BLD AUTO: 0.2 10E3/UL (ref 0–0.7)
EOSINOPHIL NFR BLD AUTO: 2 %
ERYTHROCYTE [DISTWIDTH] IN BLOOD BY AUTOMATED COUNT: 12.5 % (ref 10–15)
GLUCOSE SERPL-MCNC: 104 MG/DL (ref 70–99)
HCO3 SERPL-SCNC: 24 MMOL/L (ref 22–29)
HCT VFR BLD AUTO: 41.7 % (ref 35–47)
HGB BLD-MCNC: 13.8 G/DL (ref 11.7–15.7)
HOLD SPECIMEN: NORMAL
IMM GRANULOCYTES # BLD: 0 10E3/UL
IMM GRANULOCYTES NFR BLD: 0 %
LYMPHOCYTES # BLD AUTO: 2.3 10E3/UL (ref 0.8–5.3)
LYMPHOCYTES NFR BLD AUTO: 31 %
MCH RBC QN AUTO: 28.3 PG (ref 26.5–33)
MCHC RBC AUTO-ENTMCNC: 33.1 G/DL (ref 31.5–36.5)
MCV RBC AUTO: 86 FL (ref 78–100)
MONOCYTES # BLD AUTO: 0.7 10E3/UL (ref 0–1.3)
MONOCYTES NFR BLD AUTO: 9 %
NEUTROPHILS # BLD AUTO: 4.3 10E3/UL (ref 1.6–8.3)
NEUTROPHILS NFR BLD AUTO: 57 %
NRBC # BLD AUTO: 0 10E3/UL
NRBC BLD AUTO-RTO: 0 /100
PLATELET # BLD AUTO: 252 10E3/UL (ref 150–450)
POTASSIUM SERPL-SCNC: 3.8 MMOL/L (ref 3.4–5.3)
RBC # BLD AUTO: 4.88 10E6/UL (ref 3.8–5.2)
SODIUM SERPL-SCNC: 137 MMOL/L (ref 135–145)
TROPONIN T SERPL HS-MCNC: <6 NG/L
WBC # BLD AUTO: 7.6 10E3/UL (ref 4–11)

## 2024-10-25 PROCEDURE — 93005 ELECTROCARDIOGRAM TRACING: CPT | Performed by: STUDENT IN AN ORGANIZED HEALTH CARE EDUCATION/TRAINING PROGRAM

## 2024-10-25 PROCEDURE — 82435 ASSAY OF BLOOD CHLORIDE: CPT | Performed by: EMERGENCY MEDICINE

## 2024-10-25 PROCEDURE — 82947 ASSAY GLUCOSE BLOOD QUANT: CPT | Performed by: EMERGENCY MEDICINE

## 2024-10-25 PROCEDURE — 71046 X-RAY EXAM CHEST 2 VIEWS: CPT

## 2024-10-25 PROCEDURE — 99285 EMERGENCY DEPT VISIT HI MDM: CPT | Mod: 25

## 2024-10-25 PROCEDURE — 85004 AUTOMATED DIFF WBC COUNT: CPT | Performed by: EMERGENCY MEDICINE

## 2024-10-25 PROCEDURE — 36415 COLL VENOUS BLD VENIPUNCTURE: CPT | Performed by: STUDENT IN AN ORGANIZED HEALTH CARE EDUCATION/TRAINING PROGRAM

## 2024-10-25 PROCEDURE — 80048 BASIC METABOLIC PNL TOTAL CA: CPT | Performed by: EMERGENCY MEDICINE

## 2024-10-25 PROCEDURE — 99213 OFFICE O/P EST LOW 20 MIN: CPT | Performed by: EMERGENCY MEDICINE

## 2024-10-25 PROCEDURE — 84484 ASSAY OF TROPONIN QUANT: CPT | Performed by: EMERGENCY MEDICINE

## 2024-10-25 PROCEDURE — 93005 ELECTROCARDIOGRAM TRACING: CPT | Performed by: EMERGENCY MEDICINE

## 2024-10-25 PROCEDURE — 85004 AUTOMATED DIFF WBC COUNT: CPT | Performed by: STUDENT IN AN ORGANIZED HEALTH CARE EDUCATION/TRAINING PROGRAM

## 2024-10-25 ASSESSMENT — COLUMBIA-SUICIDE SEVERITY RATING SCALE - C-SSRS
2. HAVE YOU ACTUALLY HAD ANY THOUGHTS OF KILLING YOURSELF IN THE PAST MONTH?: NO
1. IN THE PAST MONTH, HAVE YOU WISHED YOU WERE DEAD OR WISHED YOU COULD GO TO SLEEP AND NOT WAKE UP?: NO
6. HAVE YOU EVER DONE ANYTHING, STARTED TO DO ANYTHING, OR PREPARED TO DO ANYTHING TO END YOUR LIFE?: NO

## 2024-10-25 NOTE — ED TRIAGE NOTES
After Visit Summary   12/6/2017    David Marino    MRN: [id number]           Patient Information     Date Of Birth          1966        Visit Information        Provider Department      12/6/2017 11:00 AM SLEEP STUDY RM [facility]        Today's Diagnoses     ZARIA (obstructive sleep apnea)          Care Instructions    My home sleep study:    ______I will activate the device as shown on the video    ___X___My device is programmed to start automatically at     ______9:30 pm________ Time   on ___12/06/17___________  Day/Date    My contact number if I have problems is _______[phone]____________________________      I will watch the video before I hook it up at night: [email or web address]    In case of an emergency call 911                    Follow-ups after your visit        Your next 10 appointments already scheduled     Dec 07, 2017 10:15 AM CST   HST Drop Off with DME SCHEDULE   [facility] ([facility])    [address] [place] [id number] [phone]            Dec 13, 2017  8:00 AM CST   Return Sleep Patient with David Ellis MD   [facility] ([facility])    [address] [place] [id number] [phone]            Dec 29, 2017  9:00 AM CST   Weight Loss Visit with  Elda Diet 3, RD   [facility] [place] ([facility])    [address] [place] [address]  [address]  [facility] [id number] [phone]              Who to contact     If you have questions or need follow up information about today's clinic visit or your schedule please contact [facility] directly at [phone].  Normal or non-critical lab and imaging results will be communicated to you by MyChart, letter or phone within 4 business days after the clinic has received the  "Patient was at work this morning and about 10am started to have bilateral chest pain.  Has been constant since.  Feels tight.  Also when it first started, endorses feeling her breathing was \"heavy\".      Also feeling dizzy.      Had an episode similar to this on Monday and had not been seen at that time.         " results. If you do not hear from us within 7 days, please contact the clinic through frooly or phone. If you have a critical or abnormal lab result, we will notify you by phone as soon as possible.  Submit refill requests through frooly or call your pharmacy and they will forward the refill request to us. Please allow 3 business days for your refill to be completed.          Additional Information About Your Visit        BuzzSumoharHilosoft Information     frooly gives you secure access to your electronic health record. If you see a primary care provider, you can also send messages to your care team and make appointments. If you have questions, please call your primary care clinic.  If you do not have a primary care provider, please call [phone] and they will assist you.        Care EveryWhere ID     This is your Care EveryWhere ID. This could be used by other organizations to access your [place] medical records  [id number]         Blood Pressure from Last 3 Encounters:   11/13/17 135/87   10/18/17 (!) 135/91   08/22/17 (!) 140/92    Weight from Last 3 Encounters:   11/13/17 115.9 kg (255 lb 8 oz)   10/18/17 113.9 kg (251 lb)   08/22/17 115.5 kg (254 lb 9.6 oz)              We Performed the Following     HST-Home Sleep Apnea Test        Primary Care Provider Office Phone # Fax #    Jus Woodrow Oswald [phone] [address] [facility] [address]        Equal Access to Services     SATINDER XIE : Hadii jaye ku hadasho Soomaali, waaxda luqadaha, qaybta kaalmada adeegyada, lara moody. So [facility] [place] [phone].    ATENCIÓN: Si habla español, tiene a obrien disposición servicios gratuitos de asistencia lingüística. Llame al [phone].    We comply with applicable federal civil rights laws and [place] laws. We do not discriminate on the basis of race, color, national origin, age, disability, sex, sexual orientation, or gender identity.            Thank you!      Thank you for choosing [facility] [place], SLEEP STUDY  for your care. Our goal is always to provide you with excellent care. Hearing back from our patients is one way we can continue to improve our services. Please take a few minutes to complete the written survey that you may receive in the mail after your visit with us. Thank you!             Your Updated Medication List - Protect others around you: Learn how to safely use, store and throw away your medicines at www.disposemymeds.org.          This list is accurate as of: 12/6/17 11:09 AM.  Always use your most recent med list.                   Brand Name Dispense Instructions for use Diagnosis    buPROPion 150 MG 24 hr tablet    WELLBUTRIN XL    90 tablet    Take 1 tablet (150 mg) by mouth every morning    Adjustment disorder with depressed mood       doxycycline monohydrate 50 MG capsule     60 capsule    Take 1 capsule (50 mg) by mouth 2 times daily    Rosacea       DULoxetine 30 MG EC capsule    CYMBALTA    60 capsule    Take 1 capsule (30 mg) by mouth 2 times daily    Adjustment disorder with depressed mood       glycopyrrolate 2 MG Tabs    ROBINUL-FORTE    90 tablet    1 tab po q day    Generalized hyperhidrosis       metroNIDAZOLE 0.75 % topical gel    METROGEL    45 g    Apply topically 2 times daily    Rosacea       montelukast 10 MG tablet    SINGULAIR    90 tablet    Take 1 tablet (10 mg) by mouth At Bedtime    Environmental allergies       omeprazole 40 MG capsule    priLOSEC    90 capsule    Take 1 capsule (40 mg) by mouth daily Take 30-60 minutes before a meal.    Gastroesophageal reflux disease without esophagitis       rosuvastatin 10 MG tablet    CRESTOR    90 tablet    TAKE 1 TABLET(10 MG) BY MOUTH DAILY    Mixed hyperlipidemia       TRANSDERM-SCOP (1.5 MG) 72 hr patch   Generic drug:  scopolamine           traZODone 50 MG tablet    DESYREL    90 tablet    Take 1 tablet (50 mg) by mouth At Bedtime    Other insomnia       valsartan 320 MG tablet     DIOVAN    90 tablet    Take 1 tablet (320 mg) by mouth daily    Benign essential hypertension

## 2024-10-25 NOTE — PROGRESS NOTES
Assessment & Plan     Diagnosis:    ICD-10-CM    1. Change in skin mole  D22.9 Adult Dermatology  Referral          Medical Decision Making:  Ifeanyi Irizarry is a 24 year old female who presents for evaluation of a nevus on her lower back; she has always had this but she has noticed that seems to be changing slightly, getting a little bit bigger.  She notes it is at her belt line and does get irritated sometimes when pulling pants up.  There is no signs of infection, it is well-circumscribed, less than 6 mm, no other signs or symptoms of malignancy. Understands reasons to return sooner. Will have her follow-up with PCP and/or dermatology for biopsy.  Questions answered.    Wood Conroy PA-C  [facility] URGENT CARE    Subjective     Ifeanyi Irizarry is a 24 year old female who presents to clinic today for the following health issues:  Chief Complaint   Patient presents with    Urgent Care     Mole L side back   Pain/swollen and thinks is infected        HPI  Patient states that she has had a mole on the left low back for as long she can remember, she does feel that over the last couple years it is maybe gotten a little bit bigger, is irritated at times when she pulls her pants up.  She not had any drainage or bleeding from it.  Does think it is a little bit darker in color and change in appearance, but not by much. No fevers, rashes elsewhere, redness surrounding the mole or other concerns.     Review of Systems    See HPI    Objective      Vitals: /75   Pulse 78   Temp 98  F (36.7  C)   Resp 20   Wt 59 kg (130 lb)   SpO2 99%   BMI 24.56 kg/m        Patient Vitals for the past 24 hrs:   BP Temp Pulse Resp SpO2 Weight   10/25/24 1127 111/75 98  F (36.7  C) 78 20 99 % 59 kg (130 lb)       Vital signs reviewed by: Wood Conroy PA-C    Physical Exam   Constitutional: Patient is alert and cooperative. No acute distress.  Neurological: Alert and oriented x3.  Skin: <6mm mole to the left lower beltline.   Is not homogenous in color, appears darker at the tip of it and lighter at the base.  Grossly symmetric.  No surrounding erythema, warmth, fluctuance or areas of pointing.  Psychiatric:The patient has a normal mood and affect.     Wood Conroy PA-C, October 25, 2024

## 2024-10-25 NOTE — DISCHARGE INSTRUCTIONS
The x-ray is clear, the lungs are healthy.    Thankfully there is no injury to the heart or lungs, this was not a heart attack.    I suspect that this is due to some inflammation or strain in the muscles in the chest wall.  Please try over-the-counter Tylenol, ibuprofen as needed over the next few days.  If this is not helping then reach out to your primary care doctor for follow-up.

## 2024-10-25 NOTE — ED PROVIDER NOTES
EMERGENCY DEPARTMENT ENCOUNTER      NAME: Ifeanyi Irizarry  AGE: 24 year old female  YOB: 2000  MRN: [id number]  EVALUATION DATE & TIME: No admission date for patient encounter.    PCP: Kimber Penaloza    ED PROVIDER: Main Orantes M.D.      Chief Complaint   Patient presents with    Chest Pain         FINAL IMPRESSION:  1. Other chest pain          ED COURSE & MEDICAL DECISION MAKING:    Pertinent Labs & Imaging studies reviewed below.  All EKGs below represent my independent interpretation.   ED Course as of 10/25/24 1829   Fri Oct 25, 2024   1413 Patient is a healthy 24-year-old woman who presents with intermittent sharp chest pain that has presented on both sides of her chest intermittently over the last 24 hours.  Similar symptoms a few days ago that resolved on their own.  Here in the emergency department she is pain-free, has normal vital signs.  Denies any chance of pregnancy.  Lungs are clear on exam.  EKG reviewed, nonischemic.  Likely musculoskeletal versus GERD, but I do a chest x-ray to rule out pneumonia.   1414 EKG shows sinus rhythm with a rate of 89.  No acute ischemic ST or T wave morphology.  Normal axis, normal intervals.  When compared to prior EKG on June 24, 2023, there is no significant change.  Impression: Normal sinus rhythm, normal EKG.   1442 Chest XR,  PA & LAT  Based on my interpretation of the chest x-ray, no pneumothorax or pneumonia.  Lungs are clear.   1448 Troponin T, High Sensitivity: <6   With negative troponin, patient was provided reassurance, recommended NSAIDs as needed, primary care follow-up, pain is likely musculoskeletal.  No clinical features of PE that would raise suspicion or indicate need for D-dimer or CT PE study.    Additional ED Course Timestamps:    2:05 PM I met with the patient, obtained history, performed an initial exam, and discussed options and plan for diagnostics and treatment here in the ED.   2:55 PM We discussed plans for discharge  including supportive cares, symptomatic treatment, outpatient follow up, and reasons to return to the emergency department.    Medical Decision Making  Obtained supplemental history:Supplemental history obtained?: No  Reviewed external records: External records reviewed?: No  Care impacted by chronic illness:Other: None  Care significantly affected by social determinants of health:N/A  Did you consider but not order tests?: Work up considered but not performed and documented in chart, if applicable  Did you interpret images independently?: Independent interpretation of ECG and images noted in documentation, when applicable.  Consultation discussion with other provider: Phone conversation with consultants will be documented in the ED Course  Discharge. No recommendations on prescription strength medication(s). N/A.    Not Applicable    At the conclusion of the encounter I discussed the results of all of the tests and the disposition. The questions were answered. The patient or family acknowledged understanding and was agreeable with the care plan.       MEDICATIONS GIVEN IN THE EMERGENCY:  Medications - No data to display      NEW PRESCRIPTIONS STARTED AT TODAY'S ER VISIT  Discharge Medication List as of 10/25/2024  2:52 PM             =================================================================    HPI    Ifeanyi Irizarry is a 24 year old female who presents to this ED for evaluation of chest pain.     The patient had an episode of chest pain on Monday (10/21). The chest pain resolved, but then returned this morning (10/25). She describes the chest pain as tight and sharp in nature. It is intermittent and moves throughout her chest.  he pain is not exacerbated by moving, deep inspiration or eating. The patient is not currently experiencing this chest pain.     She otherwise denies having a cough, recent known sick contacts or any chance of pregnancy. She does not have any known medical problems and does not take daily  "medications.      VITALS:  /79   Pulse 82   Temp 98  F (36.7  C) (Temporal)   Resp 18   Ht 1.549 m (5' 1\")   Wt 60.9 kg (134 lb 4.8 oz)   SpO2 95%   BMI 25.38 kg/m      PHYSICAL EXAM    Constitutional: Well developed, well nourished. Comfortable appearing.  HENT: Atraumatic, mucous membranes moist, nose normal. Neck- Supple, gross ROM intact.   Eyes: Pupils mid-range, conjunctiva without injection, no discharge.   Respiratory: Clear to auscultation bilaterally, no respiratory distress, no wheezing, speaks full sentences easily. No cough.  Cardiovascular: Normal heart rate, regular rhythm, no murmurs.   GI: Soft, no tenderness to deep palpation in all quadrants, no masses.  Musculoskeletal: Moving all 4 extremities intentionally and without pain. No obvious deformity.  Skin: Warm, dry, no rash.  Neurologic: Alert & oriented x 3, cranial nerves grossly intact.  Psychiatric: Affect normal, cooperative.      I, Phuong Keene am serving as a scribe to document services personally performed by Dr. Main Orantes based on my observation and the provider's statements to me. I, Main Orantes MD attest that Phuong Keeen is acting in a scribe capacity, has observed my performance of the services and has documented them in accordance with my direction.    Main Orantes M.D.  Emergency Medicine  [facility] EMERGENCY DEPARTMENT  [facility] [facility] [id number] [phone]  Dept: [phone]       Main Orantes MD  10/25/24 1830       Mian Orantes MD  10/25/24 1830    "

## 2024-10-26 NOTE — TELEPHONE ENCOUNTER
She was just in the ER for chest pain.  If she is not feeling better, she should make a clinic follow up appt in clinic in the next 1-2 weeks.

## 2024-10-28 ENCOUNTER — TELEPHONE (OUTPATIENT)
Dept: DERMATOLOGY | Facility: CLINIC | Age: 24
End: 2024-10-28
Payer: COMMERCIAL

## 2024-10-28 NOTE — TELEPHONE ENCOUNTER
This encounter is being sent to inform the clinic that this patient has a referral from Wood Conroy PA-C in [facility] for the diagnoses of Change in skin mole [D22.9] and has requested that this patient be seen within 1-2 weeks.  Based on the availability of our provider(s), we are unable to accommodate this request.    Were all sites offered this patient?  Yes    Does scheduling algorithm request to schedule next available?  Patient has been scheduled for the first available opening with Radha English APRN CNP on 6/19/25.  We have informed the patient that the clinic will review their referral and reach out if a sooner appointment is medically necessary.

## 2024-10-28 NOTE — TELEPHONE ENCOUNTER
Patient declined to schedule appointment, per patient she is feeling better. If anything changes, she will call to schedule appointment. Completing task

## 2024-10-29 NOTE — TELEPHONE ENCOUNTER
Patient Contact    Attempt # 1    Was call answered?  No.  Left message on voicemail with information to call nurse back at [phone].     Nae SHEIKH RN  [facility]  [phone]

## 2024-10-30 LAB
ATRIAL RATE - MUSE: 89 BPM
DIASTOLIC BLOOD PRESSURE - MUSE: NORMAL MMHG
INTERPRETATION ECG - MUSE: NORMAL
P AXIS - MUSE: 65 DEGREES
PR INTERVAL - MUSE: 126 MS
QRS DURATION - MUSE: 74 MS
QT - MUSE: 390 MS
QTC - MUSE: 474 MS
R AXIS - MUSE: 32 DEGREES
SYSTOLIC BLOOD PRESSURE - MUSE: NORMAL MMHG
T AXIS - MUSE: 12 DEGREES
VENTRICULAR RATE- MUSE: 89 BPM

## 2024-10-31 NOTE — TELEPHONE ENCOUNTER
S/w pt and scheduled appt with Natailia Gutiérrez at  on Thursday November 14th at 8:15 am.    Nae SHEIKH RN  [facility]  [phone]

## 2024-11-14 ENCOUNTER — OFFICE VISIT (OUTPATIENT)
Dept: DERMATOLOGY | Facility: CLINIC | Age: 24
End: 2024-11-14
Payer: COMMERCIAL

## 2024-11-14 DIAGNOSIS — D48.9 NEOPLASM OF UNCERTAIN BEHAVIOR: ICD-10-CM

## 2024-11-14 NOTE — PATIENT INSTRUCTIONS
Wound Care After a Biopsy    What is a skin biopsy?  A skin biopsy allows the doctor to examine a very small piece of tissue under the microscope to determine the diagnosis and the best treatment for the skin condition. A local anesthetic (numbing medicine)  is injected with a very small needle into the skin area to be tested. A small piece of skin is taken from the area. Sometimes a suture (stitch) is used.     What are the risks of a skin biopsy?  I will experience scar, bleeding, swelling, pain, crusting and redness. I may experience incomplete removal or recurrence. Risks of this procedure are excessive bleeding, bruising, infection, nerve damage, numbness, thick (hypertrophic or keloidal) scar and non-diagnostic biopsy.    How should I care for my wound for the first 24 hours?  Keep the wound dry and covered for 24 hours  If it bleeds, hold direct pressure on the area for 15 minutes. If bleeding does not stop then go to the emergency room  Avoid strenuous exercise the first 1-2 days or as your doctor instructs you    How should I care for the wound after 24 hours?  After 24 hours, remove the bandage  You may bathe or shower as normal  If you had a scalp biopsy, you can shampoo as usual and can use shower water to clean the biopsy site daily  Clean the wound twice a day with gentle soap and water  Do not scrub, be gentle  Apply white petroleum/Vaseline after cleaning the wound with a cotton swab or a clean finger, and keep the site covered with a Bandaid /bandage. Bandages are not necessary with a scalp biopsy  If you are unable to cover the site with a Bandaid /bandage, re-apply ointment 2-3 times a day to keep the site moist. Moisture will help with healing  Avoid strenuous activity for first 1-2 days  Avoid lakes, rivers, pools, and oceans until the stitches are removed or the site is healed    How do I clean my wound?  Wash hands thoroughly with soap or use hand  before all wound care  Clean the  wound with gentle soap and water  Apply white petroleum/Vaseline  to wound after it is clean  Replace the Bandaid /bandage to keep the wound covered for the first few days or as instructed by your doctor  If you had a scalp biopsy, warm shower water to the area on a daily basis should suffice    What should I use to clean my wound?   Cotton-tipped applicators (Qtips )  White petroleum jelly (Vaseline ). Use a clean new container and use Q-tips to apply.  Bandaids   as needed  Gentle soap     How should I care for my wound long term?  Do not get your wound dirty  Keep up with wound care for one week or until the area is healed.  A small scab will form and fall off by itself when the area is completely healed. The area will be red and will become pink in color as it heals. Sun protection is very important for how your scar will turn out. Sunscreen with an SPF 30 or greater is recommended once the area is healed.  You should have some soreness but it should be mild and slowly go away over several days. Talk to your doctor about using tylenol for pain,    When should I call my doctor?  If you have increased:   Pain or swelling  Pus or drainage (clear or slightly yellow drainage is ok)  Temperature over 100F  Spreading redness or warmth around wound    When will I hear about my results?  The biopsy results can take 2 weeks to come back.  Your results will automatically release to [employer] before your provider has even reviewed them.  The clinic will call you with the results, send you a Mila message, or have you schedule a follow-up clinic or phone time to discuss the results.  Contact our clinics if you do not hear from us in 2 weeks. If further treatment is needed for a skin cancer, this will be done at either our [place] or [place] office.    Who should I call with questions?  [facility]: [phone]  [facility]: [phone]  For urgent  needs outside of business hours call the [facility] at [phone] and ask for the dermatology resident on call

## 2024-11-14 NOTE — LETTER
11/14/2024      Ifeanyi Irizarry  [address] [place] [address]      Dear Colleague,    Thank you for referring your patient, Ifeanyi Irizarry, to the [facility]. Please see a copy of my visit note below.    [facility] Dermatology Note  Encounter Date: Nov 14, 2024  Office Visit     Reviewed patients past medical history and pertinent chart review prior to patients visit today.     Dermatology Problem List:  # Neoplasm of uncertain behavior: left lower back, s/p shave biopsy 11/14/2024     Personal Hx: Negative for personal history of skin cancer.   Family Hx: Negative for family history of skin cancer.     Social Hx: works at a [employer] company  ____________________________________________    Assessment & Plan:     # Neoplasm of uncertain behavior:  left lower back.  DDx includes irritated nevus. Shave biopsy today.  Photograph obtained.  Procedure Note: Biopsy by shave technique  The risks and benefits of the procedure were described to the patient. These include but are not limited to bleeding, infection, scar, incomplete removal, and non-diagnostic biopsy. Verbal informed consent was obtained. The above site(s) was cleansed with an alcohol pad and injected with 1% lidocaine with epinephrine. Once anesthesia was obtained, a biopsy(ies) was performed with Gilette blade. The tissue(s) was placed in a labeled container(s) with formalin and sent to pathology. Hemostasis was achieved with aluminum chloride. Vaseline and a bandage were applied to the wound(s). The patient tolerated the procedure well and was given post biopsy care instructions.    Follow up: prn for new or changing lesions or new concerns    All risks, benefits and alternatives were discussed with patient.  Patient is in agreement and understands the assessment and plan.  All questions were answered.  Nataliia Gutiérrez PA-C  [facility]  _______________________________________    CC: Derm  Problem (Lesion on lower back. Present for a couple of years. Used to be flat but now has a bump under it. asymptomatic)     HPI:  Ms. Ifeanyi Irizarry is a(n) 24 year old female who presents today as a new patient for evaluation of a skin lesion involving the left lower back. This lesion has been present for as long as she can remember. However, over the past several years it has become more raised. It does itch, bleed, or cause pain. It has not otherwise grown.    Patient is otherwise feeling well, without additional skin concerns.    Physical Exam:  SKIN: Focused examination of left lower back only was performed per patient request.  - left lower back, 5 mm brown fleshy papule  - No other lesions of concern on areas examined.     Medications:  Current Outpatient Medications   Medication Sig Dispense Refill     etonogestrel (NEXPLANON) 68 MG IMPL 1 each (68 mg) by Subdermal route once (Patient not taking: Reported on 2024)       No current facility-administered medications for this visit.      Past Medical History:   Patient Active Problem List   Diagnosis     Hepatitis B, chronic (H)     Iron deficiency anemia secondary to inadequate dietary iron intake     Nexplanon insertion (23)     Past Medical History:   Diagnosis Date     Anemia affecting pregnancy 2017     Anemia during pregnancy in third trimester      Antepartum anemia      Bacteriuria in pregnancy      Chlamydia infection 2016     Hepatitis B complicating pregnancy in third trimester      History of fetal demise, not currently pregnant 2016    missed AB 18 weeks gestation      (normal spontaneous vaginal delivery)      Postpartum hemorrhage 2019     Short cervix during pregnancy in third trimester 2017     Short femur of fetus on prenatal ultrasound 2017     Supervision of other normal pregnancy, antepartum 11/10/2019       CC Wood Conroy PA-C  [address] [place] [address] on close of this encounter.        Again, thank you for allowing me to participate in the care of your patient.        Sincerely,        Cinthya Gutiérrez PA-C

## 2024-11-14 NOTE — PROGRESS NOTES
[facility] Dermatology Note  Encounter Date: Nov 14, 2024  Office Visit     Reviewed patients past medical history and pertinent chart review prior to patients visit today.     Dermatology Problem List:  # Neoplasm of uncertain behavior: left lower back, s/p shave biopsy 11/14/2024     Personal Hx: Negative for personal history of skin cancer.   Family Hx: Negative for family history of skin cancer.     Social Hx: works at a [employer] company  ____________________________________________    Assessment & Plan:     # Neoplasm of uncertain behavior:  left lower back.  DDx includes irritated nevus. Shave biopsy today.  Photograph obtained.  Procedure Note: Biopsy by shave technique  The risks and benefits of the procedure were described to the patient. These include but are not limited to bleeding, infection, scar, incomplete removal, and non-diagnostic biopsy. Verbal informed consent was obtained. The above site(s) was cleansed with an alcohol pad and injected with 1% lidocaine with epinephrine. Once anesthesia was obtained, a biopsy(ies) was performed with Gilette blade. The tissue(s) was placed in a labeled container(s) with formalin and sent to pathology. Hemostasis was achieved with aluminum chloride. Vaseline and a bandage were applied to the wound(s). The patient tolerated the procedure well and was given post biopsy care instructions.    Follow up: prn for new or changing lesions or new concerns    All risks, benefits and alternatives were discussed with patient.  Patient is in agreement and understands the assessment and plan.  All questions were answered.  Nataliia Gutiérrez PA-C  [facility]  _______________________________________    CC: Derm Problem (Lesion on lower back. Present for a couple of years. Used to be flat but now has a bump under it. asymptomatic)     HPI:  Ms. Ifeanyi Irizarry is a(n) 24 year old female who presents today as a new patient for evaluation of a skin  lesion involving the left lower back. This lesion has been present for as long as she can remember. However, over the past several years it has become more raised. It does itch, bleed, or cause pain. It has not otherwise grown.    Patient is otherwise feeling well, without additional skin concerns.    Physical Exam:  SKIN: Focused examination of left lower back only was performed per patient request.  - left lower back, 5 mm brown fleshy papule  - No other lesions of concern on areas examined.     Medications:  Current Outpatient Medications   Medication Sig Dispense Refill    etonogestrel (NEXPLANON) 68 MG IMPL 1 each (68 mg) by Subdermal route once (Patient not taking: Reported on 2024)       No current facility-administered medications for this visit.      Past Medical History:   Patient Active Problem List   Diagnosis    Hepatitis B, chronic (H)    Iron deficiency anemia secondary to inadequate dietary iron intake    Nexplanon insertion (23)     Past Medical History:   Diagnosis Date    Anemia affecting pregnancy 2017    Anemia during pregnancy in third trimester     Antepartum anemia     Bacteriuria in pregnancy     Chlamydia infection 2016    Hepatitis B complicating pregnancy in third trimester     History of fetal demise, not currently pregnant 2016    missed AB 18 weeks gestation     (normal spontaneous vaginal delivery)     Postpartum hemorrhage 2019    Short cervix during pregnancy in third trimester 2017    Short femur of fetus on prenatal ultrasound 2017    Supervision of other normal pregnancy, antepartum 11/10/2019       CC Wood Conroy PA-C  [address] [place] [address] on close of this encounter.

## 2024-11-18 LAB
PATH REPORT.COMMENTS IMP SPEC: NORMAL
PATH REPORT.COMMENTS IMP SPEC: NORMAL
PATH REPORT.FINAL DX SPEC: NORMAL
PATH REPORT.GROSS SPEC: NORMAL
PATH REPORT.MICROSCOPIC SPEC OTHER STN: NORMAL
PATH REPORT.RELEVANT HX SPEC: NORMAL

## 2025-02-24 ENCOUNTER — OFFICE VISIT (OUTPATIENT)
Dept: FAMILY MEDICINE | Facility: CLINIC | Age: 25
End: 2025-02-24
Payer: COMMERCIAL

## 2025-02-24 VITALS
OXYGEN SATURATION: 97 % | HEIGHT: 62 IN | TEMPERATURE: 98.1 F | BODY MASS INDEX: 24.66 KG/M2 | WEIGHT: 134 LBS | HEART RATE: 77 BPM | SYSTOLIC BLOOD PRESSURE: 103 MMHG | DIASTOLIC BLOOD PRESSURE: 65 MMHG | RESPIRATION RATE: 20 BRPM

## 2025-02-24 DIAGNOSIS — Z30.46 NEXPLANON REMOVAL: Primary | ICD-10-CM

## 2025-02-24 DIAGNOSIS — Z30.46 ENCOUNTER FOR NEXPLANON REMOVAL: ICD-10-CM

## 2025-02-24 PROBLEM — Z30.017 NEXPLANON INSERTION: Status: RESOLVED | Noted: 2023-11-06 | Resolved: 2025-02-24

## 2025-02-24 PROCEDURE — 11982 REMOVE DRUG IMPLANT DEVICE: CPT | Performed by: PHYSICIAN ASSISTANT

## 2025-02-24 RX ORDER — LIDOCAINE HYDROCHLORIDE AND EPINEPHRINE 10; 10 MG/ML; UG/ML
2.5 INJECTION, SOLUTION INFILTRATION; PERINEURAL ONCE
Status: COMPLETED | OUTPATIENT
Start: 2025-02-24 | End: 2025-02-24

## 2025-02-24 RX ADMIN — LIDOCAINE HYDROCHLORIDE AND EPINEPHRINE 2.5 ML: 10; 10 INJECTION, SOLUTION INFILTRATION; PERINEURAL at 11:05

## 2025-02-24 NOTE — PROGRESS NOTES
"Subjective:    Ifeanyi Irizarry is a 25 year old female who presents for follow-up.  Nexplanon was placed 11/6/2023.  She would like Nexplanon removed today, as she is planning for pregnancy.  She has no questions, and would like to go ahead with procedure.      Patient Active Problem List   Diagnosis    Hepatitis B, chronic (H)    Iron deficiency anemia secondary to inadequate dietary iron intake    Nexplanon insertion (11/6/23)       Current Outpatient Medications:     etonogestrel (NEXPLANON) 68 MG IMPL, 1 each (68 mg) by Subdermal route once (Patient not taking: Reported on 11/14/2024), Disp: , Rfl:       Objective:   Allergies:  Patient has no known allergies.    /65 (BP Location: Left arm, Patient Position: Sitting, Cuff Size: Adult Regular)   Pulse 77   Temp 98.1  F (36.7  C) (Oral)   Resp 20   Ht 1.565 m (5' 1.61\")   Wt 60.8 kg (134 lb)   SpO2 97%   BMI 24.82 kg/m    Body mass index is 24.82 kg/m .    General: Alert and oriented x 3, in no apparent distress      Procedure:  Left upper inner arm was adequately anesthetized with 2.5 cc of lidocaine with Epi.  Then, using sterile technique, 5 mm incision was made with an 11 blade.  Nexplanon was palpated subcutaneously and removed with a hemostat clamp.  Incision site was closed with steri-strips and wrapped with a pressure bandage.  Patient was neurovascularly intact after exam.  Appropriate wound aftercare was dicussed with patient.         Assessment and Plan:   1. Nexplanon removal (Primary)  Nexplanon removed today.  She declines other offers of birth control.  She will be trying for pregnancy.  She is aware that it is possible to become pregnant as soon as Nexplanon is removed.  - REMOVAL NEXPLANON  - lidocaine 1% with EPINEPHrine 1:100,000 injection 2.5 mL      This dictation uses voice recognition software, which may contain typographical errors.    "

## 2025-03-15 ENCOUNTER — HEALTH MAINTENANCE LETTER (OUTPATIENT)
Age: 25
End: 2025-03-15

## 2025-04-07 ENCOUNTER — TELEPHONE (OUTPATIENT)
Dept: FAMILY MEDICINE | Facility: CLINIC | Age: 25
End: 2025-04-07
Payer: COMMERCIAL

## 2025-04-07 NOTE — TELEPHONE ENCOUNTER
Forms/Letter Request    Type of form/letter: [facility]      Is Release of Information needed?: Yes  Was an GAVIN obtained?  Yes    Do we have the form/letter: Yes: PCP in basket    Who is the form from? [employer] (if other please explain)    Where did/will the form come from? form was faxed in    When is form/letter needed by: ASAP    How would you like the form/letter returned: Fax : [phone]    Patient Notified form requests are processed in 5-7 business days:No    Could we send this information to you in [employer] or would you prefer to receive a phone call?:   Patient would prefer a phone call     Okay to leave a detailed message?: Yes at Home number on file [phone] (home)

## 2025-04-07 NOTE — TELEPHONE ENCOUNTER
Completed and in my out box.    Pt is apparently pregnant.  Can you please get this message to whoever schedules the Preg Intakes?

## 2025-04-08 NOTE — TELEPHONE ENCOUNTER
Form is completed by provider, form faxed to [phone]. Form sent to HIM for scan, completing task and closing encounter.  Paul Quintero

## 2025-04-13 LAB
ABO + RH BLD: NORMAL
BLD GP AB SCN SERPL QL: NEGATIVE
SPECIMEN EXP DATE BLD: NORMAL

## 2025-04-14 ENCOUNTER — PRENATAL OFFICE VISIT (OUTPATIENT)
Dept: FAMILY MEDICINE | Facility: CLINIC | Age: 25
End: 2025-04-14
Payer: COMMERCIAL

## 2025-04-14 VITALS
DIASTOLIC BLOOD PRESSURE: 64 MMHG | SYSTOLIC BLOOD PRESSURE: 97 MMHG | HEIGHT: 62 IN | BODY MASS INDEX: 25.44 KG/M2 | TEMPERATURE: 97.4 F | OXYGEN SATURATION: 97 % | HEART RATE: 93 BPM | WEIGHT: 138.25 LBS

## 2025-04-14 DIAGNOSIS — Z34.80 PRENATAL CARE, SUBSEQUENT PREGNANCY, UNSPECIFIED TRIMESTER: Primary | ICD-10-CM

## 2025-04-14 DIAGNOSIS — B18.1 HEPATITIS B, CHRONIC (H): ICD-10-CM

## 2025-04-14 DIAGNOSIS — N92.6 MISSED MENSES: ICD-10-CM

## 2025-04-14 PROBLEM — D50.8 IRON DEFICIENCY ANEMIA SECONDARY TO INADEQUATE DIETARY IRON INTAKE: Status: RESOLVED | Noted: 2023-05-23 | Resolved: 2025-04-14

## 2025-04-14 LAB
ALBUMIN UR-MCNC: NEGATIVE MG/DL
ERYTHROCYTE [DISTWIDTH] IN BLOOD BY AUTOMATED COUNT: 12.9 % (ref 10–15)
EST. AVERAGE GLUCOSE BLD GHB EST-MCNC: 105 MG/DL
GLUCOSE UR STRIP-MCNC: NEGATIVE MG/DL
HBA1C MFR BLD: 5.3 % (ref 0–5.6)
HBV SURFACE AG SERPL QL IA: REACTIVE
HCG UR QL: POSITIVE
HCT VFR BLD AUTO: 37.9 % (ref 35–47)
HCV AB SERPL QL IA: NONREACTIVE
HGB BLD-MCNC: 12.8 G/DL (ref 11.7–15.7)
HIV 1+2 AB+HIV1 P24 AG SERPL QL IA: NONREACTIVE
KETONES UR STRIP-MCNC: NEGATIVE MG/DL
MCH RBC QN AUTO: 28.7 PG (ref 26.5–33)
MCHC RBC AUTO-ENTMCNC: 33.8 G/DL (ref 31.5–36.5)
MCV RBC AUTO: 85 FL (ref 78–100)
PLATELET # BLD AUTO: 238 10E3/UL (ref 150–450)
RBC # BLD AUTO: 4.46 10E6/UL (ref 3.8–5.2)
RUBV IGG SERPL QL IA: 5.25 INDEX
RUBV IGG SERPL QL IA: POSITIVE
T PALLIDUM AB SER QL: NONREACTIVE
VZV IGG SER QL IA: 10 S/CO
VZV IGG SER QL IA: POSITIVE
WBC # BLD AUTO: 7.9 10E3/UL (ref 4–11)

## 2025-04-14 PROCEDURE — 86803 HEPATITIS C AB TEST: CPT

## 2025-04-14 PROCEDURE — 86901 BLOOD TYPING SEROLOGIC RH(D): CPT

## 2025-04-14 PROCEDURE — 99207 PR NO CHARGE NURSE ONLY: CPT

## 2025-04-14 PROCEDURE — 86762 RUBELLA ANTIBODY: CPT

## 2025-04-14 PROCEDURE — 86850 RBC ANTIBODY SCREEN: CPT

## 2025-04-14 PROCEDURE — 83036 HEMOGLOBIN GLYCOSYLATED A1C: CPT

## 2025-04-14 PROCEDURE — 86787 VARICELLA-ZOSTER ANTIBODY: CPT

## 2025-04-14 PROCEDURE — 86780 TREPONEMA PALLIDUM: CPT

## 2025-04-14 PROCEDURE — 81003 URINALYSIS AUTO W/O SCOPE: CPT

## 2025-04-14 PROCEDURE — 83655 ASSAY OF LEAD: CPT | Mod: 90

## 2025-04-14 PROCEDURE — 81025 URINE PREGNANCY TEST: CPT

## 2025-04-14 PROCEDURE — 87341 HEP B SURFACE AG NEUTRLZJ IA: CPT

## 2025-04-14 PROCEDURE — 36415 COLL VENOUS BLD VENIPUNCTURE: CPT

## 2025-04-14 PROCEDURE — 84450 TRANSFERASE (AST) (SGOT): CPT

## 2025-04-14 PROCEDURE — 87389 HIV-1 AG W/HIV-1&-2 AB AG IA: CPT

## 2025-04-14 PROCEDURE — 87491 CHLMYD TRACH DNA AMP PROBE: CPT

## 2025-04-14 PROCEDURE — 3078F DIAST BP <80 MM HG: CPT

## 2025-04-14 PROCEDURE — 99000 SPECIMEN HANDLING OFFICE-LAB: CPT

## 2025-04-14 PROCEDURE — 87086 URINE CULTURE/COLONY COUNT: CPT

## 2025-04-14 PROCEDURE — 3074F SYST BP LT 130 MM HG: CPT

## 2025-04-14 PROCEDURE — 87591 N.GONORRHOEAE DNA AMP PROB: CPT

## 2025-04-14 PROCEDURE — 85027 COMPLETE CBC AUTOMATED: CPT

## 2025-04-14 PROCEDURE — 87340 HEPATITIS B SURFACE AG IA: CPT

## 2025-04-14 PROCEDURE — 86900 BLOOD TYPING SEROLOGIC ABO: CPT

## 2025-04-14 PROCEDURE — 84460 ALANINE AMINO (ALT) (SGPT): CPT

## 2025-04-14 RX ORDER — PRENATAL VIT/IRON FUM/FOLIC AC 27MG-0.8MG
1 TABLET ORAL DAILY
Qty: 90 TABLET | Refills: 3 | Status: SHIPPED | OUTPATIENT
Start: 2025-04-14

## 2025-04-14 NOTE — PATIENT INSTRUCTIONS
Learning About Pregnancy  Your Care Instructions     Your health in the early weeks of your pregnancy is particularly important for your baby's health. Take good care of yourself. Anything you do that harms your body can also harm your baby.  Make sure to go to all of your doctor appointments. Regular checkups will help keep you and your baby healthy.  How can you care for yourself at home?  Diet    Choose healthy foods like fruits, vegetables, whole grains, lean proteins, and healthy fats.     Choose foods that are good sources of calcium, iron, and folate. You can try dairy products, dark leafy greens, fortified orange juice and cereals, almonds, broccoli, dried fruit, and beans.     Do not skip meals or go for many hours without eating. If you are nauseated, try to eat a small, healthy snack every 2 to 3 hours.     Avoid fish that are high in mercury. These include shark, swordfish, piero mackerel, marlin, orange roughy, and bigeye tuna, as well as tilefish from the [facility].     It's okay to eat up to 8 to 12 ounces a week of fish that are low in mercury or up to 4 ounces a week of fish that have medium levels of mercury. Some fish that are low in mercury are salmon, shrimp, canned light tuna, cod, and tilapia. Some fish that have medium levels of mercury are halibut and white albacore tuna.     Drink plenty of fluids. If you have kidney, heart, or liver disease and have to limit fluids, talk with your doctor before you increase the amount of fluids you drink.     Limit caffeine to about 200 to 300 mg per day. On average, a cup of brewed coffee has around 80 to 100 mg of caffeine.     Do not drink alcohol, such as beer, wine, or hard liquor.     Take a multivitamin that contains at least 400 micrograms (mcg) of folic acid to help prevent birth defects. Fortified cereal and whole wheat bread are good additional sources of folic acid.     Increase the calcium in your diet. Try to drink a quart of skim milk  each day. You may also take calcium supplements and choose foods such as cheese and yogurt.   Lifestyle    Make sure you go to your follow-up appointments.     Get plenty of rest. You may be unusually tired while you are pregnant.     Get at least 30 minutes of exercise on most days of the week. Walking is a good choice. If you have not exercised in the past, start out slowly. Take several short walks each day.     Do not smoke. If you need help quitting, talk to your doctor about stop-smoking programs. These can increase your chances of quitting for good.     Do not touch cat feces or litter boxes. Also, wash your hands after you handle raw meat, and fully cook all meat before you eat it. Wear gloves when you work in the yard or garden, and wash your hands well when you are done. Cat feces, raw or undercooked meat, and contaminated dirt can cause an infection that may harm your baby or lead to a miscarriage.     Avoid things that can make your body too hot and may be harmful to your baby, such as a hot tub or sauna. Or talk with your doctor before doing anything that raises your body temperature. Your doctor can tell you if it's safe.     Avoid chemical fumes, paint fumes, or poisons.     Do not use illegal drugs, marijuana, or alcohol.   Medicines    Review all of your medicines with your doctor. Some of your routine medicines may need to be changed to protect your baby.     Use acetaminophen (Tylenol) to relieve minor problems, such as a mild headache or backache or a mild fever with cold symptoms. Do not use nonsteroidal anti-inflammatory drugs (NSAIDs), such as ibuprofen (Advil, Motrin) or naproxen (Aleve), unless your doctor says it is okay.     Do not take two or more pain medicines at the same time unless the doctor told you to. Many pain medicines have acetaminophen, which is Tylenol. Too much acetaminophen (Tylenol) can be harmful.     Take your medicines exactly as prescribed. Call your doctor if you  "think you are having a problem with your medicine.   To manage morning sickness    Keep food in your stomach, but not too much at once. Try eating five or six small meals a day instead of three large meals.     For nausea when you wake up, eat a small snack, such as a couple of crackers or pretzels, before rising. Allow a few minutes for your stomach to settle before you slowly get up.     Try to avoid smells and foods that make you feel nauseated. High-fat or greasy foods, milk, and coffee may make nausea worse. Some foods that may be easier to tolerate include cold, spicy, sour, and salty foods.     Drink enough fluids. Water and other caffeine-free drinks are good choices.     Take your prenatal vitamins at night on a full stomach.     Try foods and drinks made with meghan. Meghan may help with nausea.     Get lots of rest. Morning sickness may be worse when you are tired.     Talk to your doctor about over-the-counter products, such as vitamin B6 or doxylamine, to help relieve symptoms.     Try a P6 acupressure wrist band. These anti-nausea wristbands help some people.   Follow-up care is a key part of your treatment and safety. Be sure to make and go to all appointments, and call your doctor if you are having problems. It's also a good idea to know your test results and keep a list of the medicines you take.  Where can you learn more?  Go to https://www.[email or web address].net/patiented  Enter E868 in the search box to learn more about \"Learning About Pregnancy.\"  Current as of: April 30, 2024  Content Version: 14.4    4294-5923 [employer].   Care instructions adapted under license by your healthcare professional. If you have questions about a medical condition or this instruction, always ask your healthcare professional. [employer] disclaims any warranty or liability for your use of this information.    Weeks 6 to 10 of Your Pregnancy: Care Instructions  During these weeks of pregnancy, your " "body goes through many changes. You may start to feel different, both in your body and your emotions. Each pregnancy is different, so there's no \"right\" way to feel. These early weeks are a time to make healthy choices for you and your pregnancy.    Take a daily prenatal vitamin. Choose one with folic acid in it.   Avoid alcohol, tobacco, and drugs (including marijuana). If you need help quitting, talk to your doctor.     Drink plenty of liquids.  Be sure to drink enough water. And limit sodas, other sweetened drinks, and caffeine.     Choose foods that are good sources of calcium, iron, and folate.  You can try dairy products, dark leafy greens, fortified orange juice and cereals, almonds, broccoli, dried fruit, and beans.     Avoid foods that may be harmful.  Don't eat raw meat, deli meat, raw seafood, or raw eggs. Avoid soft cheese and unpasteurized dairy, like Brie and blue cheese. And don't eat fish that contains a lot of mercury, like shark and swordfish.     Don't touch amado litter or cat poop.  They can cause an infection that could be harmful during pregnancy.     Avoid things that can make your body too hot.  For example, avoid hot tubs and saunas.     Soothe morning sickness.  Try eating 5 or 6 small meals a day, getting some fresh air, or using amber to control symptoms.     Ask your doctor about flu and COVID-19 shots.  Getting them can help protect against infection.   Follow-up care is a key part of your treatment and safety. Be sure to make and go to all appointments, and call your doctor if you are having problems. It's also a good idea to know your test results and keep a list of the medicines you take.  Where can you learn more?  Go to https://www.Dubizzle.net/patiented  Enter G112 in the search box to learn more about \"Weeks 6 to 10 of Your Pregnancy: Care Instructions.\"  Current as of: April 30, 2024  Content Version: 14.4    4073-5251 [facility] [employer].   Care instructions adapted " under license by your healthcare professional. If you have questions about a medical condition or this instruction, always ask your healthcare professional. "[employer] disclaims any warranty or liability for your use of this information.       Pregnancy: Managing Morning Sickness (01:48)  Your health professional recommends that you watch this short online health video.  Learn how to manage morning sickness during pregnancy.   Purpose: Learn how to manage morning sickness during pregnancy.  Goal: Learn how to manage morning sickness during pregnancy.    Watch: Scan the QR code or visit the link to view video       https://hwi.[email or web address]  Current as of: April 30, 2024  Content Version: 14.4    8450-6407 "[employer].   Care instructions adapted under license by your healthcare professional. If you have questions about a medical condition or this instruction, always ask your healthcare professional. "[employer] disclaims any warranty or liability for your use of this information.    Pregnancy and Heartburn: Care Instructions  Overview     Heartburn is a common problem during pregnancy.  Heartburn happens when stomach acid backs up into the tube that carries food to the stomach. This tube is called the esophagus. Early in pregnancy, heartburn is caused by hormone changes that slow down digestion. Later on, it's also caused by the large uterus pushing up on the stomach.  Even though you can't fix the cause, there are things you can do to get relief. Treating heartburn during pregnancy focuses first on making lifestyle changes, like changing what and how you eat, and on taking medicines.  Heartburn usually improves or goes away after childbirth.  Follow-up care is a key part of your treatment and safety. Be sure to make and go to all appointments, and call your doctor if you are having problems. It's also a good idea to know your test results and keep a list of the medicines you  "take.  How can you care for yourself at home?  Eat small, frequent meals.  Avoid foods that make your symptoms worse, such as chocolate, peppermint, and spicy foods. Avoid drinks with caffeine, such as coffee, tea, and sodas.  Avoid bending over or lying down after meals.  Take a short walk after you eat.  If heartburn is a problem at night, do not eat for 2 hours before bedtime.  Take antacids like Mylanta, Maalox, Rolaids, or Tums. Do not take antacids that have sodium bicarbonate, magnesium trisilicate, or aspirin. Be careful when you take over-the-counter antacid medicines. Many of these medicines have aspirin in them. While you are pregnant, do not take aspirin or medicines that contain aspirin unless your doctor says it is okay.  If you're not getting relief, talk to your doctor. You may be able to take a stronger acid-reducing medicine.  When should you call for help?   Call your doctor now or seek immediate medical care if:    You have new or worse belly pain.     You are vomiting.   Watch closely for changes in your health, and be sure to contact your doctor if:    You have new or worse symptoms of reflux.     You are losing weight.     You have trouble or pain swallowing.     You do not get better as expected.   Where can you learn more?  Go to https://www.Genomera.net/patiented  Enter U946 in the search box to learn more about \"Pregnancy and Heartburn: Care Instructions.\"  Current as of: April 30, 2024  Content Version: 14.4    1198-0956 [employer].   Care instructions adapted under license by your healthcare professional. If you have questions about a medical condition or this instruction, always ask your healthcare professional. [employer] disclaims any warranty or liability for your use of this information.    Constipation: Care Instructions  Overview     Constipation means that you have a hard time passing stools (bowel movements). People pass stools from 3 times a day to " once every 3 days. What is normal for you may be different. Constipation may occur with pain in the rectum and cramping. The pain may get worse when you try to pass stools. Sometimes there are small amounts of bright red blood on toilet paper or the surface of stools. This is because of enlarged veins near the rectum (hemorrhoids).  A few changes in your diet and lifestyle may help you avoid ongoing constipation. Your doctor may also prescribe medicine to help loosen your stool.  Some medicines can cause constipation. These include pain medicines and antidepressants. Tell your doctor about all the medicines you take. Your doctor may want to make a medicine change to ease your symptoms.  Follow-up care is a key part of your treatment and safety. Be sure to make and go to all appointments, and call your doctor if you are having problems. It's also a good idea to know your test results and keep a list of the medicines you take.  How can you care for yourself at home?  Drink plenty of fluids. If you have kidney, heart, or liver disease and have to limit fluids, talk with your doctor before you increase the amount of fluids you drink.  Include high-fiber foods in your diet each day. These include fruits, vegetables, beans, and whole grains.  Get at least 30 minutes of exercise on most days of the week. Walking is a good choice. You also may want to do other activities, such as running, swimming, cycling, or playing tennis or team sports.  Take a fiber supplement, such as Citrucel or Metamucil, every day. Read and follow all instructions on the label.  Schedule time each day for a bowel movement. A daily routine may help. Take your time having a bowel movement, but don't sit for more than 10 minutes at a time. And don't strain too much.  Support your feet with a small step stool when you sit on the toilet. This helps flex your hips and places your pelvis in a squatting position.  Your doctor may recommend an  "over-the-counter laxative to relieve your constipation. Examples are Milk of Magnesia and MiraLax. Read and follow all instructions on the label. Do not use laxatives on a long-term basis.  When should you call for help?   Call your doctor now or seek immediate medical care if:    You have new or worse belly pain.     You have new or worse nausea or vomiting.     You have blood in your stools.   Watch closely for changes in your health, and be sure to contact your doctor if:    Your constipation is getting worse.     You do not get better as expected.   Where can you learn more?  Go to https://www.BayPackets.net/patiented  Enter P343 in the search box to learn more about \"Constipation: Care Instructions.\"  Current as of: October 19, 2024  Content Version: 14.4    0928-4315 [employer].   Care instructions adapted under license by your healthcare professional. If you have questions about a medical condition or this instruction, always ask your healthcare professional. [employer] disclaims any warranty or liability for your use of this information.    Learning About High-Iron Foods  What foods are high in iron?     The foods you eat contain nutrients, such as vitamins and minerals. Iron is a nutrient. Your body needs the right amount to stay healthy and work as it should. You can use the list below to help you make choices about which foods to eat.  Here are some foods that contain iron. They have 1 to 2 milligrams of iron per serving.  Fruits  Figs (dried), 5 figs  Vegetables  Asparagus (canned), 6 agarwal  Rick, beet, Swiss chard, or turnip greens, 1 cup  Dried peas, cooked,   cup  Seaweed, spirulina (dried),   cup  Spinach, (cooked)   cup or (raw) 1 cup  Grains  Cereals, fortified with iron, 1 cup  Grits (instant, cooked), fortified with iron,   cup  Meats and other protein foods  Beans (kidney, lima, navy, white), canned or cooked,   cup  Beef or lamb, 3 oz  Chicken giblets, 3 " "oz  Chickpeas (garbanzo beans),   cup  Liver of beef, lamb, or pork, 3 oz  Oysters (cooked), 3 oz  Sardines (canned), 3 oz  Soybeans (boiled),   cup  Tofu (firm),   cup  Work with your doctor to find out how much of this nutrient you need. Depending on your health, you may need more or less of it in your diet.  Where can you learn more?  Go to https://www.BeneChill.net/patiented  Enter R005 in the search box to learn more about \"Learning About High-Iron Foods.\"  Current as of: October 7, 2024  Content Version: 14.4    7846-1299 [employer].   Care instructions adapted under license by your healthcare professional. If you have questions about a medical condition or this instruction, always ask your healthcare professional. [employer] disclaims any warranty or liability for your use of this information.    Rh Antibodies Screening During Pregnancy: About This Test  What is it?     The Rh antibodies screening test is a blood test. It checks your blood for Rh antibodies. If you have Rh-negative blood and have been exposed to Rh-positive blood, your immune system may make antibodies to attack the Rh-positive blood. When a pregnant woman has these antibodies, it is called Rh sensitization.  Why is this test done?  The Rh antibodies screening test is done during pregnancy to find out if your baby is at risk for Rh disease. This can happen if you have Rh-negative blood and your baby has Rh-positive blood. If your Rh-negative blood mixes with Rh-positive blood, your immune system will make antibodies to attack the Rh-positive blood.  During pregnancy, these antibodies could attach to the baby's red blood cells. This can cause your baby to have serious health problems. The results of this test will help your doctor know how to best care for you and your baby during your pregnancy.  How do you prepare for the test?  In general, there's nothing you have to do before this test, unless your doctor tells you " "to.  How is the test done?  A health professional uses a needle to take a blood sample, usually from the arm.  What happens after the test?  You will probably be able to go home right away. It depends on the reason for the test.  You can go back to your usual activities right away.  Follow-up care is a key part of your treatment and safety. Be sure to make and go to all appointments, and call your doctor if you are having problems. It's also a good idea to keep a list of the medicines you take. Ask your doctor when you can expect to have your test results.  Where can you learn more?  Go to https://www.Biscoot.net/patiented  Enter P722 in the search box to learn more about \"Rh Antibodies Screening During Pregnancy: About This Test.\"  Current as of: April 30, 2024  Content Version: 14.4    4602-2772 [employer].   Care instructions adapted under license by your healthcare professional. If you have questions about a medical condition or this instruction, always ask your healthcare professional. [employer] disclaims any warranty or liability for your use of this information.    Learning About Fetal Ultrasound Results  What is a fetal ultrasound?     Fetal ultrasound is a test that lets your doctor see an image of your baby. Your doctor learns information about your baby from this picture. You may find out, for example, if you are having a boy or a girl. But the main reason you have this test is to get information about your baby's health.  (You may hear your baby called a fetus. This is a common medical term for a baby that's growing in the mother's uterus.)  What kind of information can you learn from this test?  The findings of an ultrasound fall into two categories, normal and abnormal.  Normal  The fetus is the right size for its age.  The placenta is the expected size and does not cover the cervix.  There is enough amniotic fluid in the uterus.  No birth defects can be seen.  Abnormal  The " fetus is small or large for its age.  The placenta covers the cervix.  There is too much or too little amniotic fluid in the uterus.  The fetus may have a birth defect.  What does an abnormal result mean?  Abnormal seems to imply that something is wrong with your baby. But what it means is that the test has shown something the doctor wants to take a closer look at.  And that's what happens next. Your doctor will talk to you about what further test or tests you may need.  What do the results mean?  Some of the things your doctor may see on an abnormal ultrasound include:  Echogenic bowel.  The bowel looks very bright on the screen. This could mean that there's blood in the bowel. Or it could mean that something is blocking the small bowel.  Increased nuchal translucency.  The ultrasound measures the thickness at the back of the baby's neck. An increase in thickness is sometimes an early sign of Down syndrome.  Increased or decreased amniotic fluid.  The doctor will look for a reason for the level of amniotic fluid and will watch the pregnancy closely as it progresses.  Large ventricles.  Ventricles in the brain look larger than they should. Your doctor may take a closer look at the brain.  Renal pyelectasis/hydronephrosis.  The ultrasound measures the fluid around the kidney. If there is more fluid than expected, there is a chance of urinary tract or kidney problems.  Short long bones.  The ultrasound measures certain arm and leg bones. A long bone (humerus or femur) that is shorter than average could be a sign of Down syndrome.  Subchorionic hemorrhage.  An ultrasound can show bleeding under one of the membranes that surrounds the fetus. Some women don't have symptoms of bleeding. The ultrasound can find this problem when women are not bleeding from their vagina. Women who have this condition have a slightly higher chance of miscarriage.  What do you do now?  Take a deep breath, and let it out. Keep in mind that an  "abnormal finding on an ultrasound, after it's coupled with more information, may:  Turn out to be nothing.  Turn out to be something mild that won't affect the baby.  Turn out to be something more serious. But if this happens, early diagnosis helps you and your doctor plan treatment options sooner rather than later.  Your medical team is there for you. So are your family and friends. Ask questions, and get the help and support you need.  Follow-up care is a key part of your treatment and safety. Be sure to make and go to all appointments, and call your doctor if you are having problems. It's also a good idea to know your test results and keep a list of the medicines you take.  Where can you learn more?  Go to https://www.[employer]S [employer].net/patiented  Enter K451 in the search box to learn more about \"Learning About Fetal Ultrasound Results.\"  Current as of: April 30, 2024  Content Version: 14.4    1315-4917 [employer].   Care instructions adapted under license by your healthcare professional. If you have questions about a medical condition or this instruction, always ask your healthcare professional. [employer] disclaims any warranty or liability for your use of this information.    Learning About Prenatal Visits  Overview     Regular prenatal visits are very important during any pregnancy. These quick office visits may seem simple and routine. But they can help you have a safe and healthy pregnancy. Your doctor is watching for problems that can only be found through regular checkups. The visits also give you and your doctor time to build a good relationship.  After your first visit, you will most likely start on a schedule of monthly visits. In your third trimester, the visits will get more frequent. Based on your health, your age, and if you've had a normal, full-term pregnancy before, your doctor may want to see you more or less often.  At different times in your pregnancy, you will have exams " and tests. Some are routine. Others are done only when there is a chance of a problem. Everything healthy you do for your body helps you have a healthy pregnancy. Rest when you need it. Eat well, drink plenty of water, and exercise regularly.  What happens during a prenatal visit?  You will have blood pressure checks, along with urine tests. You also may have blood tests. If you need to go to the bathroom while waiting for the doctor, tell the nurse. You will be given a sample cup so your urine can be tested.  You will be weighed and have your belly measured.  Your doctor may listen to the fetal heartbeat with a special device.  At about 24 weeks, and possibly earlier in your pregnancy, your doctor will check your blood sugar (glucose tolerance test) for diabetes that can occur during pregnancy. This is gestational diabetes, which can be harmful.  You will have tests to check for infections that could harm your . These include group B streptococcus and hepatitis B.  Your doctor may do ultrasounds to check for problems. This also checks the position of the fetus. An ultrasound uses sound waves to produce a picture of the fetus.  You may get your vaccines updated.  Your doctor may ask you questions to check for signs of anxiety or depression. Tell your doctor if you feel sad, anxious, or hopeless for more than a few days.  You may have other tests at any time during your pregnancy.  Use your visits to discuss with your doctor any concerns you have.  How can you care for yourself at home?  Get plenty of rest.  Try to exercise every day, if your doctor says it is okay. If you have not exercised in the past, start out slowly. For example, you can take short walks each day.  Choose healthy foods, such as fruits, vegetables, whole grains, lean proteins, low-fat dairy, and healthy fats.  Drink plenty of fluids. Cut down on drinks with caffeine, such as coffee, tea, and cola. If you have kidney, heart, or liver  "disease and have to limit fluids, talk with your doctor before you increase the amount of fluids you drink.  Try to avoid chemical fumes, paint fumes, and poisons.  If you smoke, vape, or use alcohol, marijuana, or other drugs, quit or cut back as much as you can. Talk to your doctor if you need help quitting.  Review all of your medicines, including over-the-counter medicines and supplements, with your doctor. Some of your routine medicines may need to be changed. Do not stop or start taking any medicines without talking to your doctor first.  Follow-up care is a key part of your treatment and safety. Be sure to make and go to all appointments, and call your doctor if you are having problems. It's also a good idea to know your test results and keep a list of the medicines you take.  Where can you learn more?  Go to https://www.[employer].net/patiented  Enter J502 in the search box to learn more about \"Learning About Prenatal Visits.\"  Current as of: April 30, 2024  Content Version: 14.4    2075-6333 [employer].   Care instructions adapted under license by your healthcare professional. If you have questions about a medical condition or this instruction, always ask your healthcare professional. [employer] disclaims any warranty or liability for your use of this information.    Intimate Partner Violence: Care Instructions  Overview     If you want to save this information but don't think it is safe to take it home, see if a trusted friend can keep it for you. Plan ahead. Know who you can call for help, and memorize the phone number.   Be careful online too. Your online activity may be seen by others. Do not use your personal computer or device to read about this topic. Use a safe computer, such as one at work, a friend's home, or a library.    Intimate partner violence--a type of domestic abuse--is different from an argument now and then. It is a pattern of abuse that one person may use to " control another person's behavior. It may start with threats and name-calling. Then, it may lead to more serious acts, like pushing and slapping. The abuse also may occur in other areas. For example, the abuser may withhold money or spend a partner's money without their knowledge.  Abuse can cause serious harm. You are more likely to have a long-term health problem from the injuries and stress of living in a violent relationship. People who are sexually abused by their partners have more sexually transmitted infections and unplanned pregnancies. Anyone who is abused also faces emotional pain. Anyone can be abused in relationships. In some relationships, both people use abusive behavior.  If you are pregnant, abuse can cause problems such as poor weight gain, infections, and bleeding. Abuse during this time may increase your baby's risk of low birth weight, premature birth, and death.  Follow-up care is a key part of your treatment and safety. Be sure to make and go to all appointments, and call your doctor if you are having problems. It's also a good idea to know your test results and keep a list of the medicines you take.  How can you care for yourself at home?  If you do not have a safe place to stay, discuss this with your doctor before you leave.  Have a plan for where to go, how to leave your home, and where to stay in case of an emergency. Do not tell your partner about your plan. Contact:  The National Domestic Violence Hotline toll-free at 1-584.430.7928. They can help you find resources in your area.  Your local police department, hospital, or clinic for information about shelters and safe homes near you.  Talk to a trusted friend or neighbor, a counselor, or a marlee leader. Do not feel that you have to hide what happened.  Teach your children how to call for help in an emergency.  Be alert to warning signs, such as threats, heavy alcohol use, or drug use. This can help you avoid danger.  If you can, make  "sure that there are no guns or other weapons in your home.  When should you call for help?   Call 911 anytime you think you may need emergency care. For example, call if:    You or someone else has just been abused.     You think you or someone else is in danger of being abused.   Watch closely for changes in your health, and be sure to contact your doctor if you have any problems.  Where can you learn more?  Go to https://www.[employer].net/patiented  Enter G282 in the search box to learn more about \"Intimate Partner Violence: Care Instructions.\"  Current as of: July 31, 2024  Content Version: 14.4    0484-3972 [employer].   Care instructions adapted under license by your healthcare professional. If you have questions about a medical condition or this instruction, always ask your healthcare professional. [employer] disclaims any warranty or liability for your use of this information.    Intimate Partner Violence Safety Instructions: Care Instructions  Overview     If you want to save this information but don't think it is safe to take it home, see if a trusted friend can keep it for you. Plan ahead. Know who you can call for help, and memorize the phone number.   Be careful online too. Your online activity may be seen by others. Do not use your personal computer or device to read about this topic. Use a safe computer, such as one at work, a friend's home, or a library.    When you are abused by a spouse or partner, you can take actions to protect yourself and your children.  You can increase your safety whether you decide to stay with your spouse or partner or you decide to leave. You may want to make a safety plan and pack a bag ahead of time. This will help you leave quickly when you decide to. Remember, you cannot change your partner's actions, but you can find help for you and your children. No one deserves to be abused.  Follow-up care is a key part of your treatment and safety. Be sure " to make and go to all appointments, and call your doctor if you are having problems. It's also a good idea to know your test results and keep a list of the medicines you take.  How can you care for yourself at home?  Make a plan for your safety   If you decide to stay with your abusive spouse or partner, you can do the following to increase your safety:  Decide what works best to keep you safe in an emergency.  Know who you can call to help you in an emergency.  Decide if you will call the police if you get hurt again. If you can, agree on a signal with your children or neighbor to call the police for you if you need help. You can flash lights or hang something out of a window.  Choose a safe place to go for a short time if you need to leave home. Memorize the address and phone number.  Learn escape routes out of your home in case you need to leave in a hurry. Teach your children different ways to get out of your home quickly if they need to.  If you can, hide or lock up things that can be used as weapons (guns, knives, hammers).  Learn the number of a domestic violence shelter. Talk to the people there about how they can help.  Find out about other community resources that can help you.  Take pictures of bruises or other injuries if you can. You can also take pictures of things your abuser has broken.  Teach your children that violence is never okay. Tell them that they do not deserve to be hurt.  Pack a bag   Prepare a bag with things you will need if you leave suddenly. Leave it with a friend, a relative, or someone else you trust. You could include the following items in the bag:  A set of keys to your home and car.  Emergency phone numbers and addresses.  Money such as cash or checks. You can also ask a friend, a relative, or someone else you trust to hold money for you.  Copies of legal documents such as house and car titles or rent receipts, birth certificates, Social Security card, voter registration,  "marriage and 's licenses, and your children's health records.  Personal items you would need for a few days, such as clothes, a toothbrush, toothpaste, and any medicines you or your children need.  A favorite toy or book for your child or children.  Diapers and bottles, if you have very young children.  Pictures that show signs of abuse and violence. You may also add pictures of your abuser.  If you leave   If you decide to leave, you can take the following steps:  Go to the emergency room at a hospital if you have been hurt.  Think about asking the police to be with you as you leave. They can protect you as you leave your home.  If you decide to leave secretly, remember that activities can be tracked. Your abuser may still have access to your cell phone, email, and credit cards. It may be possible for these to be traced. Always be aware of your surroundings.  If this is an emergency, do not worry about gathering up anything. Just leave--your safety is most important.  If your abuser moves out, change the locks on the doors. If you have a security system, change the access code.  When should you call for help?   Call 911 anytime you think you may need emergency care. For example, call if:    You or someone else has just been abused.     You think you or someone else is in danger of being abused.   Watch closely for changes in your health, and be sure to contact your doctor if you have any problems.  Where can you learn more?  Go to https://www.[employer].net/patiented  Enter A752 in the search box to learn more about \"Intimate Partner Violence Safety Instructions: Care Instructions.\"  Current as of: July 31, 2024  Content Version: 14.4    8191-0317 [employer].   Care instructions adapted under license by your healthcare professional. If you have questions about a medical condition or this instruction, always ask your healthcare professional. [employer] disclaims any warranty or " liability for your use of this information.    Learning About Intimate Partner Violence  What is intimate partner violence?  Intimate partner violence is a type of domestic abuse. It's threatening, emotionally harmful, or violent behavior in a personal relationship. It can happen between past or current partners or spouses. In some relationships both people abuse each other. One partner may be more abusive. Or the abuse may be equal.  Abuse can affect people of any ethnic group, race, or Quaker. It can affect teens, adults, or the elderly. And it can happen to people of any sexual orientation, gender, or social status.  Abusers use fear, bullying, and threats to control their partners. They may control what their partners do. They may control where their partners go or who they see. They may act jealous, controlling, or possessive. These early signs of abuse may happen soon after the start of the relationship. Sometimes it can be hard to notice abuse at first. But after the relationship becomes more serious, the abuse may get worse.  If you are being abused in your relationship, it's important to get help. The abuse is not your fault. You don't have to face it alone.  Be careful  It may not be safe to take home domestic abuse information like this handout. Some people ask a trusted friend to keep it for them. It's also important to plan ahead and to memorize the phone number of places you can go for help. If you are concerned about your safety, do not use your computer, smartphone, or tablet to read about domestic abuse.   What are the types of intimate partner violence?  Abuse can happen in different ways. Each type can happen on its own or in combination with others.  Emotional abuse  Emotional abuse is a pattern of threats, insults, or controlling behavior. It includes verbal abuse. It goes beyond healthy disagreements in a relationship. It's a sign of an unhealthy relationship.  Do you feel threatened,  intimidated, or controlled?  Does your partner:  Threaten your children, other family members, or pets?  Use jokes meant to embarrass or shame you?  Call you names?  Tell you that you are a bad parent?  Threaten to take away your children?  Threaten to have you or your family members deported?  Control your access to money or other basic needs?  Control what you do, who you see or talk to, or where you go?  Another form of emotional abuse is denying that it is happening. Or the abuser may act like the abuse is no big deal or is your fault.  Sexual abuse  With sexual abuse, abusers may try to convince or force you to have sex. They may force you into sex acts you're not comfortable with. Or they may sexually assault you. Sexual abuse can happen even if you are in a committed relationship.  Physical abuse  Physical abuse means that a partner hits, kicks, or does something else to physically hurt you. Physical abuse that starts with a slap might lead to kicking, shoving, and choking over time. The abuser may also threaten to hurt or kill you.  Stalking  Stalking means that an abuser gives you attention that you do not want and that causes you fear. Examples of stalking include:  Following you.  Showing up at places where the abuser isn't invited, such as at your work or school.  Constantly calling or texting you.  What problems can  to?  Intimate partner violence can be very dangerous. It can cause serious, repeated injury. It can even lead to death.  All forms of abuse can cause long-term health problems from the stress of a violent relationship. Verbal abuse can lead to sexual and physical abuse.  Abuse causes:  Emotional pain.  Depression.  Anxiety.  Post-traumatic stress.  Sexual abuse can lead to sexually transmitted infections (such as HIV/AIDS) and unplanned pregnancy.  Pregnancy can be a very dangerous time for people in abusive relationships. Abuse can cause or increase the risk of problems during  "pregnancy. These include low weight gain, anemia, infections, and bleeding. Abuse may also increase your baby's risk of low birth weight, premature birth, and death.  It can be hard for some victims of abuse to ask for help or to leave their relationship. You may feel scared, stuck, or not sure what steps to take. But it's important not to ignore abuse. Talking to someone you trust could be the first step to ending the abuse and taking care of your own health and happiness again. There are resources available that can help keep you safe.  Where can you get help?  Talk to a trusted friend. Find a local advocacy group, or talk to your doctor about the abuse.  Contact the National Domestic Violence Hotline at 1-013-023-PNMM (1-750.145.7867) for more safety tips. They can guide you to groups in your area that can help. Or go to the National Coalition Against Domestic Violence website at www.theOpenfinance.org to learn more.  Domestic violence groups or a counselor in your area can help you make a safety plan for yourself and your children.  When to call for help  Call 911 anytime you think you may need emergency care. For example, call if:  You think that you or someone you know is in danger of being abused.  You have been hurt and can't have someone safely take you to emergency care.  You have just been abused.  A family member has just been abused.  Where can you learn more?  Go to https://www.[employer].net/patiented  Enter S665 in the search box to learn more about \"Learning About Intimate Partner Violence.\"  Current as of: July 31, 2024  Content Version: 14.4    [id number] [employer].   Care instructions adapted under license by your healthcare professional. If you have questions about a medical condition or this instruction, always ask your healthcare professional. [employer] disclaims any warranty or liability for your use of this information.    Vaginal Bleeding During Pregnancy: Care " Instructions  Overview     It's common to have some vaginal spotting when you are pregnant. In some cases, the bleeding isn't serious. And there aren't any more problems with the pregnancy.  But sometimes bleeding is a sign of a more serious problem. This is more common if the bleeding is heavy or painful. Examples of more serious problems include miscarriage, an ectopic pregnancy, and a problem with the placenta.  You may have to see your doctor again to be sure everything is okay. You may also need more tests to find the cause of the bleeding.  Home treatment may be all you need. But it depends on what is causing the bleeding. Be sure to tell your doctor if you have any new symptoms or if your symptoms get worse.  The doctor has checked you carefully, but problems can develop later. If you notice any problems or new symptoms, get medical treatment right away.  Follow-up care is a key part of your treatment and safety. Be sure to make and go to all appointments, and call your doctor if you are having problems. It's also a good idea to know your test results and keep a list of the medicines you take.  How can you care for yourself at home?  If your doctor prescribed medicines, take them exactly as directed. Call your doctor if you think you are having a problem with your medicine.  Do not have vaginal sex until your doctor says it's okay.  Do not put anything in your vagina until your doctor says it's okay.  Ask your doctor about other activities you can or can't do.  Get a lot of rest. Being pregnant can make you tired.  Do not use nonsteroidal anti-inflammatory drugs (NSAIDs), such as ibuprofen (Advil, Motrin), naproxen (Aleve), or aspirin, unless your doctor says it is okay.  When should you call for help?   Call 911 anytime you think you may need emergency care. For example, call if:    You passed out (lost consciousness).     You have severe vaginal bleeding. This means you are soaking through a pad each hour  for 2 or more hours.     You have sudden, severe pain in your belly or pelvis.   Call your doctor now or seek immediate medical care if:    You have new or worse vaginal bleeding.     You are dizzy or lightheaded, or you feel like you may faint.     You have pain in your belly, pelvis, or lower back.     You think that you are in labor.     You have a sudden release of fluid from your vagina.     You've been having regular contractions for an hour. This means that you've had at least 8 contractions within 1 hour or at least 4 contractions within 20 minutes, even after you change your position and drink fluids.     You notice that your baby has stopped moving or is moving much less than normal.   Watch closely for changes in your health, and be sure to contact your doctor if you have any problems.  Current as of: April 30, 2024  Content Version: 14.4    5156-1010 [employer].   Care instructions adapted under license by your healthcare professional. If you have questions about a medical condition or this instruction, always ask your healthcare professional. [employer] disclaims any warranty or liability for your use of this information.

## 2025-04-14 NOTE — PROGRESS NOTES
ASSESSMENT and PLAN:  Pregnancy Intake Appointment  Ifeanyi Irizarry is 25 year old and .  No LMP recorded (lmp unknown). Patient is pregnant.  Estimated Date of Delivery: Data Unavailable  She will be seeing Dr. Linares for OB care at [facility].  Screening pregnancy lab work was drawn.  Prenatal vitamin prescribed.  Problem list and current medications reviewed and updated.  Dating ultrasound ordered, scheduled 25.   Covid vaccine discussed - declined.  Flu vaccine discussed - declined.  First provider OB scheduled 25.     Patient lives with cultural spouse, Chauncey, and their 4 children: Amy (7), Angela (6), Shania (5), Tony (2).    SUBJECTIVE:     HPI:    This is a 25 year old female patient,  who presents for her first obstetrical visit.    DELROY: Unknown.  LMP is unknown - Nexplanon removed 25, reports she has not had a period.  Reports positive home pregnancy test weeks ago. Her cycles are regular.  Her last menstrual period was normal.   Since her LMP, she has experienced  nausea, emesis, fatigue, urinary frequency, and constipation).   She denies abdominal pain, loss of appetite, vaginal discharge, dysuria, pelvic pain, urinary urgency, lightheadedness, vaginal bleeding, and hemorrhoids.    Additional History: Chronic HBV. Anemia - severe with  pregnancy, received iron infusions. Postpartum hemorrhage following 19 delivery. Reports  jaundice in one of her kids - patient unsure which child.    Denies hx blood pressure problems, diabetes, fast deliveries.    Have you travelled during the pregnancy?No  Have your sexual partner(s) travelled during the pregnancy?No    HISTORY:   Planned Pregnancy: Yes  Marital Status: Culturally  - Chauncey  Occupation:   Living in Household: Spouse and Children    Past History:  Her past medical history is non-contributory.      Since her last LMP she denies use of alcohol, tobacco and street drugs.    Past  "medical, surgical, social and family history were reviewed and updated in [facility].      Current Outpatient Medications   Medication Sig Dispense Refill    Prenatal Vit-Fe Fumarate-FA (PRENATAL MULTIVITAMIN W/IRON) 27-0.8 MG tablet Take 1 tablet by mouth daily. 90 tablet 3     No current facility-administered medications for this visit.       OBJECTIVE:     EXAM:  BP 97/64 (BP Location: Right arm, Patient Position: Sitting, Cuff Size: Adult Regular)   Pulse 93   Temp 97.4  F (36.3  C) (Temporal)   Ht 1.566 m (5' 1.65\")   Wt 62.7 kg (138 lb 4 oz)   LMP  (LMP Unknown)   SpO2 97%   BMI 25.57 kg/m   Body mass index is 25.57 kg/m .    GENERAL: alert and no distress  PSYCH: mentation appears normal, affect normal/bright    ASSESSMENT/PLAN:       ICD-10-CM    1. Prenatal care, subsequent pregnancy, unspecified trimester  Z34.80 Prenatal Vit-Fe Fumarate-FA (PRENATAL MULTIVITAMIN W/IRON) 27-0.8 MG tablet     Hemoglobin A1c     Hepatitis C antibody     Chlamydia trachomatis/Neisseria gonorrhoeae by PCR - Clinic Collect     Urinalysis, OB Screen     Varicella Zoster Virus Antibody IgG     Lead Venous Blood Confirm     ABO/Rh type and screen     Hepatitis B surface antigen     CBC with platelets     HIV Antigen Antibody Combo     Rubella Antibody IgG     Treponema Abs w Reflex to RPR and Titer     Urine Culture Aerobic Bacterial     US OB < 14 Weeks Single Transabdominal     Urinalysis, OB Screen     Urine Culture Aerobic Bacterial     Hemoglobin A1c     Hepatitis C antibody     Varicella Zoster Virus Antibody IgG     Lead Venous Blood Confirm     ABO/Rh type and screen     Hepatitis B surface antigen     CBC with platelets     HIV Antigen Antibody Combo     Rubella Antibody IgG     Treponema Abs w Reflex to RPR and Titer      2. Missed menses  N92.6 HCG qualitative urine          25 year old , Unknown weeks of pregnancy with DELROY of Not found.      Counseling given:   - Follow up for 1st OB with Dr. Linares scheduled " 5/5/25.  - OB Edu provided per RN IOB SmartPhrase, including: PNV, common symptoms of pregnancy, raw meat/seafood/eggs, caffeine, Tylenol, home construction, travel, toxoplasmosis, frequency of return OB appointments.  - Provided direct number for [facility]. Encouraged patient to call with questions, concerns, or symptoms.  - Discussed anticipated MyChart response time, concerns appropriate for MyChart vs phone call.     Prenatal OB Questionnaire  Patient supplied answers from flow sheet for:  Prenatal OB Questionnaire.  Past Medical History  Have you ever recieved care for your mental health? : (Patient-Rptd) No  Have you ever been in a major accident or suffered serious trauma?: (Patient-Rptd) No  Within the last year, has anyone hit, slapped, kicked or otherwise hurt you?: (Patient-Rptd) No  In the last year, has anyone forced you to have sex when you didn't want to?: (Patient-Rptd) No    Past Medical History 2   Have you ever received a blood transfusion?: (Patient-Rptd) Unknown  Would you accept a blood transfusion if was medically recommended?: (Patient-Rptd) Yes  Does anyone in your home smoke?: (Patient-Rptd) No   Is your blood type Rh negative?: (Patient-Rptd) Unknown  Have you ever ?: (Patient-Rptd) No  Have you been hospitalized for a nonsurgical reason excluding normal delivery?: (Patient-Rptd) No  Have you ever had an abnormal pap smear?: (Patient-Rptd) No    Past Medical History (Continued)  Do you have a history of abnormalities of the uterus?: (Patient-Rptd) No  Did your mother take PACO or any other hormones when she was pregnant with you?: (Patient-Rptd) No  Do you have any other problems we have not asked about which you feel may be important to this pregnancy?: (Patient-Rptd) No       Allergies as of 4/14/2025:    Allergies as of 04/14/2025    (No Known Allergies)       Current medications are:  Current Outpatient Medications   Medication Sig Dispense Refill    Prenatal Vit-Fe Fumarate-FA  (PRENATAL MULTIVITAMIN W/IRON) 27-0.8 MG tablet Take 1 tablet by mouth daily. 90 tablet 3         Early ultrasound screening tool:    Does patient have irregular periods?  N/A  Did patient use hormonal birth control in the three months prior to positive urine pregnancy test? Yes  Is the patient breastfeeding?  No  Is the patient 10 weeks or greater at time of education visit?  No    Enedina Samayoa RN  [facility]

## 2025-04-15 DIAGNOSIS — B18.1 HEPATITIS B, CHRONIC (H): Primary | ICD-10-CM

## 2025-04-15 LAB
ALT SERPL W P-5'-P-CCNC: 23 U/L (ref 0–50)
AST SERPL W P-5'-P-CCNC: 24 U/L (ref 0–45)
BACTERIA UR CULT: NORMAL
C TRACH DNA SPEC QL PROBE+SIG AMP: NEGATIVE
N GONORRHOEA DNA SPEC QL NAA+PROBE: NEGATIVE
SPECIMEN TYPE: NORMAL

## 2025-04-16 LAB — LEAD BLDV-MCNC: <2 UG/DL

## 2025-04-24 ENCOUNTER — TELEPHONE (OUTPATIENT)
Dept: FAMILY MEDICINE | Facility: CLINIC | Age: 25
End: 2025-04-24
Payer: COMMERCIAL

## 2025-04-24 NOTE — TELEPHONE ENCOUNTER
Jojo called back. Finished what I could on orm.    Placed form in [facility] team . Appt on 5/5/25 with Dr Linares

## 2025-04-24 NOTE — TELEPHONE ENCOUNTER
Called Nano Vidales for clarification on the form send to the provider. On the top of the page it wants the following- Name of Hospital,date faxed, Person completing, phone Number and MRN..  Would the hospital be the clinic ? And the rest pertain to the clinic  Nano will be calling me back

## 2025-04-24 NOTE — TELEPHONE ENCOUNTER
Forms/Letter Request    Type of form/letter: OTHER: Jojo Vidales  Hep B Prevention program-form stating patient info and Hep b status       Do we have the form/letter: Yes:     Who is the form from? Jojo Vidales  hep B prevention program(ANN) (if other please explain)    Where did/will the form come from? form was faxed in    When is form/letter needed by: na    How would you like the form/letter returned: Fax : [phone]

## 2025-04-28 ENCOUNTER — HOSPITAL ENCOUNTER (OUTPATIENT)
Dept: ULTRASOUND IMAGING | Facility: HOSPITAL | Age: 25
Discharge: HOME | End: 2025-04-28
Attending: FAMILY MEDICINE | Admitting: RADIOLOGY
Payer: COMMERCIAL

## 2025-04-28 DIAGNOSIS — Z34.80 PRENATAL CARE, SUBSEQUENT PREGNANCY, UNSPECIFIED TRIMESTER: ICD-10-CM

## 2025-04-28 PROCEDURE — 76801 OB US < 14 WKS SINGLE FETUS: CPT

## 2025-05-05 ENCOUNTER — PRENATAL OFFICE VISIT (OUTPATIENT)
Dept: FAMILY MEDICINE | Facility: CLINIC | Age: 25
End: 2025-05-05
Payer: COMMERCIAL

## 2025-05-05 VITALS
HEIGHT: 63 IN | TEMPERATURE: 97.4 F | OXYGEN SATURATION: 96 % | WEIGHT: 135 LBS | RESPIRATION RATE: 16 BRPM | SYSTOLIC BLOOD PRESSURE: 102 MMHG | DIASTOLIC BLOOD PRESSURE: 69 MMHG | HEART RATE: 98 BPM | BODY MASS INDEX: 23.92 KG/M2

## 2025-05-05 DIAGNOSIS — Z34.81 ENCOUNTER FOR SUPERVISION OF OTHER NORMAL PREGNANCY IN FIRST TRIMESTER: Primary | ICD-10-CM

## 2025-05-05 DIAGNOSIS — B18.1 HEPATITIS B, CHRONIC (H): ICD-10-CM

## 2025-05-05 PROCEDURE — 0500F INITIAL PRENATAL CARE VISIT: CPT | Performed by: FAMILY MEDICINE

## 2025-05-05 PROCEDURE — 87517 HEPATITIS B DNA QUANT: CPT | Performed by: FAMILY MEDICINE

## 2025-05-05 PROCEDURE — 3074F SYST BP LT 130 MM HG: CPT | Performed by: FAMILY MEDICINE

## 2025-05-05 PROCEDURE — 99213 OFFICE O/P EST LOW 20 MIN: CPT | Performed by: FAMILY MEDICINE

## 2025-05-05 PROCEDURE — 3078F DIAST BP <80 MM HG: CPT | Performed by: FAMILY MEDICINE

## 2025-05-05 PROCEDURE — 36415 COLL VENOUS BLD VENIPUNCTURE: CPT | Performed by: FAMILY MEDICINE

## 2025-05-05 NOTE — PROGRESS NOTES
"Discussed screening for aneuploidy and neural tube defects. NIPT screen discussed.  She would like to proceed with NIPT.     Carrier screening for SMA, CF, thalassemia, fragile X discussed.  She declined these screens.    Reviewed intake exam, labs, DELROY, past medical history, past surgical history, family history, genetic history, and previous obstetrical history.    Preeclampsia risk factors - at increased risk if 1+ high risk or 2+ moderate risk factors  High risk factors (1+):NONE  Moderate risk factors (2+): NONE     Diabetes risk factors - at increased risk if BMI 25+ and 1+ additional risk factors    BMI: 24.71   Additional risk factors (1+): Ethnicity: , , ,  American,      The American College of Obstetrics and Gynecology recommends those with a prepregnancy BMI of  18.5-24.9 should aim for pregnancy weight gain of 25-35 pounds.  25-29.9 should aim for pregnancy weight gain of 15-25 pounds.    /69 (BP Location: Left arm, Patient Position: Sitting, Cuff Size: Adult Regular)   Pulse 98   Temp 97.4  F (36.3  C) (Temporal)   Resp 16   Ht 1.593 m (5' 2.72\")   Wt 61.2 kg (135 lb)   LMP  (LMP Unknown)   SpO2 96%   BMI 24.13 kg/m       See prenatal physical.    1. Encounter for supervision of other normal pregnancy in first trimester (Primary)  NIPT as above.  Typically, she has become anemic.  Has PNVs now.  Needed Venofer in the past.  Discussed iron in diet.  - Myriad Non-Invasive Prenatal Screening-Prequel    2. Hepatitis B, chronic (H)  Check viral load.  - Hep B Virus DNA Quant Real Time PCR     May want PPTL.  Discussed permanence.  Will follow up at future visits.    Future Appointments   Date Time Provider Department Center   6/2/2025 11:00 AM [facility]   6/30/2025  8:40 AM [facility]   7/28/2025 10:40 AM [facility]   8/25/2025 10:00 AM [facility]   9/4/2025 10:40 AM Catrachito Linares MD [facility]

## 2025-05-06 LAB — HBV DNA SERPL NAA+PROBE-ACNC: NOT DETECTED IU/ML

## 2025-05-12 LAB — SCANNED LAB RESULT: NORMAL

## 2025-05-16 ENCOUNTER — RESULTS FOLLOW-UP (OUTPATIENT)
Dept: FAMILY MEDICINE | Facility: CLINIC | Age: 25
End: 2025-05-16

## 2025-06-19 LAB
ALBUMIN SERPL BCG-MCNC: 4.1 G/DL (ref 3.5–5.2)
ALBUMIN UR-MCNC: NEGATIVE MG/DL
ALP SERPL-CCNC: 70 U/L (ref 40–150)
ALT SERPL W P-5'-P-CCNC: 19 U/L (ref 0–50)
ANION GAP SERPL CALCULATED.3IONS-SCNC: 10 MMOL/L (ref 7–15)
APPEARANCE UR: CLEAR
AST SERPL W P-5'-P-CCNC: 28 U/L (ref 0–45)
BACTERIA #/AREA URNS HPF: ABNORMAL /HPF
BASOPHILS # BLD AUTO: 0 10E3/UL (ref 0–0.2)
BASOPHILS NFR BLD AUTO: 0 %
BILIRUB SERPL-MCNC: 0.3 MG/DL
BILIRUB UR QL STRIP: NEGATIVE
BUN SERPL-MCNC: 9.1 MG/DL (ref 6–20)
CALCIUM SERPL-MCNC: 9.9 MG/DL (ref 8.8–10.4)
CHLORIDE SERPL-SCNC: 100 MMOL/L (ref 98–107)
COLOR UR AUTO: COLORLESS
CREAT SERPL-MCNC: 0.48 MG/DL (ref 0.51–0.95)
EGFRCR SERPLBLD CKD-EPI 2021: >90 ML/MIN/1.73M2
EOSINOPHIL # BLD AUTO: 0.1 10E3/UL (ref 0–0.7)
EOSINOPHIL NFR BLD AUTO: 2 %
ERYTHROCYTE [DISTWIDTH] IN BLOOD BY AUTOMATED COUNT: 13.5 % (ref 10–15)
GLUCOSE SERPL-MCNC: 91 MG/DL (ref 70–99)
GLUCOSE UR STRIP-MCNC: NEGATIVE MG/DL
HCG INTACT+B SERPL-ACNC: ABNORMAL MIU/ML
HCO3 SERPL-SCNC: 25 MMOL/L (ref 22–29)
HCT VFR BLD AUTO: 35.7 % (ref 35–47)
HGB BLD-MCNC: 11.8 G/DL (ref 11.7–15.7)
HGB UR QL STRIP: ABNORMAL
IMM GRANULOCYTES # BLD: 0.1 10E3/UL
IMM GRANULOCYTES NFR BLD: 1 %
KETONES UR STRIP-MCNC: ABNORMAL MG/DL
LEUKOCYTE ESTERASE UR QL STRIP: ABNORMAL
LYMPHOCYTES # BLD AUTO: 2.6 10E3/UL (ref 0.8–5.3)
LYMPHOCYTES NFR BLD AUTO: 31 %
MCH RBC QN AUTO: 28.5 PG (ref 26.5–33)
MCHC RBC AUTO-ENTMCNC: 33.1 G/DL (ref 31.5–36.5)
MCV RBC AUTO: 86 FL (ref 78–100)
MONOCYTES # BLD AUTO: 0.6 10E3/UL (ref 0–1.3)
MONOCYTES NFR BLD AUTO: 8 %
NEUTROPHILS # BLD AUTO: 4.9 10E3/UL (ref 1.6–8.3)
NEUTROPHILS NFR BLD AUTO: 59 %
NITRATE UR QL: NEGATIVE
NRBC # BLD AUTO: 0 10E3/UL
NRBC BLD AUTO-RTO: 0 /100
PH UR STRIP: 6.5 [PH] (ref 5–7)
PLATELET # BLD AUTO: 212 10E3/UL (ref 150–450)
POTASSIUM SERPL-SCNC: 3.7 MMOL/L (ref 3.4–5.3)
PROT SERPL-MCNC: 7.3 G/DL (ref 6.4–8.3)
RBC # BLD AUTO: 4.14 10E6/UL (ref 3.8–5.2)
RBC URINE: 1 /HPF
SODIUM SERPL-SCNC: 135 MMOL/L (ref 135–145)
SP GR UR STRIP: 1.01 (ref 1–1.03)
SQUAMOUS EPITHELIAL: 2 /HPF
UROBILINOGEN UR STRIP-MCNC: NORMAL MG/DL
WBC # BLD AUTO: 8.4 10E3/UL (ref 4–11)
WBC URINE: 1 /HPF

## 2025-06-19 PROCEDURE — 85025 COMPLETE CBC W/AUTO DIFF WBC: CPT | Performed by: STUDENT IN AN ORGANIZED HEALTH CARE EDUCATION/TRAINING PROGRAM

## 2025-06-19 PROCEDURE — 80053 COMPREHEN METABOLIC PANEL: CPT | Performed by: STUDENT IN AN ORGANIZED HEALTH CARE EDUCATION/TRAINING PROGRAM

## 2025-06-19 PROCEDURE — 81001 URINALYSIS AUTO W/SCOPE: CPT | Performed by: STUDENT IN AN ORGANIZED HEALTH CARE EDUCATION/TRAINING PROGRAM

## 2025-06-19 PROCEDURE — 36415 COLL VENOUS BLD VENIPUNCTURE: CPT | Performed by: STUDENT IN AN ORGANIZED HEALTH CARE EDUCATION/TRAINING PROGRAM

## 2025-06-19 PROCEDURE — 84702 CHORIONIC GONADOTROPIN TEST: CPT | Performed by: STUDENT IN AN ORGANIZED HEALTH CARE EDUCATION/TRAINING PROGRAM

## 2025-06-19 PROCEDURE — 99284 EMERGENCY DEPT VISIT MOD MDM: CPT | Mod: 25 | Performed by: STUDENT IN AN ORGANIZED HEALTH CARE EDUCATION/TRAINING PROGRAM

## 2025-06-19 ASSESSMENT — COLUMBIA-SUICIDE SEVERITY RATING SCALE - C-SSRS
1. IN THE PAST MONTH, HAVE YOU WISHED YOU WERE DEAD OR WISHED YOU COULD GO TO SLEEP AND NOT WAKE UP?: NO
2. HAVE YOU ACTUALLY HAD ANY THOUGHTS OF KILLING YOURSELF IN THE PAST MONTH?: NO
6. HAVE YOU EVER DONE ANYTHING, STARTED TO DO ANYTHING, OR PREPARED TO DO ANYTHING TO END YOUR LIFE?: NO

## 2025-06-20 ENCOUNTER — HOSPITAL ENCOUNTER (EMERGENCY)
Facility: HOSPITAL | Age: 25
Discharge: HOME | End: 2025-06-20
Attending: STUDENT IN AN ORGANIZED HEALTH CARE EDUCATION/TRAINING PROGRAM | Admitting: STUDENT IN AN ORGANIZED HEALTH CARE EDUCATION/TRAINING PROGRAM
Payer: COMMERCIAL

## 2025-06-20 ENCOUNTER — APPOINTMENT (OUTPATIENT)
Dept: ULTRASOUND IMAGING | Facility: HOSPITAL | Age: 25
End: 2025-06-20
Attending: STUDENT IN AN ORGANIZED HEALTH CARE EDUCATION/TRAINING PROGRAM
Payer: COMMERCIAL

## 2025-06-20 VITALS
RESPIRATION RATE: 16 BRPM | HEART RATE: 83 BPM | OXYGEN SATURATION: 97 % | SYSTOLIC BLOOD PRESSURE: 97 MMHG | TEMPERATURE: 97 F | DIASTOLIC BLOOD PRESSURE: 54 MMHG | HEIGHT: 61 IN | WEIGHT: 140.6 LBS | BODY MASS INDEX: 26.55 KG/M2

## 2025-06-20 DIAGNOSIS — R10.84 GENERALIZED ABDOMINAL PAIN: ICD-10-CM

## 2025-06-20 PROCEDURE — 258N000003 HC RX IP 258 OP 636: Performed by: FAMILY MEDICINE

## 2025-06-20 PROCEDURE — 76805 OB US >/= 14 WKS SNGL FETUS: CPT

## 2025-06-20 PROCEDURE — 96360 HYDRATION IV INFUSION INIT: CPT | Performed by: STUDENT IN AN ORGANIZED HEALTH CARE EDUCATION/TRAINING PROGRAM

## 2025-06-20 RX ORDER — CEPHALEXIN 500 MG/1
500 CAPSULE ORAL 4 TIMES DAILY
Qty: 20 CAPSULE | Refills: 0 | Status: SHIPPED | OUTPATIENT
Start: 2025-06-20 | End: 2025-06-25

## 2025-06-20 RX ADMIN — SODIUM CHLORIDE 1000 ML: 0.9 INJECTION, SOLUTION INTRAVENOUS at 01:23

## 2025-06-20 ASSESSMENT — ACTIVITIES OF DAILY LIVING (ADL)
ADLS_ACUITY_SCORE: 48

## 2025-06-20 NOTE — DISCHARGE INSTRUCTIONS
Your ultrasound today was normal.  Your baby appeared well.  Your blood work was normal.    Your urine did have some amount of bacteria.  We will treat this with a short course of antibiotics.    Please follow-up with your OB/GYN next week.  Turn for any worsening symptoms.

## 2025-06-20 NOTE — ED PROVIDER NOTES
Emergency Department Encounter         FINAL IMPRESSION:  Abd pain pregnancy          ED COURSE AND MEDICAL DECISION MAKING       ED Course as of 06/20/25 0231   Fri Jun 20, 2025   0231 Patient is a G7, P4 at 17 weeks here with a generalized nonspecific abdominal pain began yesterday.  No fevers, chills or nausea vomiting.  No dysuria.  No vaginal bleeding or discharge although patient reports she had discharge to triage, when I talked with her she stated she has no discharge.  No bowel movement changes.  No fevers.  No leg swelling.  No headaches.  Arrival she looks well.  When I saw her, patient is without any pain.  She is a gravid abdomen with no pain to deep palpation all 4 quadrants.  Normal heart and lungs.  Does have small mount of bacteria in the urine however asymptomatic.  Will treat with short course of Keflex.  Awaiting ultrasound.          - US normal, will have pt followup with OB as an outpt            Medical Decision Making      Discharge. No recommendations on prescription strength medication(s). See documentation for any additional details.    MIPS (CTPE, Dental pain, Motley, Sinusitis, Asthma/COPD, Head Trauma): Not Applicable    SEPSIS: None              At the conclusion of the encounter I discussed the results of all the tests and the disposition. The questions were answered. The patient or family acknowledged understanding and was agreeable with the care plan.        MEDICATIONS GIVEN IN THE EMERGENCY DEPARTMENT:  Medications   sodium chloride 0.9% BOLUS 1,000 mL (1,000 mLs Intravenous $New Bag 6/20/25 0123)       NEW PRESCRIPTIONS STARTED AT TODAY'S ED VISIT:  New Prescriptions    No medications on file       HPI     25-year-old G7, P4 here with generalized nonspecific abdominal pain for less than 24 hours.  No fevers, chills, nausea or vomiting.  No vaginal bleeding or discharge          MEDICAL HISTORY     Past Medical History:   Diagnosis Date    Anemia affecting pregnancy 07/07/2017     "Anemia during pregnancy in third trimester     Antepartum anemia 11/10/2019    Bacteriuria in pregnancy 2022    Chlamydia     Chlamydia infection 2016    Hepatitis B complicating pregnancy in third trimester     History of fetal demise, not currently pregnant 2016    Iron deficiency anemia secondary to inadequate dietary iron intake 2023     (normal spontaneous vaginal delivery)     Postpartum hemorrhage 2019    Short cervix during pregnancy in third trimester 2017    Short femur of fetus on prenatal ultrasound 2017    Supervision of other normal pregnancy, antepartum 11/10/2019       No past surgical history on file.    Social History     Tobacco Use    Smoking status: Never     Passive exposure: Never    Smokeless tobacco: Never    Tobacco comments:     no second hand smoke   Vaping Use    Vaping status: Never Used   Substance Use Topics    Alcohol use: No    Drug use: No       Prenatal Vit-Fe Fumarate-FA (PRENATAL MULTIVITAMIN W/IRON) 27-0.8 MG tablet            PHYSICAL EXAM     /74   Pulse 91   Temp 97  F (36.1  C)   Resp 16   Ht 1.549 m (5' 1\")   Wt 63.8 kg (140 lb 9.6 oz)   LMP  (LMP Unknown)   SpO2 99%   BMI 26.57 kg/m        PHYSICAL EXAM:     General: Patient appears well, nontoxic, comfortable  HEENT: Moist mucous membranes,  No head trauma.    Cardiovascular: Normal rate, normal rhythm, no extremity edema.  No appreciable murmur.  Respiratory: No signs of respiratory distress, lungs are clear to auscultation bilaterally with no wheezes rhonchi or rales.  Abdominal: Soft, nontender, nondistended, no palpable masses, no guarding, no rebound  Musculoskeletal: Full range of motion of joints, no deformities appreciated.  Neurological: Alert and oriented, grossly neurologically intact.  Psychological: Normal affect and mood.  Integument: No rashes appreciated          RESULTS       Labs Ordered and Resulted from Time of ED Arrival to Time of ED Departure "   COMPREHENSIVE METABOLIC PANEL - Abnormal       Result Value    Sodium 135      Potassium 3.7      Carbon Dioxide (CO2) 25      Anion Gap 10      Urea Nitrogen 9.1      Creatinine 0.48 (*)     GFR Estimate >90      Calcium 9.9      Chloride 100      Glucose 91      Alkaline Phosphatase 70      AST 28      ALT 19      Protein Total 7.3      Albumin 4.1      Bilirubin Total 0.3     ROUTINE UA WITH MICROSCOPIC REFLEX TO CULTURE - Abnormal    Color Urine Colorless      Appearance Urine Clear      Glucose Urine Negative      Bilirubin Urine Negative      Ketones Urine Trace (*)     Specific Gravity Urine 1.012      Blood Urine 0.06 mg/dL (*)     pH Urine 6.5      Protein Albumin Urine Negative      Urobilinogen Urine Normal      Nitrite Urine Negative      Leukocyte Esterase Urine 25 Pk/uL (*)     Bacteria Urine Few (*)     RBC Urine 1      WBC Urine 1      Squamous Epithelials Urine 2 (*)    HCG QUANTITATIVE PREGNANCY - Abnormal    hCG Quantitative 21,818 (*)    CBC WITH PLATELETS AND DIFFERENTIAL    WBC Count 8.4      RBC Count 4.14      Hemoglobin 11.8      Hematocrit 35.7      MCV 86      MCH 28.5      MCHC 33.1      RDW 13.5      Platelet Count 212      % Neutrophils 59      % Lymphocytes 31      % Monocytes 8      % Eosinophils 2      % Basophils 0      % Immature Granulocytes 1      NRBCs per 100 WBC 0      Absolute Neutrophils 4.9      Absolute Lymphocytes 2.6      Absolute Monocytes 0.6      Absolute Eosinophils 0.1      Absolute Basophils 0.0      Absolute Immature Granulocytes 0.1      Absolute NRBCs 0.0         US OB >= 14 Weeks   Final Result   IMPRESSION:     1.  Single intrauterine gestation.   2.  Based on prior dating, composite age of 17 weeks 3 days with EDC 11/25/2025.   3.  Normal interval growth.   4.  Survey of the fetal anatomy is [is not performed today. Recommend formalized survey of fetal anatomy at approximately 20 weeks estimated gestational age             PROCEDURES:  Procedures:  Procedures       Faraz Barker DO  Emergency Medicine  [facility] EMERGENCY DEPARTMENT       Lucero, Faraz Ordoñez DO  06/20/25 4219

## 2025-07-17 ENCOUNTER — PRENATAL OFFICE VISIT (OUTPATIENT)
Dept: FAMILY MEDICINE | Facility: CLINIC | Age: 25
End: 2025-07-17
Payer: COMMERCIAL

## 2025-07-17 VITALS
BODY MASS INDEX: 25.16 KG/M2 | RESPIRATION RATE: 18 BRPM | TEMPERATURE: 97.5 F | SYSTOLIC BLOOD PRESSURE: 100 MMHG | HEIGHT: 63 IN | HEART RATE: 92 BPM | DIASTOLIC BLOOD PRESSURE: 62 MMHG | WEIGHT: 142 LBS | OXYGEN SATURATION: 99 %

## 2025-07-17 DIAGNOSIS — Z34.82 ENCOUNTER FOR SUPERVISION OF OTHER NORMAL PREGNANCY IN SECOND TRIMESTER: Primary | ICD-10-CM

## 2025-07-17 NOTE — PROGRESS NOTES
"Subjective:  25 year old  at 21w2d  No ctx, lof, bleeding, or dc.  No HA or vision changes.    Outpatient Medications Prior to Visit   Medication Sig Dispense Refill    Prenatal Vit-Fe Fumarate-FA (PRENATAL MULTIVITAMIN W/IRON) 27-0.8 MG tablet Take 1 tablet by mouth daily. 90 tablet 3     No facility-administered medications prior to visit.      History   Smoking Status    Never   Smokeless Tobacco    Never      Objective:  /62   Pulse 92   Temp 97.5  F (36.4  C) (Temporal)   Resp 18   Ht 1.593 m (5' 2.72\")   Wt 64.4 kg (142 lb)   LMP  (LMP Unknown)   SpO2 99%   BMI 25.38 kg/m    Total weight gain: 1.701 kg (3 lb 12 oz)   GENERAL: alert, not distressed  ABDOMEN: gravid, soft, non tender, non distended    Recent Results (from the past 24 hours)   Urinalysis, OB Screen    Collection Time: 25  4:14 PM   Result Value Ref Range    Glucose Urine Negative Negative mg/dL    Ketones Urine Negative Negative mg/dL    Protein Albumin Urine Negative Negative mg/dL      Assessment and Plan:   1. Encounter for supervision of normal pregnancy in second trimester (Primary)  - Urinalysis, OB Screen     She has decided she does not want postpartum tubal ligation.    Return in 1 month (on 2025) for prenatal care.   Future Appointments   Date Time Provider Department Center   2025  4:00 PM Catrachito Linares MD [facility]   2025  4:20 PM Catrachito Linares MD [facility]   10/2/2025  4:40 PM Catrachito Linares MD [facility]   10/30/2025  4:40 PM Catrachito Linares MD [facility]   2025  4:40 PM Catrachito Linares MD [facility]   2025  4:20 PM Catrachito Linares MD [facility]   2025  4:40 PM Catrachito Linares MD [facility]       "

## 2025-07-17 NOTE — PATIENT INSTRUCTIONS
"Weeks 18 to 22 of Your Pregnancy: Care Instructions  At this stage you may find that your nausea and fatigue are gone. You may feel better overall and have more energy. But you might now also have some new discomforts, like sleep problems or leg cramps.    You may start to feel your baby move. These movements can feel like butterflies or bubbles.   Babies at this stage can now suck their thumbs.     Get some exercise every day.  And avoid caffeine late in the day.     Take a warm shower or bath before bed.  Try relaxation exercises to calm your mind and body.     Use extra pillows.  They can help you get comfortable.     Don't use sleeping pills or alcohol.  They could harm your baby.     For leg cramps, stretch and apply heat.  A warm bath, leg warmers, a heating pad, or a hot water bottle can help with muscle aches.   Stretches for leg cramps    Straighten your leg and bend your foot (flex your ankle) slowly upward, toward your knee. Bend your toes up and down.   Stand on a flat surface. Stretch your toes upward. For balance, hold on to the wall or something stable. If it feels okay, take small steps walking on your heels.   Follow-up care is a key part of your treatment and safety. Be sure to make and go to all appointments, and call your doctor if you are having problems. It's also a good idea to know your test results and keep a list of the medicines you take.  Where can you learn more?  Go to https://www.[employer]net/patiented  Enter W603 in the search box to learn more about \"Weeks 18 to 22 of Your Pregnancy: Care Instructions.\"  Current as of: April 30, 2024  Content Version: 14.5    5210-2138 [employer].   Care instructions adapted under license by your healthcare professional. If you have questions about a medical condition or this instruction, always ask your healthcare professional. [employer] disclaims any warranty or liability for your use of this information.    "

## 2025-07-21 ENCOUNTER — HOSPITAL ENCOUNTER (OUTPATIENT)
Dept: ULTRASOUND IMAGING | Facility: HOSPITAL | Age: 25
Discharge: HOME | End: 2025-07-21
Attending: FAMILY MEDICINE | Admitting: FAMILY MEDICINE
Payer: COMMERCIAL

## 2025-07-21 DIAGNOSIS — Z34.82 ENCOUNTER FOR SUPERVISION OF OTHER NORMAL PREGNANCY IN SECOND TRIMESTER: ICD-10-CM

## 2025-07-21 PROCEDURE — 76816 OB US FOLLOW-UP PER FETUS: CPT

## 2025-09-03 ENCOUNTER — HOSPITAL ENCOUNTER (INPATIENT)
Facility: HOSPITAL | Age: 25
End: 2025-09-03
Attending: FAMILY MEDICINE | Admitting: FAMILY MEDICINE
Payer: COMMERCIAL

## 2025-09-03 ENCOUNTER — HOSPITAL ENCOUNTER (EMERGENCY)
Facility: HOSPITAL | Age: 25
End: 2025-09-03
Payer: COMMERCIAL

## 2025-09-03 PROBLEM — O47.00 THREATENED PRETERM LABOR, ANTEPARTUM: Status: ACTIVE | Noted: 2025-09-03

## 2025-09-03 PROBLEM — Z36.89 ENCOUNTER FOR TRIAGE IN PREGNANT PATIENT: Status: ACTIVE | Noted: 2025-09-03

## 2025-09-03 LAB
ABO + RH BLD: NORMAL
ALBUMIN UR-MCNC: NEGATIVE MG/DL
APPEARANCE UR: CLEAR
BACTERIA #/AREA URNS HPF: ABNORMAL /HPF
BASOPHILS # BLD AUTO: 0.03 10E3/UL (ref 0–0.2)
BASOPHILS NFR BLD AUTO: 0.3 %
BILIRUB UR QL STRIP: NEGATIVE
BLD GP AB SCN SERPL QL: NEGATIVE
CLUE CELLS: ABNORMAL
COLOR UR AUTO: ABNORMAL
EOSINOPHIL # BLD AUTO: 0.08 10E3/UL (ref 0–0.7)
EOSINOPHIL NFR BLD AUTO: 0.9 %
ERYTHROCYTE [DISTWIDTH] IN BLOOD BY AUTOMATED COUNT: 14 % (ref 10–15)
FFN SPECIMEN INTEGRITY: ABNORMAL
FIBRONECTIN FETAL VAG QL: POSITIVE
GLUCOSE UR STRIP-MCNC: NEGATIVE MG/DL
HCT VFR BLD AUTO: 33.6 % (ref 35–47)
HGB BLD-MCNC: 11.4 G/DL (ref 11.7–15.7)
HGB UR QL STRIP: ABNORMAL
IMM GRANULOCYTES # BLD: 0.17 10E3/UL
IMM GRANULOCYTES NFR BLD: 1.9 %
KETONES UR STRIP-MCNC: NEGATIVE MG/DL
LEUKOCYTE ESTERASE UR QL STRIP: ABNORMAL
LYMPHOCYTES # BLD AUTO: 0.96 10E3/UL (ref 0.8–5.3)
LYMPHOCYTES NFR BLD AUTO: 10.8 %
MCH RBC QN AUTO: 28.5 PG (ref 26.5–33)
MCHC RBC AUTO-ENTMCNC: 33.9 G/DL (ref 31.5–36.5)
MCV RBC AUTO: 84 FL (ref 78–100)
MONOCYTES # BLD AUTO: 0.55 10E3/UL (ref 0–1.3)
MONOCYTES NFR BLD AUTO: 6.2 %
MUCOUS THREADS #/AREA URNS LPF: PRESENT /LPF
NEUTROPHILS # BLD AUTO: 7.07 10E3/UL (ref 1.6–8.3)
NEUTROPHILS NFR BLD AUTO: 79.9 %
NITRATE UR QL: NEGATIVE
NRBC # BLD AUTO: <0.03 10E3/UL
NRBC BLD AUTO-RTO: 0 /100
PH UR STRIP: 7 [PH] (ref 5–7)
PLATELET # BLD AUTO: 173 10E3/UL (ref 150–450)
RBC # BLD AUTO: 4 10E6/UL (ref 3.8–5.2)
RBC URINE: 1 /HPF
SP GR UR STRIP: 1.01 (ref 1–1.03)
SPECIMEN EXP DATE BLD: NORMAL
SQUAMOUS EPITHELIAL: 3 /HPF
TRICHOMONAS, WET PREP: ABNORMAL
UROBILINOGEN UR STRIP-MCNC: NORMAL MG/DL
WBC # BLD AUTO: 8.86 10E3/UL (ref 4–11)
WBC URINE: 2 /HPF
WBC'S/HIGH POWER FIELD, WET PREP: ABNORMAL
YEAST, WET PREP: ABNORMAL

## 2025-09-03 PROCEDURE — 36415 COLL VENOUS BLD VENIPUNCTURE: CPT | Performed by: FAMILY MEDICINE

## 2025-09-03 PROCEDURE — 81001 URINALYSIS AUTO W/SCOPE: CPT | Performed by: FAMILY MEDICINE

## 2025-09-03 PROCEDURE — 87591 N.GONORRHOEAE DNA AMP PROB: CPT | Performed by: FAMILY MEDICINE

## 2025-09-03 PROCEDURE — 85025 COMPLETE CBC W/AUTO DIFF WBC: CPT | Performed by: FAMILY MEDICINE

## 2025-09-03 PROCEDURE — 87210 SMEAR WET MOUNT SALINE/INK: CPT | Performed by: FAMILY MEDICINE

## 2025-09-03 PROCEDURE — 120N000001 HC R&B MED SURG/OB

## 2025-09-03 PROCEDURE — 258N000003 HC RX IP 258 OP 636: Performed by: FAMILY MEDICINE

## 2025-09-03 PROCEDURE — 86780 TREPONEMA PALLIDUM: CPT | Performed by: FAMILY MEDICINE

## 2025-09-03 PROCEDURE — 82731 ASSAY OF FETAL FIBRONECTIN: CPT | Performed by: FAMILY MEDICINE

## 2025-09-03 PROCEDURE — 86901 BLOOD TYPING SEROLOGIC RH(D): CPT | Performed by: FAMILY MEDICINE

## 2025-09-03 PROCEDURE — 250N000011 HC RX IP 250 OP 636: Performed by: FAMILY MEDICINE

## 2025-09-03 RX ORDER — SODIUM CHLORIDE, SODIUM LACTATE, POTASSIUM CHLORIDE, CALCIUM CHLORIDE 600; 310; 30; 20 MG/100ML; MG/100ML; MG/100ML; MG/100ML
INJECTION, SOLUTION INTRAVENOUS CONTINUOUS
Status: DISCONTINUED | OUTPATIENT
Start: 2025-09-04 | End: 2025-09-04

## 2025-09-03 RX ORDER — LIDOCAINE 40 MG/G
CREAM TOPICAL
Status: DISCONTINUED | OUTPATIENT
Start: 2025-09-03 | End: 2025-09-03 | Stop reason: HOSPADM

## 2025-09-03 RX ORDER — DOCUSATE SODIUM 100 MG/1
100 CAPSULE, LIQUID FILLED ORAL 2 TIMES DAILY
Status: ACTIVE | OUTPATIENT
Start: 2025-09-04

## 2025-09-03 RX ORDER — BETAMETHASONE SODIUM PHOSPHATE AND BETAMETHASONE ACETATE 3; 3 MG/ML; MG/ML
12 INJECTION, SUSPENSION INTRA-ARTICULAR; INTRALESIONAL; INTRAMUSCULAR; SOFT TISSUE EVERY 24 HOURS
Status: COMPLETED | OUTPATIENT
Start: 2025-09-04 | End: 2025-09-05

## 2025-09-03 RX ORDER — CALCIUM GLUCONATE 98 MG/ML
1 INJECTION, SOLUTION INTRAVENOUS
Status: ACTIVE | OUTPATIENT
Start: 2025-09-03

## 2025-09-03 RX ORDER — MAGNESIUM SULFATE IN WATER 40 MG/ML
2 INJECTION, SOLUTION INTRAVENOUS CONTINUOUS
Status: DISCONTINUED | OUTPATIENT
Start: 2025-09-04 | End: 2025-09-04

## 2025-09-03 RX ADMIN — SODIUM CHLORIDE, SODIUM LACTATE, POTASSIUM CHLORIDE, AND CALCIUM CHLORIDE 50 ML/HR: .6; .31; .03; .02 INJECTION, SOLUTION INTRAVENOUS at 23:53

## 2025-09-03 RX ADMIN — BETAMETHASONE SODIUM PHOSPHATE AND BETAMETHASONE ACETATE 12 MG: 3; 3 INJECTION, SUSPENSION INTRA-ARTICULAR; INTRALESIONAL; INTRAMUSCULAR at 23:55

## 2025-09-03 ASSESSMENT — ACTIVITIES OF DAILY LIVING (ADL)
ADLS_ACUITY_SCORE: 22

## 2025-09-04 VITALS
HEIGHT: 61 IN | SYSTOLIC BLOOD PRESSURE: 110 MMHG | WEIGHT: 144.6 LBS | HEART RATE: 102 BPM | RESPIRATION RATE: 16 BRPM | TEMPERATURE: 98.3 F | OXYGEN SATURATION: 100 % | DIASTOLIC BLOOD PRESSURE: 75 MMHG | BODY MASS INDEX: 27.3 KG/M2

## 2025-09-04 LAB
ALBUMIN SERPL BCG-MCNC: 3.8 G/DL (ref 3.5–5.2)
ALP SERPL-CCNC: 78 U/L (ref 40–150)
ALT SERPL W P-5'-P-CCNC: 18 U/L (ref 0–50)
ALT SERPL W P-5'-P-CCNC: 18 U/L (ref 0–50)
ANION GAP SERPL CALCULATED.3IONS-SCNC: 15 MMOL/L (ref 7–15)
AST SERPL W P-5'-P-CCNC: 32 U/L (ref 0–45)
AST SERPL W P-5'-P-CCNC: 32 U/L (ref 0–45)
ATRIAL RATE - MUSE: 99 BPM
BASOPHILS # BLD AUTO: <0.03 10E3/UL (ref 0–0.2)
BASOPHILS NFR BLD AUTO: 0.2 %
BILIRUB SERPL-MCNC: 0.3 MG/DL
BUN SERPL-MCNC: 3.1 MG/DL (ref 6–20)
C TRACH DNA SPEC QL PROBE+SIG AMP: NEGATIVE
CALCIUM SERPL-MCNC: 7.3 MG/DL (ref 8.8–10.4)
CHLORIDE SERPL-SCNC: 102 MMOL/L (ref 98–107)
CREAT SERPL-MCNC: 0.33 MG/DL (ref 0.51–0.95)
DIASTOLIC BLOOD PRESSURE - MUSE: NORMAL MMHG
EGFRCR SERPLBLD CKD-EPI 2021: >90 ML/MIN/1.73M2
EOSINOPHIL # BLD AUTO: <0.03 10E3/UL (ref 0–0.7)
EOSINOPHIL NFR BLD AUTO: 0 %
ERYTHROCYTE [DISTWIDTH] IN BLOOD BY AUTOMATED COUNT: 13.9 % (ref 10–15)
EST. AVERAGE GLUCOSE BLD GHB EST-MCNC: 88 MG/DL
GLUCOSE P FAST SERPL-MCNC: 134 MG/DL (ref 60–94)
GLUCOSE P FAST SERPL-MCNC: 156 MG/DL (ref 60–94)
GLUCOSE SERPL-MCNC: 125 MG/DL (ref 70–99)
HBA1C MFR BLD: 4.7 %
HCO3 SERPL-SCNC: 18 MMOL/L (ref 22–29)
HCT VFR BLD AUTO: 34.5 % (ref 35–47)
HGB BLD-MCNC: 11.3 G/DL (ref 11.7–15.7)
HOLD SPECIMEN: NORMAL
IMM GRANULOCYTES # BLD: 0.27 10E3/UL
IMM GRANULOCYTES NFR BLD: 3.1 %
INTERPRETATION ECG - MUSE: NORMAL
LYMPHOCYTES # BLD AUTO: 0.9 10E3/UL (ref 0.8–5.3)
LYMPHOCYTES NFR BLD AUTO: 10.4 %
MAGNESIUM SERPL-MCNC: 4.6 MG/DL (ref 1.7–2.3)
MAGNESIUM SERPL-MCNC: 4.7 MG/DL (ref 1.7–2.3)
MCH RBC QN AUTO: 27.9 PG (ref 26.5–33)
MCHC RBC AUTO-ENTMCNC: 32.8 G/DL (ref 31.5–36.5)
MCV RBC AUTO: 85.2 FL (ref 78–100)
MONOCYTES # BLD AUTO: 0.8 10E3/UL (ref 0–1.3)
MONOCYTES NFR BLD AUTO: 9.2 %
N GONORRHOEA DNA SPEC QL NAA+PROBE: NEGATIVE
NEUTROPHILS # BLD AUTO: 6.68 10E3/UL (ref 1.6–8.3)
NEUTROPHILS NFR BLD AUTO: 77.1 %
NRBC # BLD AUTO: <0.03 10E3/UL
NRBC BLD AUTO-RTO: 0 /100
P AXIS - MUSE: 42 DEGREES
PLATELET # BLD AUTO: 194 10E3/UL (ref 150–450)
POTASSIUM SERPL-SCNC: 3.2 MMOL/L (ref 3.4–5.3)
PR INTERVAL - MUSE: 132 MS
PROT SERPL-MCNC: 7.1 G/DL (ref 6.4–8.3)
QRS DURATION - MUSE: 78 MS
QT - MUSE: 418 MS
QTC - MUSE: 536 MS
R AXIS - MUSE: 12 DEGREES
RBC # BLD AUTO: 4.05 10E6/UL (ref 3.8–5.2)
SODIUM SERPL-SCNC: 135 MMOL/L (ref 135–145)
SPECIMEN TYPE: NORMAL
SYSTOLIC BLOOD PRESSURE - MUSE: NORMAL MMHG
T AXIS - MUSE: 10 DEGREES
T PALLIDUM AB SER QL: NONREACTIVE
TROPONIN T SERPL HS-MCNC: <6 NG/L
VENTRICULAR RATE- MUSE: 99 BPM
WBC # BLD AUTO: 8.67 10E3/UL (ref 4–11)

## 2025-09-04 PROCEDURE — 82947 ASSAY GLUCOSE BLOOD QUANT: CPT | Performed by: FAMILY MEDICINE

## 2025-09-04 PROCEDURE — 84450 TRANSFERASE (AST) (SGOT): CPT | Performed by: FAMILY MEDICINE

## 2025-09-04 PROCEDURE — 250N000013 HC RX MED GY IP 250 OP 250 PS 637: Performed by: FAMILY MEDICINE

## 2025-09-04 PROCEDURE — 258N000003 HC RX IP 258 OP 636: Performed by: FAMILY MEDICINE

## 2025-09-04 PROCEDURE — 36415 COLL VENOUS BLD VENIPUNCTURE: CPT | Performed by: FAMILY MEDICINE

## 2025-09-04 PROCEDURE — 85018 HEMOGLOBIN: CPT | Performed by: FAMILY MEDICINE

## 2025-09-04 PROCEDURE — 83036 HEMOGLOBIN GLYCOSYLATED A1C: CPT | Performed by: FAMILY MEDICINE

## 2025-09-04 PROCEDURE — 120N000001 HC R&B MED SURG/OB

## 2025-09-04 PROCEDURE — 84460 ALANINE AMINO (ALT) (SGPT): CPT | Performed by: FAMILY MEDICINE

## 2025-09-04 PROCEDURE — 83735 ASSAY OF MAGNESIUM: CPT | Performed by: FAMILY MEDICINE

## 2025-09-04 PROCEDURE — 84484 ASSAY OF TROPONIN QUANT: CPT | Performed by: FAMILY MEDICINE

## 2025-09-04 RX ORDER — CALCIUM CARBONATE 500 MG/1
1000 TABLET, CHEWABLE ORAL ONCE
Status: COMPLETED | OUTPATIENT
Start: 2025-09-04 | End: 2025-09-04

## 2025-09-04 RX ORDER — MAGNESIUM SULFATE IN WATER 40 MG/ML
2 INJECTION, SOLUTION INTRAVENOUS CONTINUOUS
Status: DISCONTINUED | OUTPATIENT
Start: 2025-09-04 | End: 2025-09-04

## 2025-09-04 RX ADMIN — SODIUM CHLORIDE, SODIUM LACTATE, POTASSIUM CHLORIDE, AND CALCIUM CHLORIDE: .6; .31; .03; .02 INJECTION, SOLUTION INTRAVENOUS at 18:52

## 2025-09-04 RX ADMIN — CALCIUM CARBONATE (ANTACID) CHEW TAB 500 MG 1000 MG: 500 CHEW TAB at 20:04

## 2025-09-04 ASSESSMENT — ACTIVITIES OF DAILY LIVING (ADL)
ADLS_ACUITY_SCORE: 22
ADLS_ACUITY_SCORE: 23
ADLS_ACUITY_SCORE: 22
ADLS_ACUITY_SCORE: 23
ADLS_ACUITY_SCORE: 22
ADLS_ACUITY_SCORE: 23

## 2025-09-05 PROCEDURE — 250N000011 HC RX IP 250 OP 636: Performed by: FAMILY MEDICINE

## 2025-09-05 RX ADMIN — BETAMETHASONE SODIUM PHOSPHATE AND BETAMETHASONE ACETATE 12 MG: 3; 3 INJECTION, SUSPENSION INTRA-ARTICULAR; INTRALESIONAL; INTRAMUSCULAR at 00:03
